# Patient Record
Sex: FEMALE | Race: WHITE | Employment: UNEMPLOYED | URBAN - METROPOLITAN AREA
[De-identification: names, ages, dates, MRNs, and addresses within clinical notes are randomized per-mention and may not be internally consistent; named-entity substitution may affect disease eponyms.]

---

## 2017-02-27 PROBLEM — M67.431 GANGLION CYST OF VOLAR ASPECT OF RIGHT WRIST: Status: ACTIVE | Noted: 2017-02-27

## 2017-05-02 PROBLEM — R19.7 DIARRHEA, UNSPECIFIED TYPE: Status: ACTIVE | Noted: 2017-05-02

## 2017-05-02 PROBLEM — K91.5 POST-CHOLECYSTECTOMY SYNDROME: Status: ACTIVE | Noted: 2017-05-02

## 2017-06-27 ENCOUNTER — TELEPHONE (OUTPATIENT)
Dept: FAMILY MEDICINE CLINIC | Facility: CLINIC | Age: 32
End: 2017-06-27

## 2017-06-27 NOTE — TELEPHONE ENCOUNTER
Pt states she feels a mass about the size of a fist in her abdomen, right above belly button. Has had three hernia's in same area in the past.  Pt sent to triage.

## 2017-07-07 ENCOUNTER — OFFICE VISIT (OUTPATIENT)
Dept: FAMILY MEDICINE CLINIC | Facility: CLINIC | Age: 32
End: 2017-07-07

## 2017-07-07 VITALS
RESPIRATION RATE: 16 BRPM | HEART RATE: 88 BPM | DIASTOLIC BLOOD PRESSURE: 82 MMHG | SYSTOLIC BLOOD PRESSURE: 120 MMHG | TEMPERATURE: 99 F | BODY MASS INDEX: 37 KG/M2 | WEIGHT: 198 LBS

## 2017-07-07 DIAGNOSIS — K43.9 VENTRAL HERNIA WITHOUT OBSTRUCTION OR GANGRENE: Primary | ICD-10-CM

## 2017-07-07 PROCEDURE — 99213 OFFICE O/P EST LOW 20 MIN: CPT | Performed by: FAMILY MEDICINE

## 2017-07-07 NOTE — PROGRESS NOTES
Claudine Diaz is a 27year old female. HPI:   Patient has a history of previous ventral abdominal hernia. She has noted a swelling in her mid abdomen similar to past hernias. It is nonpainful.   Her bowel movements are normal.  Her periods are yung well developed, well nourished,in no apparent distress  SKIN: no rashes,no suspicious lesions  GI: There is a mid upper abdominal mass measuring greater than 10 cm consistent with a ventral abdominal hernia  ASSESSMENT AND PLAN:   Ventral hernia without ob

## 2017-07-10 ENCOUNTER — TELEPHONE (OUTPATIENT)
Dept: SURGERY | Facility: CLINIC | Age: 32
End: 2017-07-10

## 2017-07-10 ENCOUNTER — TELEPHONE (OUTPATIENT)
Dept: FAMILY MEDICINE CLINIC | Facility: CLINIC | Age: 32
End: 2017-07-10

## 2017-07-10 DIAGNOSIS — K43.9 VENTRAL HERNIA WITHOUT OBSTRUCTION OR GANGRENE: Primary | ICD-10-CM

## 2017-07-10 NOTE — TELEPHONE ENCOUNTER
Call to pt's cell reaches identified voice mail. Per hipaa consent left vmm advising dr Shari Cheadle placed requested CT abdomen order. Can call [facility] at [phone] to schedule.    Advised if any further questions to call back to triage n

## 2017-07-10 NOTE — TELEPHONE ENCOUNTER
Advised patient that she would have to see Dr. Ginger Ellis in the office before any imaging would be ordered.      Your appointments     Date & Time Appointment Department [facility])    Jul 24, 2017  4:00 PM CDT Exam - Established Patient with Yvette Wiseman,

## 2017-07-10 NOTE — TELEPHONE ENCOUNTER
Pt stated she saw Dr. Thelma Carrillo last Friday (7/7) and he diagnosed pt with a hernia. Pt was referred back to her previous surgeon, Dr. Sarai Raman with whom she has an appt 7/24.  Pt is asking if Dr. Thelma Carrillo could put in an order for a CT so this can

## 2017-07-13 ENCOUNTER — HOSPITAL ENCOUNTER (OUTPATIENT)
Dept: CT IMAGING | Facility: HOSPITAL | Age: 32
Discharge: HOME | End: 2017-07-13
Attending: FAMILY MEDICINE
Payer: COMMERCIAL

## 2017-07-13 DIAGNOSIS — K43.9 VENTRAL HERNIA WITHOUT OBSTRUCTION OR GANGRENE: Primary | ICD-10-CM

## 2017-07-13 DIAGNOSIS — K43.9 VENTRAL HERNIA WITHOUT OBSTRUCTION OR GANGRENE: ICD-10-CM

## 2017-07-13 PROCEDURE — 74176 CT ABD & PELVIS W/O CONTRAST: CPT | Performed by: FAMILY MEDICINE

## 2017-07-13 NOTE — PROGRESS NOTES
Janie Moore from the radiologist office regarding the patient CT order of the abdomen.   Per Janie Moore she stated that she was discussing with the patient and per the patient  She stated that her pain is not only in her abdomen but it extends downward towards her pel

## 2017-07-14 ENCOUNTER — OFFICE VISIT (OUTPATIENT)
Dept: SURGERY | Facility: CLINIC | Age: 32
End: 2017-07-14

## 2017-07-14 VITALS
BODY MASS INDEX: 36.25 KG/M2 | WEIGHT: 192 LBS | DIASTOLIC BLOOD PRESSURE: 84 MMHG | HEART RATE: 89 BPM | TEMPERATURE: 99 F | SYSTOLIC BLOOD PRESSURE: 119 MMHG | HEIGHT: 61 IN

## 2017-07-14 DIAGNOSIS — K43.2 RECURRENT VENTRAL HERNIA: Primary | ICD-10-CM

## 2017-07-14 DIAGNOSIS — Z98.890 S/P REPAIR OF VENTRAL HERNIA: ICD-10-CM

## 2017-07-14 DIAGNOSIS — Z87.19 S/P REPAIR OF VENTRAL HERNIA: ICD-10-CM

## 2017-07-14 PROCEDURE — 99243 OFF/OP CNSLTJ NEW/EST LOW 30: CPT | Performed by: SURGERY

## 2017-07-14 NOTE — H&P
New Patient  Stephani Mcmanus  6/27/1985 7/14/2017    This patient was referred by Jaylene Barreto MD for evaluation and consultation for ventral hernia. Chief Complaint: ventral hernia    History of Present Illness:  The patient is a 26-year-old HERNIA REPAIR N/A      Comment: Procedure:  HERNIA VENTRAL REPAIR;  Surgeon:                Pernell Jose MD;  Location: [address] [place] [address] MAIN OR      Social History  Social History   Marital status:   Spouse name: N/A    Years of education: N/A  Number of ch Grandmother    • Bipolar Disorder Brother    • Diabetes Father      type 2   • Diabetes Maternal Grandfather      type 2       Objective/Physical Exam:  General: Alert, orientated x3. Cooperative. No apparent distress.   Vital Signs:  Blood pressure 119/8 repair of a ventral hernia with mesh performed by Dr. Yuri Lucia in March 2016.   Obdulio Godoy has an appointment with Dr. Diallo Born later this month however she is here today for evaluation as she is concerned regarding potential incarceration of her most recent he

## 2017-07-14 NOTE — H&P
New Patient Visit Note       Active Problems      No diagnosis found. Chief Complaint   Patient presents with:  Hernia: Hx of ventral hernia repair 2015. Patient had CT abd/pelvis done at [facility] on 7/13/17. c/o abdominal pain 3/10.  Having bowel movement education:                 Number of children:               Social History Main Topics    Smoking status: Never Smoker                                                                Smokeless tobacco: Never Used                        Alcohol use:  No Negative for color change and rash. Neurological: Negative for tremors, syncope and weakness. Hematological: Negative for adenopathy. Does not bruise/bleed easily. Psychiatric/Behavioral: Negative for behavioral problems and sleep disturbance.

## 2017-07-17 ENCOUNTER — OFFICE VISIT (OUTPATIENT)
Dept: SURGERY | Facility: CLINIC | Age: 32
End: 2017-07-17

## 2017-07-17 VITALS
HEART RATE: 91 BPM | HEIGHT: 61 IN | DIASTOLIC BLOOD PRESSURE: 90 MMHG | BODY MASS INDEX: 36.25 KG/M2 | SYSTOLIC BLOOD PRESSURE: 136 MMHG | WEIGHT: 192 LBS | TEMPERATURE: 98 F

## 2017-07-17 DIAGNOSIS — K43.9 VENTRAL HERNIA WITHOUT OBSTRUCTION OR GANGRENE: Primary | ICD-10-CM

## 2017-07-17 PROCEDURE — 99244 OFF/OP CNSLTJ NEW/EST MOD 40: CPT | Performed by: SURGERY

## 2017-07-17 NOTE — PROGRESS NOTES
Follow Up Visit Note       Active Problems      1. Ventral hernia without obstruction or gangrene          Chief Complaint   Patient presents with:  Hernia: Est pt in for reccurent ventral hernia. C/O discomfort/soreness. Last repair done 3/7/2016. and vomiting)    • Post-cholecystectomy syndrome      Past Surgical History:  No date:   \: CHOLECYSTECTOMY  10/15/2015: COLONOSCOPY N/A      Comment: Procedure: COLONOSCOPY;  Surgeon: Antonia Duque MD;  Location: [address] [place] [address] 1,000 Units by mouth daily. Disp:  Rfl:    NON FORMULARY Troy-inositolFor PCOS Disp:  Rfl:    MetFORMIN HCl  MG Oral Tablet 24 Hr Take 750 mg by mouth 2 (two) times daily with meals.  Disp:  Rfl:    Fluocinonide 0.05 % External Solution Apply to scalp No murmur heard. Pulmonary/Chest: No accessory muscle usage. No respiratory distress. She has no decreased breath sounds. She has no wheezes. She has no rhonchi. She has no rales. Abdominal: Soft. Normal appearance.  She exhibits no shifting dullness, waiting to repair the hernia until after she has a third baby. She does then run the risk of a recurrent incarceration. The patient does not wish to wait for repair. · We discussed proceeding with repair at this time.   I would then ask that she wait 6 m

## 2017-07-28 ENCOUNTER — ANESTHESIA EVENT (OUTPATIENT)
Dept: SURGERY | Facility: HOSPITAL | Age: 32
End: 2017-07-28

## 2017-07-28 ENCOUNTER — SURGERY (OUTPATIENT)
Age: 32
End: 2017-07-28

## 2017-07-28 ENCOUNTER — HOSPITAL ENCOUNTER (OUTPATIENT)
Facility: HOSPITAL | Age: 32
Setting detail: HOSPITAL OUTPATIENT SURGERY
Discharge: HOME | End: 2017-07-28
Attending: SURGERY | Admitting: SURGERY
Payer: COMMERCIAL

## 2017-07-28 ENCOUNTER — ANESTHESIA (OUTPATIENT)
Dept: SURGERY | Facility: HOSPITAL | Age: 32
End: 2017-07-28

## 2017-07-28 VITALS
RESPIRATION RATE: 16 BRPM | OXYGEN SATURATION: 100 % | SYSTOLIC BLOOD PRESSURE: 128 MMHG | WEIGHT: 192 LBS | HEIGHT: 61 IN | HEART RATE: 77 BPM | DIASTOLIC BLOOD PRESSURE: 87 MMHG | BODY MASS INDEX: 36.25 KG/M2 | TEMPERATURE: 98 F

## 2017-07-28 DIAGNOSIS — K43.9 VENTRAL HERNIA WITHOUT OBSTRUCTION OR GANGRENE: ICD-10-CM

## 2017-07-28 LAB
POCT LOT NUMBER: NORMAL
POCT URINE PREGNANCY: NEGATIVE

## 2017-07-28 PROCEDURE — 0WUF0JZ SUPPLEMENT ABDOMINAL WALL WITH SYNTHETIC SUBSTITUTE, OPEN APPROACH: ICD-10-PCS | Performed by: SURGERY

## 2017-07-28 PROCEDURE — 81025 URINE PREGNANCY TEST: CPT | Performed by: SURGERY

## 2017-07-28 DEVICE — VENTRALEX ST HERNIA PATCH
Type: IMPLANTABLE DEVICE | Site: ABDOMEN | Status: FUNCTIONAL
Brand: VENTRALEX ST HERNIA PATCH

## 2017-07-28 RX ORDER — SODIUM CHLORIDE, SODIUM LACTATE, POTASSIUM CHLORIDE, CALCIUM CHLORIDE 600; 310; 30; 20 MG/100ML; MG/100ML; MG/100ML; MG/100ML
INJECTION, SOLUTION INTRAVENOUS CONTINUOUS
Status: DISCONTINUED | OUTPATIENT
Start: 2017-07-28 | End: 2017-07-28

## 2017-07-28 RX ORDER — NALOXONE HYDROCHLORIDE 0.4 MG/ML
80 INJECTION, SOLUTION INTRAMUSCULAR; INTRAVENOUS; SUBCUTANEOUS AS NEEDED
Status: DISCONTINUED | OUTPATIENT
Start: 2017-07-28 | End: 2017-07-28

## 2017-07-28 RX ORDER — HYDROMORPHONE HYDROCHLORIDE 1 MG/ML
0.4 INJECTION, SOLUTION INTRAMUSCULAR; INTRAVENOUS; SUBCUTANEOUS EVERY 5 MIN PRN
Status: DISCONTINUED | OUTPATIENT
Start: 2017-07-28 | End: 2017-07-28

## 2017-07-28 RX ORDER — BACITRACIN 50000 [USP'U]/1
INJECTION, POWDER, LYOPHILIZED, FOR SOLUTION INTRAMUSCULAR AS NEEDED
Status: DISCONTINUED | OUTPATIENT
Start: 2017-07-28 | End: 2017-07-28 | Stop reason: HOSPADM

## 2017-07-28 RX ORDER — HEPARIN SODIUM 5000 [USP'U]/ML
5000 INJECTION, SOLUTION INTRAVENOUS; SUBCUTANEOUS ONCE
Status: COMPLETED | OUTPATIENT
Start: 2017-07-28 | End: 2017-07-28

## 2017-07-28 RX ORDER — IBUPROFEN 600 MG/1
600 TABLET ORAL ONCE AS NEEDED
Status: DISCONTINUED | OUTPATIENT
Start: 2017-07-28 | End: 2017-07-28

## 2017-07-28 RX ORDER — SCOLOPAMINE TRANSDERMAL SYSTEM 1 MG/1
1 PATCH, EXTENDED RELEASE TRANSDERMAL ONCE
Status: DISCONTINUED | OUTPATIENT
Start: 2017-07-28 | End: 2017-07-28

## 2017-07-28 RX ORDER — BUPIVACAINE HYDROCHLORIDE AND EPINEPHRINE 2.5; 5 MG/ML; UG/ML
INJECTION, SOLUTION EPIDURAL; INFILTRATION; INTRACAUDAL; PERINEURAL AS NEEDED
Status: DISCONTINUED | OUTPATIENT
Start: 2017-07-28 | End: 2017-07-28 | Stop reason: HOSPADM

## 2017-07-28 RX ORDER — HYDROCODONE BITARTRATE AND ACETAMINOPHEN 5; 325 MG/1; MG/1
TABLET ORAL
Qty: 30 TABLET | Refills: 0 | Status: SHIPPED | OUTPATIENT
Start: 2017-07-28 | End: 2017-09-19 | Stop reason: ALTCHOICE

## 2017-07-28 RX ORDER — SCOLOPAMINE TRANSDERMAL SYSTEM 1 MG/1
PATCH, EXTENDED RELEASE TRANSDERMAL
Status: DISCONTINUED
Start: 2017-07-28 | End: 2017-07-28

## 2017-07-28 RX ORDER — HYDROMORPHONE HYDROCHLORIDE 1 MG/ML
INJECTION, SOLUTION INTRAMUSCULAR; INTRAVENOUS; SUBCUTANEOUS
Status: COMPLETED
Start: 2017-07-28 | End: 2017-07-28

## 2017-07-28 RX ORDER — CEFAZOLIN SODIUM 1 G/3ML
INJECTION, POWDER, FOR SOLUTION INTRAMUSCULAR; INTRAVENOUS
Status: DISCONTINUED | OUTPATIENT
Start: 2017-07-28 | End: 2017-07-28 | Stop reason: HOSPADM

## 2017-07-28 NOTE — ANESTHESIA PREPROCEDURE EVALUATION
PRE-OP EVALUATION    Patient Name: Vicki Santiago    Pre-op Diagnosis: Ventral hernia without obstruction or gangrene [K43.9]    Procedure(s):  VENTRAL HERNIA REPAIR WITH MESH,COMPLEX    Surgeon(s) and Role:     Vallarie Koyanagi, MD - Primary    P Neuro/Psych                                    Past Surgical History:  No date:   \: CHOLECYSTECTOMY  10/15/2015: COLONOSCOPY N/A      Comment: Procedure: COLONOSCOPY;  Surgeon: Brando Ellison MD;  Location:

## 2017-07-28 NOTE — OPERATIVE REPORT
PREOPERATIVE DIAGNOSIS: Incarcerated recurrent ventral hernia.    POSTOPERATIVE DIAGNOSIS: Incarcerated recurrent ventral hernia.    PROCEDURE PERFORMED: Repair of incarcerated recurrent ventral hernia with mesh  MD ANGEL Calhoun Prolene.  Once the mesh was checked and no gaps were noted, the sutures were tied down.   The fascia was reapproximated with a figure of eight 0 Ethibond suture.  The subcutaneous tissues were irrigated with normal saline.  The incision was then closed in 2

## 2017-07-28 NOTE — H&P
History & Physical Examination    Patient Name: Bhavin Angel  MRN: [id number]  CSN: [id number]  YOB: 1985    Diagnosis: Recurrent ventral hernia    Present Illness:  The patient is a 57-year-old female seen at the request of her primary c daily with meals.  Takes for PCOS  Disp:  Rfl:  7/27/2017 at pm   Fluocinonide 0.05 % External Solution Apply to scalp qd for two weeks Disp: 60 mL Rfl: 2 Past Month at Unknown time   Ketoconazole 2 % External Shampoo Wash scalp qod Disp: 120 mL Rfl: 2 Past david's [OTHER] Brother    • ADHD Mother    • OCD Mother    • Bipolar Disorder Maternal Grandmother    • Stroke Maternal Grandmother    • Bipolar Disorder Brother    • Diabetes Father      type 2   • Diabetes Maternal Grandfather      type 2        S

## 2017-07-28 NOTE — ANESTHESIA POSTPROCEDURE EVALUATION
[address] Patient Status:  Hospital Outpatient Surgery   Age/Gender 27year old female MRN [id number]   Location [address] Attending Yvette Wiseman MD   Hosp Day # 0 PCP Erica Henry MD

## 2017-08-10 ENCOUNTER — OFFICE VISIT (OUTPATIENT)
Dept: SURGERY | Facility: CLINIC | Age: 32
End: 2017-08-10

## 2017-08-10 VITALS
TEMPERATURE: 99 F | WEIGHT: 189 LBS | DIASTOLIC BLOOD PRESSURE: 84 MMHG | SYSTOLIC BLOOD PRESSURE: 116 MMHG | HEART RATE: 77 BPM | HEIGHT: 61 IN | BODY MASS INDEX: 35.68 KG/M2 | RESPIRATION RATE: 18 BRPM

## 2017-08-10 DIAGNOSIS — K43.2 RECURRENT VENTRAL HERNIA: Primary | ICD-10-CM

## 2017-08-10 PROCEDURE — 99024 POSTOP FOLLOW-UP VISIT: CPT | Performed by: PHYSICIAN ASSISTANT

## 2017-08-10 RX ORDER — TEMAZEPAM 15 MG/1
15 CAPSULE ORAL NIGHTLY PRN
Qty: 5 CAPSULE | Refills: 0 | Status: SHIPPED | OUTPATIENT
Start: 2017-08-10 | End: 2018-02-06

## 2017-08-10 NOTE — PROGRESS NOTES
Post Operative Visit Note       Active Problems  1. Recurrent ventral hernia         Chief Complaint   Patient presents with:  Post-Op: 1st PO- Repair of incarcerated recurrent ventral hernia with mesh done 7/28/17. c/o soreness.  states has elevated temp s date: TONSILLECTOMY  11/12/2015: VENTRAL HERNIA REPAIR N/A      Comment: Procedure: HERNIA VENTRAL REPAIR;  Surgeon:                Silverio Simons MD;  Location: [address] [place] [address]    The family history and social history have been reviewed by me today.     Enedelia Gilbert Fluocinonide 0.05 % External Solution Apply to scalp qd for two weeks Disp: 60 mL Rfl: 2   Ketoconazole 2 % External Shampoo Wash scalp qod Disp: 120 mL Rfl: 2   WAL-FRANCES 75 75 MG Oral Tab  Disp:  Rfl: 3   HYDROcodone-acetaminophen (NORCO) 5-325 MG Oral T distension and no mass. There is no tenderness. There is no rebound and no guarding. Abdominal exam: The abdomen is soft, nontender, nondistended, with good bowel sounds.     Incision: The incision is clean, dry, intact, without erythema or cellulitis

## 2017-09-15 ENCOUNTER — TELEPHONE (OUTPATIENT)
Dept: FAMILY MEDICINE CLINIC | Facility: CLINIC | Age: 32
End: 2017-09-15

## 2017-09-15 NOTE — TELEPHONE ENCOUNTER
Pt states she has been sick for about 10 days with what she termed a sinus infection. Denies a fever. Pt requesting to make an appt for next week in case she does not improve over the weekend.  Pt stated she has chest tightness and thinks it is just inflamm

## 2017-09-19 ENCOUNTER — OFFICE VISIT (OUTPATIENT)
Dept: FAMILY MEDICINE CLINIC | Facility: CLINIC | Age: 32
End: 2017-09-19

## 2017-09-19 VITALS
SYSTOLIC BLOOD PRESSURE: 106 MMHG | DIASTOLIC BLOOD PRESSURE: 74 MMHG | WEIGHT: 190 LBS | HEART RATE: 76 BPM | TEMPERATURE: 98 F | RESPIRATION RATE: 12 BRPM | HEIGHT: 61 IN | BODY MASS INDEX: 35.87 KG/M2

## 2017-09-19 DIAGNOSIS — R05.9 COUGH: Primary | ICD-10-CM

## 2017-09-19 DIAGNOSIS — J06.9 UPPER RESPIRATORY TRACT INFECTION, UNSPECIFIED TYPE: ICD-10-CM

## 2017-09-19 PROCEDURE — 99213 OFFICE O/P EST LOW 20 MIN: CPT | Performed by: FAMILY MEDICINE

## 2017-09-19 RX ORDER — FLUCONAZOLE 150 MG/1
150 TABLET ORAL ONCE
Qty: 2 TABLET | Refills: 0 | Status: SHIPPED | COMMUNITY
Start: 2017-09-19 | End: 2017-09-19

## 2017-09-19 RX ORDER — ESCITALOPRAM OXALATE 5 MG/1
2.5 TABLET ORAL DAILY
Refills: 0 | COMMUNITY
Start: 2017-08-29

## 2017-09-19 RX ORDER — DOXYCYCLINE 100 MG/1
TABLET ORAL
Qty: 20 TABLET | Refills: 0 | Status: SHIPPED | COMMUNITY
Start: 2017-09-19 | End: 2018-02-06

## 2017-09-19 NOTE — PROGRESS NOTES
CHIEF COMPLAINT:   Sick  HPI:   Jessica Lechuga is a 27year old female who presents for upper respiratory symptoms for  2  weeks. Patient reports congestion. Cough. PND. No fever. Sinus pressure. Hx of asthma. Sinus symptoms improving.   Now seems t N/A      Comment: Procedure: COLONOSCOPY;  Surgeon: Chidi Domingo MD;  Location: [address] [place] [address] ENDOSCOPY  No date: HERNIA SURGERY  WISDOM TEETH: OTHER  2005: OTHER SURGICAL HISTORY      Comment: gallbladder sx  No date: TONSILLECTOMY  11/12/2015: VE in this encounter.       Meds & Refills for this Visit:  Signed Prescriptions Disp Refills    Doxycycline Monohydrate 100 MG Oral Tab 20 tablet 0      Si po BID      fluconazole (DIFLUCAN) 150 MG Oral Tab 2 tablet 0      Sig: Take 1 tablet (150 mg total

## 2017-11-28 ENCOUNTER — APPOINTMENT (OUTPATIENT)
Dept: GENERAL RADIOLOGY | Facility: HOSPITAL | Age: 32
End: 2017-11-28
Attending: EMERGENCY MEDICINE
Payer: COMMERCIAL

## 2017-11-28 ENCOUNTER — HOSPITAL ENCOUNTER (EMERGENCY)
Facility: HOSPITAL | Age: 32
Discharge: HOME | End: 2017-11-28
Attending: EMERGENCY MEDICINE
Payer: COMMERCIAL

## 2017-11-28 VITALS
WEIGHT: 189 LBS | HEIGHT: 61 IN | RESPIRATION RATE: 14 BRPM | TEMPERATURE: 98 F | DIASTOLIC BLOOD PRESSURE: 86 MMHG | SYSTOLIC BLOOD PRESSURE: 138 MMHG | OXYGEN SATURATION: 100 % | HEART RATE: 74 BPM | BODY MASS INDEX: 35.68 KG/M2

## 2017-11-28 DIAGNOSIS — R05.8 POST-VIRAL COUGH SYNDROME: Primary | ICD-10-CM

## 2017-11-28 DIAGNOSIS — E87.6 HYPOKALEMIA: ICD-10-CM

## 2017-11-28 DIAGNOSIS — R19.7 DIARRHEA OF PRESUMED INFECTIOUS ORIGIN: ICD-10-CM

## 2017-11-28 DIAGNOSIS — R42 DIZZINESS: ICD-10-CM

## 2017-11-28 PROCEDURE — 80053 COMPREHEN METABOLIC PANEL: CPT | Performed by: EMERGENCY MEDICINE

## 2017-11-28 PROCEDURE — 83690 ASSAY OF LIPASE: CPT | Performed by: EMERGENCY MEDICINE

## 2017-11-28 PROCEDURE — 71020 XR CHEST PA + LAT CHEST (CPT=71020): CPT | Performed by: EMERGENCY MEDICINE

## 2017-11-28 PROCEDURE — 99284 EMERGENCY DEPT VISIT MOD MDM: CPT

## 2017-11-28 PROCEDURE — 85025 COMPLETE CBC W/AUTO DIFF WBC: CPT | Performed by: EMERGENCY MEDICINE

## 2017-11-28 PROCEDURE — 96361 HYDRATE IV INFUSION ADD-ON: CPT

## 2017-11-28 PROCEDURE — 96360 HYDRATION IV INFUSION INIT: CPT

## 2017-11-28 RX ORDER — POTASSIUM CHLORIDE 20 MEQ/1
40 TABLET, EXTENDED RELEASE ORAL ONCE
Status: COMPLETED | OUTPATIENT
Start: 2017-11-28 | End: 2017-11-28

## 2017-11-28 NOTE — ED INITIAL ASSESSMENT (HPI)
Pt here stating she is dehydrated and needs fluids. Pt stating she has a high heart rate, low blood pressure and shallow breathing. Recent vomiting with dry heaves and passing out on floor 4d ago. Temp 100.4 3d ago with chills, body aches .  Eating and dri

## 2017-11-28 NOTE — ED PROVIDER NOTES
Patient Seen in: [facility] Emergency Department    History   Patient presents with:  Dizziness (neurologic)    Stated Complaint: dizzy    HPI    Patient feeling dizzy and easily fatigued as well as easily short of breath since a gastrointestinal il Location:  MAIN OR        Smoking status: Never Smoker                                                              Smokeless tobacco: Never Used                      Alcohol use:  No                Review of Systems    Positive for stated complaint: dizz No cranial nerve deficit. Coordination normal.   Skin: Skin is warm and dry. No rash noted. She is not diaphoretic. No erythema. No pallor. Psychiatric: She has a normal mood and affect.  Her behavior is normal. Judgment normal.   Nursing note and vitals cardiac silhouette. MEDIASTINUM:  Normal.    PLEURA:  Normal.  No pleural effusions. BONES:  Normal for age.         =====    CONCLUSION:  No consolidation.                    Dictated by: Keyona Jimenez MD on 11/28/2017 at 9:53         Approved b origin  Hypokalemia  Dizziness    Disposition:  Discharge  11/28/2017 10:32 am    Follow-up:  Garret Calero MD  [address] [place] [address] [address]  [address] [place] [address] [phone]      As needed, If symptoms worsen        Medications Prescribed:  Current Disch

## 2017-11-30 ENCOUNTER — TELEPHONE (OUTPATIENT)
Dept: FAMILY MEDICINE CLINIC | Facility: CLINIC | Age: 32
End: 2017-11-30

## 2017-11-30 NOTE — TELEPHONE ENCOUNTER
Pt hoping to be sent Rx vs seen   Advised of providers response  Offered pt to be seen at [facility] for evaluation for this as well, no availability today  Pt verbalized understanding

## 2017-11-30 NOTE — TELEPHONE ENCOUNTER
Pt relayed that she (as well as her 2 kids and ) has lice. They have done a total of 4 treatments for the past 6 weeks of OTC RID lice treatment with no relief.  The pediatrician recommended she contact her PCP for a script for medicated lice shampoo

## 2017-11-30 NOTE — TELEPHONE ENCOUNTER
Would you like to see the pt? Or ok sending Rx? LOV 9/2017 for acute visit     Pls advise. Thank you.

## 2018-06-27 ENCOUNTER — OFFICE VISIT (OUTPATIENT)
Dept: FAMILY MEDICINE CLINIC | Facility: CLINIC | Age: 33
End: 2018-06-27

## 2018-06-27 VITALS
HEART RATE: 80 BPM | DIASTOLIC BLOOD PRESSURE: 84 MMHG | WEIGHT: 199 LBS | TEMPERATURE: 98 F | BODY MASS INDEX: 37.57 KG/M2 | SYSTOLIC BLOOD PRESSURE: 112 MMHG | RESPIRATION RATE: 16 BRPM | HEIGHT: 61 IN

## 2018-06-27 DIAGNOSIS — M62.830 MUSCLE SPASM OF BACK: Primary | ICD-10-CM

## 2018-06-27 PROCEDURE — 99213 OFFICE O/P EST LOW 20 MIN: CPT | Performed by: NURSE PRACTITIONER

## 2018-06-27 RX ORDER — DICLOFENAC SODIUM 75 MG/1
75 TABLET, DELAYED RELEASE ORAL 2 TIMES DAILY
Qty: 20 TABLET | Refills: 0 | Status: SHIPPED | OUTPATIENT
Start: 2018-06-27 | End: 2018-07-07

## 2018-06-27 RX ORDER — CYCLOBENZAPRINE HCL 10 MG
10 TABLET ORAL 3 TIMES DAILY PRN
Qty: 15 TABLET | Refills: 0 | Status: SHIPPED | OUTPATIENT
Start: 2018-06-27 | End: 2019-03-11

## 2018-06-27 NOTE — PROGRESS NOTES
Amy Gutierrez is a 26year old female. HPI:   Patient presents today reporting right upper back discomfort for the past 3 days. She reports that she was playing basketball on Saturday at the park with her children.  She reports that she has been alter Neurocardiogenic syncope     had as teen, outgrew   • Polycystic disease, ovaries    • PONV (postoperative nausea and vomiting)    • Post-cholecystectomy syndrome       Social History:  Smoking status: Never Smoker as ordered. Patient instructed to take medication with food. Cyclobenzaprine as needed-discussed with patient this medication can cause drowsiness- no driving or drinking alcohol while taking medication. Discussed with patient taking this at HS.   Can cont

## 2018-06-29 ENCOUNTER — TELEPHONE (OUTPATIENT)
Dept: FAMILY MEDICINE CLINIC | Facility: CLINIC | Age: 33
End: 2018-06-29

## 2018-06-29 NOTE — TELEPHONE ENCOUNTER
Pt calling with update. Everything prescribed is working. States she is showing improvement and doing better. Thank you.

## 2018-08-03 ENCOUNTER — OFFICE VISIT (OUTPATIENT)
Dept: FAMILY MEDICINE CLINIC | Facility: CLINIC | Age: 33
End: 2018-08-03
Payer: COMMERCIAL

## 2018-08-03 VITALS
DIASTOLIC BLOOD PRESSURE: 80 MMHG | BODY MASS INDEX: 37 KG/M2 | RESPIRATION RATE: 16 BRPM | WEIGHT: 196 LBS | SYSTOLIC BLOOD PRESSURE: 124 MMHG | TEMPERATURE: 99 F | HEART RATE: 84 BPM

## 2018-08-03 DIAGNOSIS — K91.5 POST-CHOLECYSTECTOMY SYNDROME: ICD-10-CM

## 2018-08-03 DIAGNOSIS — M25.50 DIFFUSE ARTHRALGIA: Primary | ICD-10-CM

## 2018-08-03 PROCEDURE — 99213 OFFICE O/P EST LOW 20 MIN: CPT | Performed by: FAMILY MEDICINE

## 2018-08-06 LAB
ABSOLUTE BASOPHILS: 38 CELLS/UL (ref 0–200)
ABSOLUTE EOSINOPHILS: 120 CELLS/UL (ref 15–500)
ABSOLUTE LYMPHOCYTES: 3143 CELLS/UL (ref 850–3900)
ABSOLUTE MONOCYTES: 458 CELLS/UL (ref 200–950)
ABSOLUTE NEUTROPHILS: 3743 CELLS/UL (ref 1500–7800)
ALBUMIN/GLOBULIN RATIO: 1.6 (CALC) (ref 1–2.5)
ALBUMIN: 4.2 G/DL (ref 3.6–5.1)
ALKALINE PHOSPHATASE: 66 U/L (ref 33–115)
ALT: 23 U/L (ref 6–29)
ANA SCREEN, IFA: NEGATIVE
AST: 22 U/L (ref 10–30)
BASOPHILS: 0.5 %
BILIRUBIN, TOTAL: 0.5 MG/DL (ref 0.2–1.2)
BUN: 11 MG/DL (ref 7–25)
C-REACTIVE PROTEIN: 6.3 MG/L
CALCIUM: 9.2 MG/DL (ref 8.6–10.2)
CARBON DIOXIDE: 21 MMOL/L (ref 20–31)
CHLORIDE: 105 MMOL/L (ref 98–110)
CHOL/HDLC RATIO: 3.1 (CALC)
CHOLESTEROL, TOTAL: 121 MG/DL
CREATININE: 0.89 MG/DL (ref 0.5–1.1)
CYCLIC CITRULLINATED$PEPTIDE (CCP) AB (IGG): <16 UNITS
EGFR IF AFRICN AM: 97 ML/MIN/1.73M2
EGFR IF NONAFRICN AM: 84 ML/MIN/1.73M2
EOSINOPHILS: 1.6 %
GLOBULIN: 2.7 G/DL (CALC) (ref 1.9–3.7)
GLUCOSE: 81 MG/DL (ref 65–99)
HDL CHOLESTEROL: 39 MG/DL
HEMATOCRIT: 42.7 % (ref 35–45)
HEMOGLOBIN: 13.6 G/DL (ref 11.7–15.5)
LDL-CHOLESTEROL: 60 MG/DL (CALC)
LYMPHOCYTES: 41.9 %
MCH: 26.3 PG (ref 27–33)
MCHC: 31.9 G/DL (ref 32–36)
MCV: 82.6 FL (ref 80–100)
MONOCYTES: 6.1 %
MPV: 11.4 FL (ref 7.5–12.5)
NEUTROPHILS: 49.9 %
NON-HDL CHOLESTEROL: 82 MG/DL (CALC)
PLATELET COUNT: 266 THOUSAND/UL (ref 140–400)
POTASSIUM: 4.3 MMOL/L (ref 3.5–5.3)
PROTEIN, TOTAL: 6.9 G/DL (ref 6.1–8.1)
RDW: 14 % (ref 11–15)
RED BLOOD CELL COUNT: 5.17 MILLION/UL (ref 3.8–5.1)
RHEUMATOID FACTOR: <14 IU/ML
SED RATE BY MODIFIED$WESTERGREN: 2 MM/H
SODIUM: 137 MMOL/L (ref 135–146)
TRIGLYCERIDES: 134 MG/DL
URIC ACID: 5.9 MG/DL (ref 2.5–7)
WHITE BLOOD CELL COUNT: 7.5 THOUSAND/UL (ref 3.8–10.8)

## 2018-08-13 NOTE — PROGRESS NOTES
Sharon Brian is a 26year old female. HPI:   Patient is a 64-year-old female who complains of migratory joint pains over 2-3 month timeframe. She states that the joint pains seem to concentrate mainly on the hands, feet and hips.   There is no assoc Polycystic disease, ovaries    • PONV (postoperative nausea and vomiting)    • Post-cholecystectomy syndrome       Social History:  Smoking status: Never Smoker                                                              Smokeless tobacco: Never Used

## 2018-11-19 ENCOUNTER — OFFICE VISIT (OUTPATIENT)
Dept: FAMILY MEDICINE CLINIC | Facility: CLINIC | Age: 33
End: 2018-11-19
Payer: COMMERCIAL

## 2018-11-19 VITALS
TEMPERATURE: 98 F | WEIGHT: 194 LBS | DIASTOLIC BLOOD PRESSURE: 74 MMHG | RESPIRATION RATE: 20 BRPM | HEIGHT: 61 IN | BODY MASS INDEX: 36.63 KG/M2 | HEART RATE: 95 BPM | OXYGEN SATURATION: 100 % | SYSTOLIC BLOOD PRESSURE: 110 MMHG

## 2018-11-19 DIAGNOSIS — J01.00 ACUTE NON-RECURRENT MAXILLARY SINUSITIS: Primary | ICD-10-CM

## 2018-11-19 PROCEDURE — 99213 OFFICE O/P EST LOW 20 MIN: CPT | Performed by: NURSE PRACTITIONER

## 2018-11-19 RX ORDER — AMOXICILLIN AND CLAVULANATE POTASSIUM 875; 125 MG/1; MG/1
1 TABLET, FILM COATED ORAL 2 TIMES DAILY
Qty: 20 TABLET | Refills: 0 | Status: SHIPPED | OUTPATIENT
Start: 2018-11-19 | End: 2018-11-29

## 2018-11-19 RX ORDER — FLUCONAZOLE 150 MG/1
TABLET ORAL
Qty: 2 TABLET | Refills: 0 | Status: SHIPPED | OUTPATIENT
Start: 2018-11-19 | End: 2019-02-08 | Stop reason: ALTCHOICE

## 2018-11-19 NOTE — PROGRESS NOTES
Allyson Ahuja is a 26year old female. HPI:   Patient presents today reporting a 2 week history of sinus congestion, post nasal drip, bilateral ear pressure and productive cough. Reports sinus headaches. Denies teeth pain.  She feels her symptoms are External Solution Apply to scalp qd for two weeks Disp: 60 mL Rfl: 2   WAL-FRANCES 75 75 MG Oral Tab  Disp:  Rfl: 3      Past Medical History:   Diagnosis Date   • Anesthesia complication     HARD TO AWAKEN   • Asthma     due bouts of flu   • Neurocardiogenic defined types were placed in this encounter.       Meds & Refills for this Visit:  Requested Prescriptions     Signed Prescriptions Disp Refills   • Amoxicillin-Pot Clavulanate 875-125 MG Oral Tab 20 tablet 0     Sig: Take 1 tablet by mouth 2 (two) times da

## 2018-12-11 DIAGNOSIS — K91.5 POST-CHOLECYSTECTOMY SYNDROME: ICD-10-CM

## 2018-12-11 DIAGNOSIS — R19.7 DIARRHEA, UNSPECIFIED TYPE: ICD-10-CM

## 2018-12-11 RX ORDER — CHOLESTYRAMINE LIGHT 4 G/5.7G
4 POWDER, FOR SUSPENSION ORAL 2 TIMES DAILY
Qty: 180 EACH | Refills: 0 | Status: SHIPPED | OUTPATIENT
Start: 2018-12-11 | End: 2019-01-23

## 2019-01-23 DIAGNOSIS — K91.5 POST-CHOLECYSTECTOMY SYNDROME: ICD-10-CM

## 2019-01-23 DIAGNOSIS — R19.7 DIARRHEA, UNSPECIFIED TYPE: ICD-10-CM

## 2019-01-24 RX ORDER — CHOLESTYRAMINE LIGHT 4 G/5.7G
POWDER, FOR SUSPENSION ORAL
Qty: 180 PACKET | Refills: 0 | Status: SHIPPED | OUTPATIENT
Start: 2019-01-24 | End: 2019-03-11

## 2019-02-08 ENCOUNTER — OFFICE VISIT (OUTPATIENT)
Dept: FAMILY MEDICINE CLINIC | Facility: CLINIC | Age: 34
End: 2019-02-08
Payer: COMMERCIAL

## 2019-02-08 VITALS
BODY MASS INDEX: 36.25 KG/M2 | SYSTOLIC BLOOD PRESSURE: 120 MMHG | WEIGHT: 192 LBS | DIASTOLIC BLOOD PRESSURE: 72 MMHG | HEIGHT: 61 IN | HEART RATE: 80 BPM | RESPIRATION RATE: 16 BRPM | TEMPERATURE: 98 F

## 2019-02-08 DIAGNOSIS — Z34.91 FIRST TRIMESTER PREGNANCY: Primary | ICD-10-CM

## 2019-02-08 DIAGNOSIS — Z01.30 EXAMINATION OF BLOOD PRESSURE: ICD-10-CM

## 2019-02-08 PROCEDURE — 99213 OFFICE O/P EST LOW 20 MIN: CPT | Performed by: FAMILY MEDICINE

## 2019-02-08 NOTE — PROGRESS NOTES
Simone Sy is a 26year old female. HPI:   Patient states that she is 6-7 weeks pregnant. She had appointment at her reproductive endocrinologist where her blood pressure was taken and found to be 140/90.   Patient states that the placement of the Post-cholecystectomy syndrome       Social History:  Social History    Tobacco Use      Smoking status: Never Smoker      Smokeless tobacco: Never Used    Alcohol use: No      Alcohol/week: 0.0 oz    Drug use: No       REVIEW OF SYSTEMS:   GENERAL HEALTH:

## 2019-03-11 ENCOUNTER — OFFICE VISIT (OUTPATIENT)
Dept: FAMILY MEDICINE CLINIC | Facility: CLINIC | Age: 34
End: 2019-03-11
Payer: COMMERCIAL

## 2019-03-11 VITALS
HEART RATE: 80 BPM | SYSTOLIC BLOOD PRESSURE: 122 MMHG | TEMPERATURE: 99 F | DIASTOLIC BLOOD PRESSURE: 80 MMHG | HEIGHT: 60 IN | WEIGHT: 192 LBS | BODY MASS INDEX: 37.69 KG/M2 | RESPIRATION RATE: 14 BRPM

## 2019-03-11 DIAGNOSIS — Z3A.14 14 WEEKS GESTATION OF PREGNANCY: ICD-10-CM

## 2019-03-11 DIAGNOSIS — Z00.00 ROUTINE GENERAL MEDICAL EXAMINATION AT A HEALTH CARE FACILITY: Primary | ICD-10-CM

## 2019-03-11 DIAGNOSIS — K91.5 POST-CHOLECYSTECTOMY SYNDROME: ICD-10-CM

## 2019-03-11 PROBLEM — R19.7 DIARRHEA, UNSPECIFIED TYPE: Status: RESOLVED | Noted: 2017-05-02 | Resolved: 2019-03-11

## 2019-03-11 PROCEDURE — 99395 PREV VISIT EST AGE 18-39: CPT | Performed by: FAMILY MEDICINE

## 2019-03-11 RX ORDER — CHOLESTYRAMINE LIGHT 4 G/5.7G
POWDER, FOR SUSPENSION ORAL
Qty: 180 PACKET | Refills: 1 | Status: SHIPPED | OUTPATIENT
Start: 2019-03-11 | End: 2020-01-22

## 2019-03-11 NOTE — PROGRESS NOTES
CHIEF COMPLAINT   Complete physical  HPI:   Mike Heredia is a 26year old female who presents for a complete physical exam.   Recent labs with gyne through [address] 14 weeks pregnant currently.     Wt Readings from Last 6 Encounters:  03/11/19 : 192 lb times daily with meals.  Takes for PCOS  Disp:  Rfl:    WAL-FRANCES 75 75 MG Oral Tab  Disp:  Rfl: 3        Propoxyphene            NAUSEA AND VOMITING  Biaxin [Clarithromy*    OTHER (SEE COMMENTS)    Comment:GI upset  Erythromycin            RASH    Comment:De REVIEW OF SYSTEMS:   GENERAL: feels well otherwise  SKIN: no complaint of any unusual skin lesions  EYES: no complaint of blurred vision or double vision  HEENT: no complaint of nasal congestion, sinus pain or ST  LUNGS: no complaint of shortness of br neck pain until after pregnancy.

## 2019-03-19 ENCOUNTER — TELEPHONE (OUTPATIENT)
Dept: FAMILY MEDICINE CLINIC | Facility: CLINIC | Age: 34
End: 2019-03-19

## 2019-03-19 NOTE — TELEPHONE ENCOUNTER
Pt came to the office and dropped off lab test results for S Dressler. Lab results were paper clipped and placed in S Dressler's mailbox.

## 2019-05-23 ENCOUNTER — OFFICE VISIT (OUTPATIENT)
Dept: FAMILY MEDICINE CLINIC | Facility: CLINIC | Age: 34
End: 2019-05-23
Payer: COMMERCIAL

## 2019-05-23 VITALS
DIASTOLIC BLOOD PRESSURE: 72 MMHG | TEMPERATURE: 98 F | WEIGHT: 198 LBS | BODY MASS INDEX: 38.87 KG/M2 | HEIGHT: 60 IN | HEART RATE: 84 BPM | SYSTOLIC BLOOD PRESSURE: 118 MMHG | RESPIRATION RATE: 16 BRPM

## 2019-05-23 DIAGNOSIS — H69.82 DYSFUNCTION OF LEFT EUSTACHIAN TUBE: Primary | ICD-10-CM

## 2019-05-23 PROCEDURE — 99213 OFFICE O/P EST LOW 20 MIN: CPT | Performed by: FAMILY MEDICINE

## 2019-05-23 NOTE — PROGRESS NOTES
CHIEF COMPLAINT:   Check left ear popping  HPI:   Had a cold 2 months ago. Also has allergy symptoms - getting allergy shots - no worse than normal.  Left ear feels popping with sneezing or coughing. Intermittent but usually happens every day.  When she is ESOPHAGOGASTRODUODENOSCOPY (EGD) N/A 10/15/2015    Performed by Wilda Orta MD at [facility] N/A 7/28/2017    Performed by Sebastián Desai MD at [facility] N/A 11/12/2015 encounter diagnosis)  Pregnancy   No orders of the defined types were placed in this encounter. Recommended trial of claritin and fluticasone if ok with OB - patient will verify ok with them prior to starting.   If still no better in 2 weeks, then see EN

## 2019-10-17 ENCOUNTER — TELEPHONE (OUTPATIENT)
Dept: FAMILY MEDICINE CLINIC | Facility: CLINIC | Age: 34
End: 2019-10-17

## 2019-10-17 ENCOUNTER — OFFICE VISIT (OUTPATIENT)
Dept: FAMILY MEDICINE CLINIC | Facility: CLINIC | Age: 34
End: 2019-10-17
Payer: COMMERCIAL

## 2019-10-17 VITALS
DIASTOLIC BLOOD PRESSURE: 90 MMHG | TEMPERATURE: 97 F | SYSTOLIC BLOOD PRESSURE: 130 MMHG | BODY MASS INDEX: 39 KG/M2 | HEART RATE: 76 BPM | RESPIRATION RATE: 16 BRPM | HEIGHT: 60 IN

## 2019-10-17 DIAGNOSIS — E66.9 OBESITY (BMI 30-39.9): ICD-10-CM

## 2019-10-17 DIAGNOSIS — E28.2 PCOS (POLYCYSTIC OVARIAN SYNDROME): Primary | ICD-10-CM

## 2019-10-17 PROCEDURE — 99214 OFFICE O/P EST MOD 30 MIN: CPT | Performed by: FAMILY MEDICINE

## 2019-10-17 NOTE — PROGRESS NOTES
Lu Reynoso is a 37year old female. CHIEF COMPLAINT   PCOS  HPI:   3month old - breast feeding. History of PCOS - diagnosed about . Saw reproductive endocrine who was managing but now referred back her. Taking metformin ER 750mg BID. Polycystic disease, ovaries    • PONV (postoperative nausea and vomiting)    • Post-cholecystectomy syndrome       Social History:  Social History    Tobacco Use      Smoking status: Never Smoker      Smokeless tobacco: Never Used    Alcohol use: No      A

## 2019-10-18 ENCOUNTER — LAB REQUISITION (OUTPATIENT)
Dept: LAB | Facility: HOSPITAL | Age: 34
End: 2019-10-18
Payer: COMMERCIAL

## 2019-10-18 DIAGNOSIS — Z00.00 ENCOUNTER FOR GENERAL ADULT MEDICAL EXAMINATION WITHOUT ABNORMAL FINDINGS: ICD-10-CM

## 2019-10-18 PROBLEM — M67.431 GANGLION CYST OF VOLAR ASPECT OF RIGHT WRIST: Status: RESOLVED | Noted: 2017-02-27 | Resolved: 2019-10-18

## 2019-10-18 PROCEDURE — 88305 TISSUE EXAM BY PATHOLOGIST: CPT | Performed by: DENTIST

## 2019-10-21 NOTE — TELEPHONE ENCOUNTER
Call to [facility] reaches voice mail. Left m req call back to me tomorrow after 730 am for a question re one of our pts. Provided our ofc  line #.

## 2019-10-22 NOTE — TELEPHONE ENCOUNTER
Call back from [facility] (dr line # [phone])-[id number] of info noted below from edwina JAMA. Provided pt's name,  and BC/BS ins. Ammon Amaya will send a One Touch meter and strips thru interoffice mail to stephanie dressler's attention.

## 2020-01-22 ENCOUNTER — HOSPITAL ENCOUNTER (OUTPATIENT)
Dept: GENERAL RADIOLOGY | Age: 35
Discharge: HOME | End: 2020-01-22
Attending: NURSE PRACTITIONER
Payer: COMMERCIAL

## 2020-01-22 ENCOUNTER — OFFICE VISIT (OUTPATIENT)
Dept: FAMILY MEDICINE CLINIC | Facility: CLINIC | Age: 35
End: 2020-01-22
Payer: COMMERCIAL

## 2020-01-22 VITALS
TEMPERATURE: 98 F | WEIGHT: 192 LBS | RESPIRATION RATE: 16 BRPM | OXYGEN SATURATION: 99 % | BODY MASS INDEX: 37.69 KG/M2 | SYSTOLIC BLOOD PRESSURE: 120 MMHG | HEART RATE: 76 BPM | HEIGHT: 60 IN | DIASTOLIC BLOOD PRESSURE: 82 MMHG

## 2020-01-22 DIAGNOSIS — S91.331D: ICD-10-CM

## 2020-01-22 DIAGNOSIS — M79.671 PAIN OF RIGHT HEEL: Primary | ICD-10-CM

## 2020-01-22 DIAGNOSIS — M79.671 PAIN OF RIGHT HEEL: ICD-10-CM

## 2020-01-22 PROCEDURE — 99213 OFFICE O/P EST LOW 20 MIN: CPT | Performed by: NURSE PRACTITIONER

## 2020-01-22 PROCEDURE — 73650 X-RAY EXAM OF HEEL: CPT | Performed by: NURSE PRACTITIONER

## 2020-01-22 RX ORDER — FAMOTIDINE 40 MG/1
40 TABLET, FILM COATED ORAL DAILY
COMMUNITY
End: 2021-05-19

## 2020-01-22 NOTE — PROGRESS NOTES
Jesica Peace is a 37year old female. HPI:   Patient presents today for an Immediate Care follow up. Patient stepped on a ceramic mug 3 weeks ago- mug broke and she swept up the pieces, what she stepped on was a piece that was not swept up.  Reports daily.     • Omega-3 Fatty Acids (OMEGA-3 FISH OIL OR) Take by mouth. • Cobalamine Combinations (B12 FOLATE OR) Take by mouth.      • escitalopram 5 MG Oral Tab 2.5 mg.    0   • VENTOLIN  (90 Base) MCG/ACT Inhalation Aero Soln 2 puffs every 6 pooja movement  CARDIO: RRR without murmur  EXTREMITIES: no cyanosis, clubbing or edema +2 posterior tibial pulses. Musculoskeletal: discomfort reported with palpation over wound of right heel. No range of motion deficits noted.   Neurological: nerves II through

## 2020-02-07 ENCOUNTER — APPOINTMENT (OUTPATIENT)
Dept: LAB | Age: 35
End: 2020-02-07
Attending: FAMILY MEDICINE
Payer: COMMERCIAL

## 2020-02-07 ENCOUNTER — OFFICE VISIT (OUTPATIENT)
Dept: FAMILY MEDICINE CLINIC | Facility: CLINIC | Age: 35
End: 2020-02-07
Payer: COMMERCIAL

## 2020-02-07 VITALS
DIASTOLIC BLOOD PRESSURE: 76 MMHG | HEIGHT: 60 IN | HEART RATE: 72 BPM | BODY MASS INDEX: 36.12 KG/M2 | WEIGHT: 184 LBS | RESPIRATION RATE: 12 BRPM | TEMPERATURE: 98 F | SYSTOLIC BLOOD PRESSURE: 116 MMHG

## 2020-02-07 DIAGNOSIS — R30.0 DYSURIA: ICD-10-CM

## 2020-02-07 DIAGNOSIS — E28.2 PCOS (POLYCYSTIC OVARIAN SYNDROME): ICD-10-CM

## 2020-02-07 DIAGNOSIS — R39.15 URINARY URGENCY: Primary | ICD-10-CM

## 2020-02-07 LAB
ALBUMIN SERPL-MCNC: 4 G/DL (ref 3.4–5)
ALBUMIN/GLOB SERPL: 1 {RATIO} (ref 1–2)
ALP LIVER SERPL-CCNC: 93 U/L (ref 37–98)
ALT SERPL-CCNC: 25 U/L (ref 13–56)
ANION GAP SERPL CALC-SCNC: 3 MMOL/L (ref 0–18)
APPEARANCE: CLEAR
AST SERPL-CCNC: 19 U/L (ref 15–37)
BILIRUB SERPL-MCNC: 0.5 MG/DL (ref 0.1–2)
BUN BLD-MCNC: 12 MG/DL (ref 7–18)
BUN/CREAT SERPL: 12.5 (ref 10–20)
CALCIUM BLD-MCNC: 9.5 MG/DL (ref 8.5–10.1)
CHLORIDE SERPL-SCNC: 106 MMOL/L (ref 98–112)
CO2 SERPL-SCNC: 29 MMOL/L (ref 21–32)
CREAT BLD-MCNC: 0.96 MG/DL (ref 0.55–1.02)
EST. AVERAGE GLUCOSE BLD GHB EST-MCNC: 105 MG/DL (ref 68–126)
GLOBULIN PLAS-MCNC: 4 G/DL (ref 2.8–4.4)
GLUCOSE BLD-MCNC: 92 MG/DL (ref 70–99)
HBA1C MFR BLD HPLC: 5.3 % (ref ?–5.7)
M PROTEIN MFR SERPL ELPH: 8 G/DL (ref 6.4–8.2)
MULTISTIX LOT#: NORMAL NUMERIC
OSMOLALITY SERPL CALC.SUM OF ELEC: 285 MOSM/KG (ref 275–295)
PATIENT FASTING Y/N/NP: NO
PH, URINE: 6 (ref 4.5–8)
POTASSIUM SERPL-SCNC: 3.8 MMOL/L (ref 3.5–5.1)
SODIUM SERPL-SCNC: 138 MMOL/L (ref 136–145)
SPECIFIC GRAVITY: 1 (ref 1–1.03)
URINE-COLOR: YELLOW
UROBILINOGEN,SEMI-QN: 0.2 MG/DL (ref 0–1.9)

## 2020-02-07 PROCEDURE — 36415 COLL VENOUS BLD VENIPUNCTURE: CPT | Performed by: FAMILY MEDICINE

## 2020-02-07 PROCEDURE — 87086 URINE CULTURE/COLONY COUNT: CPT | Performed by: FAMILY MEDICINE

## 2020-02-07 PROCEDURE — 81003 URINALYSIS AUTO W/O SCOPE: CPT | Performed by: FAMILY MEDICINE

## 2020-02-07 PROCEDURE — 83036 HEMOGLOBIN GLYCOSYLATED A1C: CPT | Performed by: FAMILY MEDICINE

## 2020-02-07 PROCEDURE — 80053 COMPREHEN METABOLIC PANEL: CPT | Performed by: FAMILY MEDICINE

## 2020-02-07 PROCEDURE — 99213 OFFICE O/P EST LOW 20 MIN: CPT | Performed by: FAMILY MEDICINE

## 2020-02-07 RX ORDER — ASCORBIC ACID, CHOLECALCIFEROL, .ALPHA.-TOCOPHEROL ACETATE, D-, THIAMINE HYDROCHLORIDE, RIBOFLAVIN, NIACIN, PYRIDOXINE HYDROCHLORIDE, LEVOMEFOLATE MAGNESIUM, FOLIC ACID, CYANOCOBALAMIN, IRON PENTACARBONYL, POTASSIUM IODIDE, MAGNESIUM OXIDE, DOCONEXENT, AND ICOSAPENT 55; 1000; 15; 1.3; 1.8; 5; 35; 600; 400; 14; 31; 200; 30; 200; 15 MG/1; [IU]/1; [IU]/1; MG/1; MG/1; MG/1; MG/1; UG/1; UG/1; UG/1; MG/1; UG/1; MG/1; MG/1; MG/1
CAPSULE, LIQUID FILLED ORAL
COMMUNITY
Start: 2020-01-27 | End: 2021-05-19 | Stop reason: ALTCHOICE

## 2020-02-07 RX ORDER — CEPHALEXIN 500 MG/1
500 CAPSULE ORAL 2 TIMES DAILY
Qty: 10 CAPSULE | Refills: 0 | Status: SHIPPED | OUTPATIENT
Start: 2020-02-07 | End: 2021-05-19 | Stop reason: ALTCHOICE

## 2020-02-07 NOTE — PROGRESS NOTES
Judd Abdi is a 37year old female. CHIEF COMPLAINT:   Possible uti  HPI:   Did a telemedicine visit on Friday last week for UTI symptoms - given augmentin for 3 days - felt \"great\" on day 3.   One day after stopping antibiotic, symptoms started Smokeless tobacco: Never Used    Alcohol use: No      Alcohol/week: 0.0 standard drinks    Drug use: No        REVIEW OF SYSTEMS:   GENERAL HEALTH: feels well otherwise  SKIN: denies any unusual skin lesions or rashes  RESPIRATORY: no shortness of breath

## 2020-02-22 DIAGNOSIS — K91.5 POST-CHOLECYSTECTOMY SYNDROME: ICD-10-CM

## 2020-02-23 RX ORDER — CHOLESTYRAMINE LIGHT 4 G/5.7G
POWDER, FOR SUSPENSION ORAL
Qty: 180 EACH | Refills: 0 | Status: SHIPPED | OUTPATIENT
Start: 2020-02-23 | End: 2020-09-08 | Stop reason: ALTCHOICE

## 2020-05-20 RX ORDER — METFORMIN HYDROCHLORIDE 750 MG/1
750 TABLET, EXTENDED RELEASE ORAL 2 TIMES DAILY WITH MEALS
Qty: 180 TABLET | Refills: 0 | Status: SHIPPED | OUTPATIENT
Start: 2020-05-20 | End: 2020-08-14

## 2020-05-20 NOTE — TELEPHONE ENCOUNTER
[facility] calling -patient asking for refill on Metformin 750-I don't see that you've refilled.      Last OV 2/7/20-UTI/ med check 10/19  Next OV not set up

## 2020-08-14 RX ORDER — METFORMIN HYDROCHLORIDE 750 MG/1
TABLET, EXTENDED RELEASE ORAL
Qty: 180 TABLET | Refills: 0 | Status: SHIPPED | OUTPATIENT
Start: 2020-08-14 | End: 2020-09-08

## 2020-09-08 ENCOUNTER — OFFICE VISIT (OUTPATIENT)
Dept: FAMILY MEDICINE CLINIC | Facility: CLINIC | Age: 35
End: 2020-09-08
Payer: COMMERCIAL

## 2020-09-08 ENCOUNTER — LAB ENCOUNTER (OUTPATIENT)
Dept: LAB | Age: 35
End: 2020-09-08
Attending: FAMILY MEDICINE
Payer: COMMERCIAL

## 2020-09-08 VITALS
SYSTOLIC BLOOD PRESSURE: 110 MMHG | WEIGHT: 189 LBS | DIASTOLIC BLOOD PRESSURE: 72 MMHG | RESPIRATION RATE: 16 BRPM | TEMPERATURE: 97 F | BODY MASS INDEX: 37.11 KG/M2 | HEIGHT: 60 IN | HEART RATE: 74 BPM | OXYGEN SATURATION: 98 %

## 2020-09-08 DIAGNOSIS — N92.0 MENORRHAGIA WITH REGULAR CYCLE: ICD-10-CM

## 2020-09-08 DIAGNOSIS — Z13.220 SCREENING FOR LIPID DISORDERS: ICD-10-CM

## 2020-09-08 DIAGNOSIS — E28.2 PCOS (POLYCYSTIC OVARIAN SYNDROME): ICD-10-CM

## 2020-09-08 DIAGNOSIS — E28.2 PCOS (POLYCYSTIC OVARIAN SYNDROME): Primary | ICD-10-CM

## 2020-09-08 LAB
ALBUMIN SERPL-MCNC: 3.8 G/DL (ref 3.4–5)
ALBUMIN/GLOB SERPL: 1.1 {RATIO} (ref 1–2)
ALP LIVER SERPL-CCNC: 73 U/L (ref 37–98)
ALT SERPL-CCNC: 20 U/L (ref 13–56)
ANION GAP SERPL CALC-SCNC: 6 MMOL/L (ref 0–18)
AST SERPL-CCNC: 18 U/L (ref 15–37)
BASOPHILS # BLD AUTO: 0.03 X10(3) UL (ref 0–0.2)
BASOPHILS NFR BLD AUTO: 0.4 %
BILIRUB SERPL-MCNC: 0.4 MG/DL (ref 0.1–2)
BUN BLD-MCNC: 13 MG/DL (ref 7–18)
BUN/CREAT SERPL: 13.4 (ref 10–20)
CALCIUM BLD-MCNC: 8.9 MG/DL (ref 8.5–10.1)
CHLORIDE SERPL-SCNC: 107 MMOL/L (ref 98–112)
CHOLEST SMN-MCNC: 133 MG/DL (ref ?–200)
CO2 SERPL-SCNC: 26 MMOL/L (ref 21–32)
CREAT BLD-MCNC: 0.97 MG/DL (ref 0.55–1.02)
DEPRECATED RDW RBC AUTO: 43.5 FL (ref 35.1–46.3)
EOSINOPHIL # BLD AUTO: 0.1 X10(3) UL (ref 0–0.7)
EOSINOPHIL NFR BLD AUTO: 1.5 %
ERYTHROCYTE [DISTWIDTH] IN BLOOD BY AUTOMATED COUNT: 13.9 % (ref 11–15)
EST. AVERAGE GLUCOSE BLD GHB EST-MCNC: 105 MG/DL (ref 68–126)
GLOBULIN PLAS-MCNC: 3.5 G/DL (ref 2.8–4.4)
GLUCOSE BLD-MCNC: 84 MG/DL (ref 70–99)
HBA1C MFR BLD HPLC: 5.3 % (ref ?–5.7)
HCT VFR BLD AUTO: 41.1 % (ref 35–48)
HDLC SERPL-MCNC: 58 MG/DL (ref 40–59)
HGB BLD-MCNC: 12.6 G/DL (ref 12–16)
IMM GRANULOCYTES # BLD AUTO: 0.02 X10(3) UL (ref 0–1)
IMM GRANULOCYTES NFR BLD: 0.3 %
LDLC SERPL CALC-MCNC: 60 MG/DL (ref ?–100)
LYMPHOCYTES # BLD AUTO: 2.59 X10(3) UL (ref 1–4)
LYMPHOCYTES NFR BLD AUTO: 38.5 %
M PROTEIN MFR SERPL ELPH: 7.3 G/DL (ref 6.4–8.2)
MCH RBC QN AUTO: 26.4 PG (ref 26–34)
MCHC RBC AUTO-ENTMCNC: 30.7 G/DL (ref 31–37)
MCV RBC AUTO: 86 FL (ref 80–100)
MONOCYTES # BLD AUTO: 0.57 X10(3) UL (ref 0.1–1)
MONOCYTES NFR BLD AUTO: 8.5 %
NEUTROPHILS # BLD AUTO: 3.42 X10 (3) UL (ref 1.5–7.7)
NEUTROPHILS # BLD AUTO: 3.42 X10(3) UL (ref 1.5–7.7)
NEUTROPHILS NFR BLD AUTO: 50.8 %
NONHDLC SERPL-MCNC: 75 MG/DL (ref ?–130)
OSMOLALITY SERPL CALC.SUM OF ELEC: 287 MOSM/KG (ref 275–295)
PATIENT FASTING Y/N/NP: YES
PATIENT FASTING Y/N/NP: YES
PLATELET # BLD AUTO: 315 10(3)UL (ref 150–450)
POTASSIUM SERPL-SCNC: 4 MMOL/L (ref 3.5–5.1)
RBC # BLD AUTO: 4.78 X10(6)UL (ref 3.8–5.3)
SODIUM SERPL-SCNC: 139 MMOL/L (ref 136–145)
TRIGL SERPL-MCNC: 73 MG/DL (ref 30–149)
VLDLC SERPL CALC-MCNC: 15 MG/DL (ref 0–30)
WBC # BLD AUTO: 6.7 X10(3) UL (ref 4–11)

## 2020-09-08 PROCEDURE — 80053 COMPREHEN METABOLIC PANEL: CPT | Performed by: FAMILY MEDICINE

## 2020-09-08 PROCEDURE — 80061 LIPID PANEL: CPT | Performed by: FAMILY MEDICINE

## 2020-09-08 PROCEDURE — 3008F BODY MASS INDEX DOCD: CPT | Performed by: FAMILY MEDICINE

## 2020-09-08 PROCEDURE — 85025 COMPLETE CBC W/AUTO DIFF WBC: CPT | Performed by: FAMILY MEDICINE

## 2020-09-08 PROCEDURE — 3078F DIAST BP <80 MM HG: CPT | Performed by: FAMILY MEDICINE

## 2020-09-08 PROCEDURE — 83036 HEMOGLOBIN GLYCOSYLATED A1C: CPT | Performed by: FAMILY MEDICINE

## 2020-09-08 PROCEDURE — 3074F SYST BP LT 130 MM HG: CPT | Performed by: FAMILY MEDICINE

## 2020-09-08 PROCEDURE — 99214 OFFICE O/P EST MOD 30 MIN: CPT | Performed by: FAMILY MEDICINE

## 2020-09-08 PROCEDURE — 36415 COLL VENOUS BLD VENIPUNCTURE: CPT | Performed by: FAMILY MEDICINE

## 2020-09-08 RX ORDER — METFORMIN HYDROCHLORIDE 750 MG/1
750 TABLET, EXTENDED RELEASE ORAL 2 TIMES DAILY WITH MEALS
Qty: 180 TABLET | Refills: 1 | Status: SHIPPED | OUTPATIENT
Start: 2020-09-08 | End: 2021-05-18

## 2020-09-08 NOTE — PROGRESS NOTES
Analisa Tariq is a 38year old female. CHIEF COMPLAINT   PCOS  HPI:   1year old- breast feeding. History of PCOS - diagnosed about 2008. Saw reproductive endocrine who was managing but now referred back her. Taking metformin ER 750mg BID.  No vomiting)    • Post-cholecystectomy syndrome       Social History:  Social History    Tobacco Use      Smoking status: Never Smoker      Smokeless tobacco: Never Used    Alcohol use: No      Alcohol/week: 0.0 standard drinks    Drug use: No       REVIEW OF

## 2021-02-24 PROBLEM — M65.4 DE QUERVAIN'S TENOSYNOVITIS, LEFT: Status: ACTIVE | Noted: 2021-02-24

## 2021-03-26 ENCOUNTER — TELEMEDICINE (OUTPATIENT)
Dept: TELEHEALTH | Age: 36
End: 2021-03-26

## 2021-03-26 DIAGNOSIS — R42 VERTIGO: Primary | ICD-10-CM

## 2021-03-26 DIAGNOSIS — H69.82 DYSFUNCTION OF LEFT EUSTACHIAN TUBE: ICD-10-CM

## 2021-03-26 PROCEDURE — 99213 OFFICE O/P EST LOW 20 MIN: CPT | Performed by: NURSE PRACTITIONER

## 2021-03-26 RX ORDER — AMOXICILLIN 875 MG/1
875 TABLET, COATED ORAL 2 TIMES DAILY
Qty: 20 TABLET | Refills: 0 | Status: SHIPPED | OUTPATIENT
Start: 2021-03-26 | End: 2021-04-05

## 2021-03-26 RX ORDER — MECLIZINE HYDROCHLORIDE 25 MG/1
TABLET ORAL
Qty: 30 TABLET | Refills: 0 | Status: SHIPPED | OUTPATIENT
Start: 2021-03-26 | End: 2021-05-19 | Stop reason: ALTCHOICE

## 2021-03-26 RX ORDER — METHYLPREDNISOLONE 4 MG/1
TABLET ORAL
Qty: 21 TABLET | Refills: 0 | Status: SHIPPED | OUTPATIENT
Start: 2021-03-26 | End: 2021-05-19 | Stop reason: ALTCHOICE

## 2021-03-28 NOTE — PROGRESS NOTES
CHIEF COMPLAINT:     Head congestion and vertigo    HPI:   Jesica Peace is a 38year old female who presents via [facility] Visit [facility] with a sudden onset of vertigo this morning.   Patient states that she has had nasal, sinus and ear congestion due t Oral Tab Take 40 mg by mouth daily. • BIOTIN OR Take by mouth. • NON FORMULARY       • Calcium Carb-Cholecalciferol (CALCIUM 1000 + D OR) Take by mouth. • Phytonadione (SUPERIORSOURCE K1 OR) Take by mouth.      • Omega-3 Fatty Acids (OMEGA-3 FIS Maternal Grandmother    • Bipolar Disorder Brother    • Diabetes Father         type 2   • Diabetes Maternal Grandfather         type 2   • Pulmonary Disease Maternal Grandfather       Social History    Tobacco Use      Smoking status: Never Smoker      Sm improving with each day. Advised to go directly to the ED for any worsening of symptoms.     See Patient Instructions

## 2021-04-03 ENCOUNTER — IMMUNIZATION (OUTPATIENT)
Dept: LAB | Age: 36
End: 2021-04-03
Attending: HOSPITALIST
Payer: COMMERCIAL

## 2021-04-03 DIAGNOSIS — Z23 NEED FOR VACCINATION: Primary | ICD-10-CM

## 2021-04-03 PROCEDURE — 0001A SARSCOV2 VAC 30MCG/0.3ML IM: CPT

## 2021-04-23 ENCOUNTER — PATIENT MESSAGE (OUTPATIENT)
Dept: ADMINISTRATIVE | Facility: HOSPITAL | Age: 36
End: 2021-04-23

## 2021-04-24 ENCOUNTER — IMMUNIZATION (OUTPATIENT)
Dept: LAB | Age: 36
End: 2021-04-24
Attending: HOSPITALIST
Payer: COMMERCIAL

## 2021-04-24 DIAGNOSIS — Z23 NEED FOR VACCINATION: Primary | ICD-10-CM

## 2021-04-24 PROCEDURE — 0002A SARSCOV2 VAC 30MCG/0.3ML IM: CPT

## 2021-05-18 RX ORDER — METFORMIN HYDROCHLORIDE 750 MG/1
750 TABLET, EXTENDED RELEASE ORAL 2 TIMES DAILY WITH MEALS
Qty: 180 TABLET | Refills: 0 | Status: SHIPPED | OUTPATIENT
Start: 2021-05-18 | End: 2021-08-21

## 2021-05-19 ENCOUNTER — OFFICE VISIT (OUTPATIENT)
Dept: FAMILY MEDICINE CLINIC | Facility: CLINIC | Age: 36
End: 2021-05-19
Payer: COMMERCIAL

## 2021-05-19 VITALS
TEMPERATURE: 99 F | SYSTOLIC BLOOD PRESSURE: 118 MMHG | BODY MASS INDEX: 35.53 KG/M2 | HEIGHT: 60 IN | RESPIRATION RATE: 20 BRPM | DIASTOLIC BLOOD PRESSURE: 82 MMHG | WEIGHT: 181 LBS | HEART RATE: 84 BPM

## 2021-05-19 DIAGNOSIS — R19.5 CHANGE IN STOOL: ICD-10-CM

## 2021-05-19 DIAGNOSIS — R10.13 EPIGASTRIC PAIN: Primary | ICD-10-CM

## 2021-05-19 DIAGNOSIS — K21.9 GASTROESOPHAGEAL REFLUX DISEASE, UNSPECIFIED WHETHER ESOPHAGITIS PRESENT: ICD-10-CM

## 2021-05-19 PROCEDURE — 3074F SYST BP LT 130 MM HG: CPT | Performed by: NURSE PRACTITIONER

## 2021-05-19 PROCEDURE — 3008F BODY MASS INDEX DOCD: CPT | Performed by: NURSE PRACTITIONER

## 2021-05-19 PROCEDURE — 99214 OFFICE O/P EST MOD 30 MIN: CPT | Performed by: NURSE PRACTITIONER

## 2021-05-19 PROCEDURE — 3079F DIAST BP 80-89 MM HG: CPT | Performed by: NURSE PRACTITIONER

## 2021-05-19 RX ORDER — DEXTROAMPHETAMINE SACCHARATE, AMPHETAMINE ASPARTATE, DEXTROAMPHETAMINE SULFATE AND AMPHETAMINE SULFATE 2.5; 2.5; 2.5; 2.5 MG/1; MG/1; MG/1; MG/1
TABLET ORAL
COMMUNITY
Start: 2020-10-10

## 2021-05-19 RX ORDER — PANTOPRAZOLE SODIUM 40 MG/1
40 TABLET, DELAYED RELEASE ORAL
Qty: 30 TABLET | Refills: 1 | Status: SHIPPED | OUTPATIENT
Start: 2021-05-19 | End: 2021-08-12

## 2021-05-21 ENCOUNTER — LAB ENCOUNTER (OUTPATIENT)
Dept: LAB | Age: 36
End: 2021-05-21
Attending: NURSE PRACTITIONER
Payer: COMMERCIAL

## 2021-05-21 DIAGNOSIS — R10.13 EPIGASTRIC PAIN: ICD-10-CM

## 2021-05-21 PROCEDURE — 85025 COMPLETE CBC W/AUTO DIFF WBC: CPT | Performed by: NURSE PRACTITIONER

## 2021-05-21 PROCEDURE — 80053 COMPREHEN METABOLIC PANEL: CPT | Performed by: NURSE PRACTITIONER

## 2021-05-25 ENCOUNTER — LAB ENCOUNTER (OUTPATIENT)
Dept: LAB | Age: 36
End: 2021-05-25
Attending: NURSE PRACTITIONER
Payer: COMMERCIAL

## 2021-05-25 ENCOUNTER — OFFICE VISIT (OUTPATIENT)
Dept: FAMILY MEDICINE CLINIC | Facility: CLINIC | Age: 36
End: 2021-05-25
Payer: COMMERCIAL

## 2021-05-25 VITALS
RESPIRATION RATE: 12 BRPM | SYSTOLIC BLOOD PRESSURE: 116 MMHG | BODY MASS INDEX: 33.99 KG/M2 | HEART RATE: 84 BPM | WEIGHT: 180 LBS | DIASTOLIC BLOOD PRESSURE: 70 MMHG | HEIGHT: 61 IN

## 2021-05-25 DIAGNOSIS — Z00.00 BLOOD TESTS FOR ROUTINE GENERAL PHYSICAL EXAMINATION: ICD-10-CM

## 2021-05-25 DIAGNOSIS — R19.5 CHANGE IN STOOL: ICD-10-CM

## 2021-05-25 DIAGNOSIS — Z00.00 ROUTINE GENERAL MEDICAL EXAMINATION AT A HEALTH CARE FACILITY: Primary | ICD-10-CM

## 2021-05-25 DIAGNOSIS — Z12.4 PAP SMEAR FOR CERVICAL CANCER SCREENING: ICD-10-CM

## 2021-05-25 DIAGNOSIS — R10.2 LEFT ADNEXAL TENDERNESS: ICD-10-CM

## 2021-05-25 DIAGNOSIS — Z11.51 SCREENING FOR HPV (HUMAN PAPILLOMAVIRUS): ICD-10-CM

## 2021-05-25 DIAGNOSIS — Z11.1 SCREENING FOR TUBERCULOSIS: ICD-10-CM

## 2021-05-25 DIAGNOSIS — K59.00 CONSTIPATION, UNSPECIFIED CONSTIPATION TYPE: ICD-10-CM

## 2021-05-25 PROCEDURE — 3074F SYST BP LT 130 MM HG: CPT | Performed by: FAMILY MEDICINE

## 2021-05-25 PROCEDURE — 83036 HEMOGLOBIN GLYCOSYLATED A1C: CPT | Performed by: FAMILY MEDICINE

## 2021-05-25 PROCEDURE — 3008F BODY MASS INDEX DOCD: CPT | Performed by: FAMILY MEDICINE

## 2021-05-25 PROCEDURE — 80061 LIPID PANEL: CPT | Performed by: FAMILY MEDICINE

## 2021-05-25 PROCEDURE — 3078F DIAST BP <80 MM HG: CPT | Performed by: FAMILY MEDICINE

## 2021-05-25 PROCEDURE — 87624 HPV HI-RISK TYP POOLED RSLT: CPT | Performed by: FAMILY MEDICINE

## 2021-05-25 PROCEDURE — 82272 OCCULT BLD FECES 1-3 TESTS: CPT | Performed by: NURSE PRACTITIONER

## 2021-05-25 PROCEDURE — 99395 PREV VISIT EST AGE 18-39: CPT | Performed by: FAMILY MEDICINE

## 2021-05-25 PROCEDURE — 86480 TB TEST CELL IMMUN MEASURE: CPT | Performed by: FAMILY MEDICINE

## 2021-05-25 PROCEDURE — 84443 ASSAY THYROID STIM HORMONE: CPT | Performed by: FAMILY MEDICINE

## 2021-05-25 NOTE — PROGRESS NOTES
CHIEF COMPLAINT   Well woman exam  HPI:   Sharon Torres is a 38year old female who presents for a complete physical exam.    Needs TB for school - prefers [employer]. Clinicals for CNA program   Colposcopy more than 10 years ago.  Biopsies were (ADDERALL) 10 MG Oral Tab 5mg bid     • Pantoprazole Sodium 40 MG Oral Tab EC Take 1 tablet (40 mg total) by mouth every morning before breakfast. 30 tablet 1   • metFORMIN HCl  MG Oral Tablet 24 Hr Take 1 tablet (750 mg total) by mouth 2 (two) times Bipolar Disorder Brother    • Diabetes Father         type 2   • Diabetes Maternal Grandfather         type 2   • Pulmonary Disease Maternal Grandfather       Social History:   Social History    Tobacco Use      Smoking status: Never Smoker      Smokeless in 1 year.   Routine general medical examination at a health care facility  (primary encounter diagnosis)  Blood tests for routine general physical examination  Screening for tuberculosis  Left adnexal tenderness  Constipation, unspecified constipation type

## 2021-05-26 ENCOUNTER — TELEPHONE (OUTPATIENT)
Dept: FAMILY MEDICINE CLINIC | Facility: CLINIC | Age: 36
End: 2021-05-26

## 2021-06-16 ENCOUNTER — HOSPITAL ENCOUNTER (OUTPATIENT)
Dept: ULTRASOUND IMAGING | Age: 36
Discharge: HOME | End: 2021-06-16
Attending: FAMILY MEDICINE
Payer: COMMERCIAL

## 2021-06-16 DIAGNOSIS — R10.2 LEFT ADNEXAL TENDERNESS: ICD-10-CM

## 2021-06-16 PROCEDURE — 76830 TRANSVAGINAL US NON-OB: CPT | Performed by: FAMILY MEDICINE

## 2021-06-16 PROCEDURE — 76856 US EXAM PELVIC COMPLETE: CPT | Performed by: FAMILY MEDICINE

## 2021-06-21 ENCOUNTER — OFFICE VISIT (OUTPATIENT)
Dept: UROLOGY | Facility: HOSPITAL | Age: 36
End: 2021-06-21
Attending: OBSTETRICS & GYNECOLOGY
Payer: COMMERCIAL

## 2021-06-21 VITALS — WEIGHT: 181 LBS | BODY MASS INDEX: 32.07 KG/M2 | HEIGHT: 63 IN | TEMPERATURE: 98 F

## 2021-06-21 DIAGNOSIS — R10.2 PELVIC PAIN: ICD-10-CM

## 2021-06-21 DIAGNOSIS — N81.84 PELVIC MUSCLE WASTING: ICD-10-CM

## 2021-06-21 DIAGNOSIS — N39.41 URGE INCONTINENCE: Primary | ICD-10-CM

## 2021-06-21 PROCEDURE — 99212 OFFICE O/P EST SF 10 MIN: CPT

## 2021-06-21 PROCEDURE — 87086 URINE CULTURE/COLONY COUNT: CPT | Performed by: OBSTETRICS & GYNECOLOGY

## 2021-06-21 PROCEDURE — 81002 URINALYSIS NONAUTO W/O SCOPE: CPT | Performed by: OBSTETRICS & GYNECOLOGY

## 2021-06-21 NOTE — PROGRESS NOTES
Cami De Leon DO  6/21/2021     Referred by Joanna Foster PA-C  Pt here with self    Patient presents with:  Pelvic Pain: Referred by Dr Harry Torres, Patient having LLQ abd pain on and off since April bowel incontinence.     LLQ sx intermittent, relat Never Used    Alcohol use: No      Alcohol/week: 0.0 standard drinks    Drug use: No       Allergies:    Codeine                 HALLUCINATION    Comment:Auditory hallucinations  Propoxyphene            NAUSEA AND VOMITING  Sulfa Antibiotics       NAUSEA A Pertinent positives noted in the the HPI.   No CP  No SOB    GENERAL EXAM:  GENERAL:  Alert and Oriented, and NAD  HEENT:  Normal, no lesions  LUNGS:  Normal effort  HEART:  RRR  ABDOMEN: soft, no mass, no hernia, +stria, scar  EXTREM:  Normal, no edema  S

## 2021-07-16 ENCOUNTER — OFFICE VISIT (OUTPATIENT)
Dept: RHEUMATOLOGY | Facility: CLINIC | Age: 36
End: 2021-07-16
Payer: COMMERCIAL

## 2021-07-16 VITALS
RESPIRATION RATE: 16 BRPM | DIASTOLIC BLOOD PRESSURE: 70 MMHG | WEIGHT: 185 LBS | TEMPERATURE: 98 F | BODY MASS INDEX: 34.93 KG/M2 | SYSTOLIC BLOOD PRESSURE: 118 MMHG | HEIGHT: 61 IN | HEART RATE: 84 BPM

## 2021-07-16 DIAGNOSIS — M25.50 DIFFUSE ARTHRALGIA: ICD-10-CM

## 2021-07-16 DIAGNOSIS — G89.29 NECK PAIN, CHRONIC: ICD-10-CM

## 2021-07-16 DIAGNOSIS — Z98.890 S/P REPAIR OF VENTRAL HERNIA: ICD-10-CM

## 2021-07-16 DIAGNOSIS — E88.81 METABOLIC SYNDROME: ICD-10-CM

## 2021-07-16 DIAGNOSIS — M79.675 PAIN OF LEFT GREAT TOE: ICD-10-CM

## 2021-07-16 DIAGNOSIS — M54.2 NECK PAIN, CHRONIC: ICD-10-CM

## 2021-07-16 DIAGNOSIS — M25.572 ARTHRALGIA OF LEFT FOOT: Primary | ICD-10-CM

## 2021-07-16 DIAGNOSIS — Z87.19 S/P REPAIR OF VENTRAL HERNIA: ICD-10-CM

## 2021-07-16 PROBLEM — E88.810 METABOLIC SYNDROME: Status: ACTIVE | Noted: 2021-07-16

## 2021-07-16 PROCEDURE — 99204 OFFICE O/P NEW MOD 45 MIN: CPT | Performed by: INTERNAL MEDICINE

## 2021-07-16 PROCEDURE — 3008F BODY MASS INDEX DOCD: CPT | Performed by: INTERNAL MEDICINE

## 2021-07-16 PROCEDURE — 3078F DIAST BP <80 MM HG: CPT | Performed by: INTERNAL MEDICINE

## 2021-07-16 PROCEDURE — 3074F SYST BP LT 130 MM HG: CPT | Performed by: INTERNAL MEDICINE

## 2021-07-16 RX ORDER — TRAMADOL HYDROCHLORIDE 50 MG/1
50 TABLET ORAL EVERY 8 HOURS PRN
Qty: 30 TABLET | Refills: 1 | Status: SHIPPED | OUTPATIENT
Start: 2021-07-16

## 2021-07-16 NOTE — PATIENT INSTRUCTIONS
Plan - labs will be ordered will out autoimmune disease including the rheumatoid factor, JHONATHAN, sed rate, C-reactive protein and CCP antibody. X-rays will be done of the cervical spine, neck and x-ray of the left foot to look for spurs of osteoarthritis.   I

## 2021-07-16 NOTE — PROGRESS NOTES
[facility]  Report of Consultation    [address] Patient Status:  No patient class for patient encounter    1985 MRN [id number]   Location [facility] PCP Richard Enciso MD     Date of Consult:  21    Reason for Cons 10, 6, 2.  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 Non-smoker. Nondrinker.     Allergies:   Codeine                 HALLUCINATION    Comment:Auditory hallucinations  Propoxyphene            NAUSEA AND VOMITING  Sulfa Antibiotics       NAUSEA AND VOMITING    Comment difficulty with vision or hearing. Joint pain/joint swelling-see HPI. No muscle pain. No leg swelling. Skin - acne on face, worse in sun.      Physical Exam:/70   Pulse 84   Temp 98.4 °F (36.9 °C)   Resp 16   Ht 5' 1\" (1.549 m)   Wt 185 lb (83.9 kg spine, neck and x-ray of the left foot to look for spurs of osteoarthritis. If labs and x-rays are negative and most likely were dealing with soft tissue problems. I will call you once I get the results of labs and x-rays to discuss.   3 possibilities are

## 2021-08-04 ENCOUNTER — LAB ENCOUNTER (OUTPATIENT)
Dept: LAB | Age: 36
End: 2021-08-04
Attending: INTERNAL MEDICINE
Payer: COMMERCIAL

## 2021-08-04 DIAGNOSIS — M79.675 PAIN OF LEFT GREAT TOE: ICD-10-CM

## 2021-08-04 DIAGNOSIS — M54.2 NECK PAIN, CHRONIC: ICD-10-CM

## 2021-08-04 DIAGNOSIS — G89.29 NECK PAIN, CHRONIC: ICD-10-CM

## 2021-08-04 DIAGNOSIS — M25.572 ARTHRALGIA OF LEFT FOOT: ICD-10-CM

## 2021-08-04 LAB
CRP SERPL-MCNC: 0.35 MG/DL (ref ?–0.3)
RHEUMATOID FACT SERPL-ACNC: <10 IU/ML (ref ?–15)
SED RATE-ML: 10 MM/HR

## 2021-08-04 PROCEDURE — 86200 CCP ANTIBODY: CPT | Performed by: INTERNAL MEDICINE

## 2021-08-04 PROCEDURE — 86431 RHEUMATOID FACTOR QUANT: CPT | Performed by: INTERNAL MEDICINE

## 2021-08-04 PROCEDURE — 86140 C-REACTIVE PROTEIN: CPT | Performed by: INTERNAL MEDICINE

## 2021-08-04 PROCEDURE — 85652 RBC SED RATE AUTOMATED: CPT | Performed by: INTERNAL MEDICINE

## 2021-08-04 PROCEDURE — 86038 ANTINUCLEAR ANTIBODIES: CPT | Performed by: INTERNAL MEDICINE

## 2021-08-06 LAB
ANTI-NUCLEAR ANTIBODY (ANA), HEP-2 IGG: DETECTED
CCP IGG SERPL-ACNC: 2 U/ML (ref 0–6.9)

## 2021-08-07 ENCOUNTER — HOSPITAL ENCOUNTER (OUTPATIENT)
Dept: GENERAL RADIOLOGY | Age: 36
Discharge: HOME | End: 2021-08-07
Attending: INTERNAL MEDICINE
Payer: COMMERCIAL

## 2021-08-07 DIAGNOSIS — M54.2 NECK PAIN, CHRONIC: ICD-10-CM

## 2021-08-07 DIAGNOSIS — G89.29 NECK PAIN, CHRONIC: ICD-10-CM

## 2021-08-07 DIAGNOSIS — M79.675 PAIN OF LEFT GREAT TOE: ICD-10-CM

## 2021-08-07 PROCEDURE — 73630 X-RAY EXAM OF FOOT: CPT | Performed by: INTERNAL MEDICINE

## 2021-08-07 PROCEDURE — 72050 X-RAY EXAM NECK SPINE 4/5VWS: CPT | Performed by: INTERNAL MEDICINE

## 2021-08-10 NOTE — TELEPHONE ENCOUNTER
Moore called. Given results left x-ray of foot negative. X-ray of cervical spine negative. Lab tests unimpressive. CRP borderline at 0.35. JHONATHAN slightly +1-80 rheumatoid factor negative CCP antibody negative sed rate 10.   Patient to see me in the office

## 2021-08-12 ENCOUNTER — TELEMEDICINE (OUTPATIENT)
Dept: RHEUMATOLOGY | Facility: CLINIC | Age: 36
End: 2021-08-12
Payer: COMMERCIAL

## 2021-08-12 VITALS
BODY MASS INDEX: 34.55 KG/M2 | WEIGHT: 183 LBS | HEIGHT: 61 IN | TEMPERATURE: 98 F | SYSTOLIC BLOOD PRESSURE: 118 MMHG | RESPIRATION RATE: 60 BRPM | DIASTOLIC BLOOD PRESSURE: 68 MMHG

## 2021-08-12 DIAGNOSIS — G89.29 CHRONIC NECK PAIN: Primary | ICD-10-CM

## 2021-08-12 DIAGNOSIS — M54.2 CHRONIC NECK PAIN: Primary | ICD-10-CM

## 2021-08-12 DIAGNOSIS — R76.8 POSITIVE ANA (ANTINUCLEAR ANTIBODY): ICD-10-CM

## 2021-08-12 DIAGNOSIS — M79.675 PAIN OF LEFT GREAT TOE: ICD-10-CM

## 2021-08-12 PROCEDURE — 3074F SYST BP LT 130 MM HG: CPT | Performed by: INTERNAL MEDICINE

## 2021-08-12 PROCEDURE — 3078F DIAST BP <80 MM HG: CPT | Performed by: INTERNAL MEDICINE

## 2021-08-12 PROCEDURE — 99214 OFFICE O/P EST MOD 30 MIN: CPT | Performed by: INTERNAL MEDICINE

## 2021-08-12 PROCEDURE — 3008F BODY MASS INDEX DOCD: CPT | Performed by: INTERNAL MEDICINE

## 2021-08-12 RX ORDER — TRAMADOL HYDROCHLORIDE 50 MG/1
50 TABLET ORAL EVERY 8 HOURS PRN
Qty: 30 TABLET | Refills: 1 | Status: CANCELLED | OUTPATIENT
Start: 2021-09-14

## 2021-08-12 NOTE — PATIENT INSTRUCTIONS
Use Tramadol 50 mg as needed generally every third night. Use as little Tramadol as possible to control your muscle aches and joint pain. Current labs show a positive JHONATHAN of 1-80, normal sed rate, normal C-reactive protein negative rheumatoid factor.   X-

## 2021-08-12 NOTE — PROGRESS NOTES
[facility]  Dr. Mahogany Geiger Progress Note     Subjective:   Elise Rosa is a(n) 43year old female. Current complaints: Patient presents with: Follow - Up: 3 week f/u. Takes pain meds every couple days. Pain has subsided, but not completly gone.

## 2021-08-21 RX ORDER — METFORMIN HYDROCHLORIDE 750 MG/1
TABLET, EXTENDED RELEASE ORAL
Qty: 180 TABLET | Refills: 0 | Status: SHIPPED | OUTPATIENT
Start: 2021-08-21 | End: 2022-01-03

## 2021-10-08 ENCOUNTER — TELEPHONE (OUTPATIENT)
Dept: FAMILY MEDICINE CLINIC | Facility: CLINIC | Age: 36
End: 2021-10-08

## 2021-10-08 NOTE — TELEPHONE ENCOUNTER
She had labs done the end of May which were normal.  She has an appointment with Dr. Tony Bond pending on 12/7. I would advise to see him first, see if he wants labs and then can order at that time if needed.

## 2021-10-08 NOTE — TELEPHONE ENCOUNTER
Pt is requesting an order for CMP and whatever else she needs for her 6 months follow up labs? Please advise.

## 2021-10-19 ENCOUNTER — OFFICE VISIT (OUTPATIENT)
Dept: FAMILY MEDICINE CLINIC | Facility: CLINIC | Age: 36
End: 2021-10-19
Payer: COMMERCIAL

## 2021-10-19 VITALS
HEART RATE: 86 BPM | TEMPERATURE: 98 F | BODY MASS INDEX: 35.3 KG/M2 | OXYGEN SATURATION: 98 % | DIASTOLIC BLOOD PRESSURE: 74 MMHG | SYSTOLIC BLOOD PRESSURE: 130 MMHG | RESPIRATION RATE: 18 BRPM | HEIGHT: 61 IN | WEIGHT: 187 LBS

## 2021-10-19 DIAGNOSIS — R39.9 UTI SYMPTOMS: Primary | ICD-10-CM

## 2021-10-19 DIAGNOSIS — B37.9 ANTIBIOTIC-INDUCED YEAST INFECTION: ICD-10-CM

## 2021-10-19 DIAGNOSIS — N30.01 ACUTE CYSTITIS WITH HEMATURIA: ICD-10-CM

## 2021-10-19 DIAGNOSIS — T36.95XA ANTIBIOTIC-INDUCED YEAST INFECTION: ICD-10-CM

## 2021-10-19 PROCEDURE — 99213 OFFICE O/P EST LOW 20 MIN: CPT | Performed by: NURSE PRACTITIONER

## 2021-10-19 PROCEDURE — 3008F BODY MASS INDEX DOCD: CPT | Performed by: NURSE PRACTITIONER

## 2021-10-19 PROCEDURE — 87086 URINE CULTURE/COLONY COUNT: CPT | Performed by: NURSE PRACTITIONER

## 2021-10-19 PROCEDURE — 3075F SYST BP GE 130 - 139MM HG: CPT | Performed by: NURSE PRACTITIONER

## 2021-10-19 PROCEDURE — 3078F DIAST BP <80 MM HG: CPT | Performed by: NURSE PRACTITIONER

## 2021-10-19 PROCEDURE — 81003 URINALYSIS AUTO W/O SCOPE: CPT | Performed by: NURSE PRACTITIONER

## 2021-10-19 RX ORDER — NITROFURANTOIN 25; 75 MG/1; MG/1
CAPSULE ORAL
Qty: 14 CAPSULE | Refills: 0 | Status: SHIPPED | OUTPATIENT
Start: 2021-10-19 | End: 2021-10-19 | Stop reason: CLARIF

## 2021-10-19 RX ORDER — CEPHALEXIN 500 MG/1
500 TABLET ORAL 2 TIMES DAILY
Qty: 14 TABLET | Refills: 0 | Status: SHIPPED | OUTPATIENT
Start: 2021-10-19 | End: 2021-10-26

## 2021-10-19 RX ORDER — FLUCONAZOLE 150 MG/1
150 TABLET ORAL ONCE
Qty: 1 TABLET | Refills: 0 | Status: SHIPPED | OUTPATIENT
Start: 2021-10-19 | End: 2021-10-19

## 2021-10-19 NOTE — PROGRESS NOTES
Jessica Lechuga is a 43year old female. CHIEF COMPLAINT:   Patient presents with:  UTI: 1 day frequent urination, blood in urine OTC azo last dose 630AM       HPI:     Patient presents with symptoms of UTI.  Last evening she noticed some discomfort in Social History:  Social History    Tobacco Use      Smoking status: Never Smoker      Smokeless tobacco: Never Used    Alcohol use: No      Alcohol/week: 0.0 standard drinks    Drug use: No        REVIEW OF SYSTEMS:   GENERAL: Denies night sweats, chi Prescriptions Disp Refills   • fluconazole 150 MG Oral Tab 1 tablet 0     Sig: Take 1 tablet (150 mg total) by mouth once for 1 dose. • Cephalexin 500 MG Oral Tab 14 tablet 0     Sig: Take 500 mg by mouth 2 (two) times daily for 7 days.        Patient was infections are not contagious. You can't get one from someone else, from a toilet seat, or from sharing a bath. The most common cause of bladder infections is bacteria from the bowels. The bacteria get onto the skin around the opening of the urethra.  From bathroom is important. Wipe from front to back after using the toilet to prevent the spread of bacteria. · Urinate more often. Don't try to hold urine in for a long time. · Wear loose-fitting clothes and cotton underwear. Avoid tight-fitting pants.   · Im indicates understanding of these issues and agrees to the plan. 18

## 2021-10-22 ENCOUNTER — OFFICE VISIT (OUTPATIENT)
Dept: FAMILY MEDICINE CLINIC | Facility: CLINIC | Age: 36
End: 2021-10-22
Payer: COMMERCIAL

## 2021-10-22 VITALS
TEMPERATURE: 99 F | SYSTOLIC BLOOD PRESSURE: 124 MMHG | BODY MASS INDEX: 35.3 KG/M2 | RESPIRATION RATE: 18 BRPM | HEIGHT: 61 IN | DIASTOLIC BLOOD PRESSURE: 78 MMHG | WEIGHT: 187 LBS | HEART RATE: 80 BPM

## 2021-10-22 DIAGNOSIS — M54.50 CHRONIC LEFT-SIDED LOW BACK PAIN, UNSPECIFIED WHETHER SCIATICA PRESENT: ICD-10-CM

## 2021-10-22 DIAGNOSIS — R31.0 GROSS HEMATURIA: Primary | ICD-10-CM

## 2021-10-22 DIAGNOSIS — G89.29 CHRONIC LEFT-SIDED LOW BACK PAIN, UNSPECIFIED WHETHER SCIATICA PRESENT: ICD-10-CM

## 2021-10-22 PROCEDURE — 99213 OFFICE O/P EST LOW 20 MIN: CPT | Performed by: FAMILY MEDICINE

## 2021-10-22 PROCEDURE — 3074F SYST BP LT 130 MM HG: CPT | Performed by: FAMILY MEDICINE

## 2021-10-22 PROCEDURE — 3008F BODY MASS INDEX DOCD: CPT | Performed by: FAMILY MEDICINE

## 2021-10-22 PROCEDURE — 3078F DIAST BP <80 MM HG: CPT | Performed by: FAMILY MEDICINE

## 2021-10-22 RX ORDER — DIAZEPAM 2 MG/1
TABLET ORAL AS NEEDED
COMMUNITY
Start: 2021-10-16

## 2021-10-22 NOTE — PROGRESS NOTES
Mallory Hendricks is a 43year old female. CHIEF COMPLAINT   Follow-up after walk-in clinic visit for possible UTI  HPI:   Seen at walk-in clinic on 10/19/2021. HPI from the walk-in clinic visit as follows:  \"Patient presents with symptoms of UTI.  Last 5 MG Oral Tab 2.5 mg daily. 0   • Vitamin D3 1000 units Oral Tab Take 1,000 Units by mouth daily.           Past Medical History:   Diagnosis Date   • Anesthesia complication     HARD TO AWAKEN   • Asthma     due bouts of flu   • Neurocardiogenic syncope

## 2021-11-30 ENCOUNTER — OFFICE VISIT (OUTPATIENT)
Dept: PHYSICAL MEDICINE AND REHAB | Facility: CLINIC | Age: 36
End: 2021-11-30
Payer: COMMERCIAL

## 2021-11-30 VITALS
BODY MASS INDEX: 35.3 KG/M2 | SYSTOLIC BLOOD PRESSURE: 134 MMHG | HEART RATE: 91 BPM | WEIGHT: 187 LBS | HEIGHT: 61 IN | DIASTOLIC BLOOD PRESSURE: 84 MMHG | OXYGEN SATURATION: 99 %

## 2021-11-30 DIAGNOSIS — K43.2 RECURRENT VENTRAL HERNIA: ICD-10-CM

## 2021-11-30 DIAGNOSIS — M47.816 FACET SYNDROME, LUMBAR: Primary | ICD-10-CM

## 2021-11-30 DIAGNOSIS — F30.9 BIPOLAR I DISORDER, SINGLE MANIC EPISODE (HCC): ICD-10-CM

## 2021-11-30 DIAGNOSIS — K21.9 GASTROESOPHAGEAL REFLUX DISEASE, UNSPECIFIED WHETHER ESOPHAGITIS PRESENT: ICD-10-CM

## 2021-11-30 PROCEDURE — 3008F BODY MASS INDEX DOCD: CPT | Performed by: PHYSICAL MEDICINE & REHABILITATION

## 2021-11-30 PROCEDURE — 3075F SYST BP GE 130 - 139MM HG: CPT | Performed by: PHYSICAL MEDICINE & REHABILITATION

## 2021-11-30 PROCEDURE — 99204 OFFICE O/P NEW MOD 45 MIN: CPT | Performed by: PHYSICAL MEDICINE & REHABILITATION

## 2021-11-30 PROCEDURE — 3079F DIAST BP 80-89 MM HG: CPT | Performed by: PHYSICAL MEDICINE & REHABILITATION

## 2021-11-30 RX ORDER — METHYLPREDNISOLONE 4 MG/1
TABLET ORAL
Qty: 1 EACH | Refills: 0 | Status: SHIPPED | OUTPATIENT
Start: 2021-11-30

## 2021-11-30 NOTE — PROGRESS NOTES
[address]  NEW PATIENT EVALUATION    Chief Complaint: back pain. HISTORY OF PRESENT ILLNESS:   Patient presents with:  Hip Pain: New R. handed pt. is here for left hip pain. Pain has been chronic.  Locali bouts of flu   • Neurocardiogenic syncope     had as teen, outgrew   • Polycystic disease, ovaries    • PONV (postoperative nausea and vomiting)    • Post-cholecystectomy syndrome          PAST SURGICAL HISTORY:     Past Surgical History:   Procedure Later VOMITING  Sulfa Antibiotics       NAUSEA AND VOMITING    Comment:GI upset  Biaxin [Clarithromy*    OTHER (SEE COMMENTS)    Comment:GI upset  Erythromycin            RASH    Comment:Derivatives GI upset      FAMILY HISTORY:     Family History   Problem Rela denies   Neurological  Loss of Strength Since last Visit: denies  Tingling/Numbness: denies  Balance: denies   Psychiatric  Anxiety: admits  Depressed Mood: admits          PHYSICAL EXAM:   /84   Pulse 91   Ht 61\"   Wt 187 lb (84.8 kg)   LMP 10/05/2 MCHC 31.2 05/21/2021    RDW 13.3 05/21/2021    .0 05/21/2021     Lab Results   Component Value Date    GLU 83 05/21/2021    BUN 10 05/21/2021    BUNCREA 11.4 05/21/2021    CREATSERUM 0.88 05/21/2021    ANIONGAP 8 05/21/2021    GFR 84 11/28/2017

## 2021-11-30 NOTE — PATIENT INSTRUCTIONS
-Start PT and home exercises  -Medrol dose pack to be started today  -Ice/Heat as much as tolerated  -Xray of the lumbar spine today  -Follow up in 4 weeks  -If no better, will consider MRI

## 2021-12-01 ENCOUNTER — TELEPHONE (OUTPATIENT)
Dept: PHYSICAL MEDICINE AND REHAB | Facility: CLINIC | Age: 36
End: 2021-12-01

## 2021-12-01 NOTE — TELEPHONE ENCOUNTER
Patient called and stated Dr. Filomena Tracey made innaccurate notes from her last visit. Patient wants \"bipolar\" taken off her notes and her medical record because she is not bipolar. Please reach out to patient if needed.

## 2021-12-07 ENCOUNTER — TELEPHONE (OUTPATIENT)
Dept: FAMILY MEDICINE CLINIC | Facility: CLINIC | Age: 36
End: 2021-12-07

## 2021-12-07 DIAGNOSIS — E28.2 PCOS (POLYCYSTIC OVARIAN SYNDROME): Primary | ICD-10-CM

## 2021-12-07 NOTE — TELEPHONE ENCOUNTER
Please see note below. Pt had labs on  05/25/2021. Does she need anything else prior to the visit on 01/03/2022?

## 2021-12-07 NOTE — TELEPHONE ENCOUNTER
Pt scheduled a med check on 1/3/2022    Pt is requesting lab orders be placed before then so it can be discussed during her visit. Pt will like a call back once orders have been placed.

## 2021-12-09 ENCOUNTER — HOSPITAL ENCOUNTER (OUTPATIENT)
Dept: GENERAL RADIOLOGY | Age: 36
Discharge: HOME | End: 2021-12-09
Attending: PHYSICAL MEDICINE & REHABILITATION
Payer: COMMERCIAL

## 2021-12-09 DIAGNOSIS — M47.816 FACET SYNDROME, LUMBAR: ICD-10-CM

## 2021-12-09 PROCEDURE — 72114 X-RAY EXAM L-S SPINE BENDING: CPT | Performed by: PHYSICAL MEDICINE & REHABILITATION

## 2021-12-13 ENCOUNTER — TELEPHONE (OUTPATIENT)
Dept: PHYSICAL THERAPY | Facility: HOSPITAL | Age: 36
End: 2021-12-13

## 2021-12-14 ENCOUNTER — OFFICE VISIT (OUTPATIENT)
Dept: PHYSICAL THERAPY | Age: 36
End: 2021-12-14
Attending: PHYSICAL MEDICINE & REHABILITATION
Payer: COMMERCIAL

## 2021-12-14 DIAGNOSIS — M47.816 FACET SYNDROME, LUMBAR: ICD-10-CM

## 2021-12-14 PROCEDURE — 97110 THERAPEUTIC EXERCISES: CPT | Performed by: PHYSICAL THERAPIST

## 2021-12-14 PROCEDURE — 97162 PT EVAL MOD COMPLEX 30 MIN: CPT | Performed by: PHYSICAL THERAPIST

## 2021-12-14 NOTE — PROGRESS NOTES
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 Today’s Treatment:  KIKI, REIL, bridging, PKB, prone hip ext, supine ab curl, pt education  HEP: Handouts given  Pt.  Education: Body mechanics for safe ADLs  Patient c recently had thoughts of hurting yourself?   No    Have you tried to hurt yourself in the past?  No

## 2021-12-17 ENCOUNTER — OFFICE VISIT (OUTPATIENT)
Dept: PHYSICAL THERAPY | Age: 36
End: 2021-12-17
Attending: PHYSICAL MEDICINE & REHABILITATION
Payer: COMMERCIAL

## 2021-12-17 PROCEDURE — 97110 THERAPEUTIC EXERCISES: CPT | Performed by: PHYSICAL THERAPIST

## 2021-12-17 NOTE — PROGRESS NOTES
Dx: Facet syndrome, lumbar (M47.816)         Authorized # of Visits:  8         Next MD visit: none scheduled  Fall Risk: standard         Precautions: n/a           Medication Changes since last visit?: No     Subjective: doing HEP - it really helps!  Feel

## 2021-12-21 ENCOUNTER — OFFICE VISIT (OUTPATIENT)
Dept: PHYSICAL THERAPY | Age: 36
End: 2021-12-21
Attending: PHYSICAL MEDICINE & REHABILITATION
Payer: COMMERCIAL

## 2021-12-21 PROCEDURE — 97110 THERAPEUTIC EXERCISES: CPT | Performed by: PHYSICAL THERAPIST

## 2021-12-21 NOTE — PROGRESS NOTES
Dx: Facet syndrome, lumbar (M47.816)         Authorized # of Visits:  8         Next MD visit: none scheduled  Fall Risk: standard         Precautions: n/a           Medication Changes since last visit?: No     Subjective: doing HEP - it really helps!  Feel be independent with home exercise program and self management    Plan: cont current HEP, increase trunk stab gradually, add side planks and try lumbar support when sitting, monitor sx response.     Skilled Services: TE    Charges: TE3       Total Timed Starla

## 2021-12-23 ENCOUNTER — OFFICE VISIT (OUTPATIENT)
Dept: PHYSICAL THERAPY | Age: 36
End: 2021-12-23
Attending: PHYSICAL MEDICINE & REHABILITATION
Payer: COMMERCIAL

## 2021-12-23 ENCOUNTER — TELEPHONE (OUTPATIENT)
Dept: PHYSICAL THERAPY | Facility: HOSPITAL | Age: 36
End: 2021-12-23

## 2021-12-23 PROCEDURE — 97110 THERAPEUTIC EXERCISES: CPT | Performed by: PHYSICAL THERAPIST

## 2021-12-23 NOTE — PROGRESS NOTES
Dx: Facet syndrome, lumbar (M47.816)         Authorized # of Visits:  8         Next MD visit: none scheduled  Fall Risk: standard         Precautions: n/a           Medication Changes since last visit?: No     Subjective: doing HEP and sleeping better.  Sh able to tolerate standing for 10 minutes without increased symptoms. · Pt. will be able to perform ADLs with no pain.   · Pt. will be independent with home exercise program and self management    Plan: cont current HEP, increase trunk stab gradually, progr

## 2021-12-28 ENCOUNTER — TELEPHONE (OUTPATIENT)
Dept: PHYSICAL THERAPY | Facility: HOSPITAL | Age: 36
End: 2021-12-28

## 2021-12-28 ENCOUNTER — APPOINTMENT (OUTPATIENT)
Dept: PHYSICAL THERAPY | Age: 36
End: 2021-12-28
Attending: PHYSICAL MEDICINE & REHABILITATION
Payer: COMMERCIAL

## 2021-12-30 ENCOUNTER — TELEPHONE (OUTPATIENT)
Dept: PHYSICAL THERAPY | Facility: HOSPITAL | Age: 36
End: 2021-12-30

## 2021-12-30 ENCOUNTER — TELEMEDICINE (OUTPATIENT)
Dept: PHYSICAL MEDICINE AND REHAB | Facility: CLINIC | Age: 36
End: 2021-12-30

## 2021-12-30 ENCOUNTER — APPOINTMENT (OUTPATIENT)
Dept: PHYSICAL THERAPY | Age: 36
End: 2021-12-30
Attending: PHYSICAL MEDICINE & REHABILITATION
Payer: COMMERCIAL

## 2021-12-30 DIAGNOSIS — K21.9 GASTROESOPHAGEAL REFLUX DISEASE, UNSPECIFIED WHETHER ESOPHAGITIS PRESENT: ICD-10-CM

## 2021-12-30 DIAGNOSIS — F30.9 BIPOLAR I DISORDER, SINGLE MANIC EPISODE (HCC): ICD-10-CM

## 2021-12-30 DIAGNOSIS — M47.816 FACET SYNDROME, LUMBAR: Primary | ICD-10-CM

## 2021-12-30 PROCEDURE — 99214 OFFICE O/P EST MOD 30 MIN: CPT | Performed by: PHYSICAL MEDICINE & REHABILITATION

## 2021-12-30 NOTE — PROGRESS NOTES
[address]    Telemedicine Visit - Follow Up Evaluation    Telehealth Verbal Consent   I conducted a telehealth visit with Amado Tabor today, 12/30/21, which was completed using two-way, real-time int severity of her pain. Pain is still localized in the low back. She denies any radiating symptoms in the lower extremities, any numbness tingling, any weakness, any changes in bowel bladder or saddle anesthesia.   Pain is worsened with standing ambulation 0.05 % External Cream Apply to face nightly 45 g 1   • amphetamine-dextroamphetamine 10 MG Oral Tab 5mg bid     • BIOTIN OR Take by mouth. • NON FORMULARY       • Calcium Carb-Cholecalciferol (CALCIUM 1000 + D OR) Take by mouth.      • Phytonadione (SUP deformity  Cardiovascular: No cyanosis, clubbing or edema  Respiratory: Non-labored respirations  Skin: No lesions noted   Neurological: alert & oriented x 3, attentive, able to follow commands, comprehention intact, spontaneous speech intact  Psychiatric: continue this with home exercises. She will follow-up in 4 to 6 weeks in the office at this point if she continues to be limited we will consider MRI imaging for further evaluation.     Follow-up:  1 month    We discussed that a telemedicine visit is in pl

## 2022-01-03 ENCOUNTER — LAB ENCOUNTER (OUTPATIENT)
Dept: LAB | Age: 37
End: 2022-01-03
Attending: FAMILY MEDICINE
Payer: COMMERCIAL

## 2022-01-03 ENCOUNTER — OFFICE VISIT (OUTPATIENT)
Dept: PHYSICAL THERAPY | Age: 37
End: 2022-01-03
Attending: PHYSICAL MEDICINE & REHABILITATION
Payer: COMMERCIAL

## 2022-01-03 ENCOUNTER — OFFICE VISIT (OUTPATIENT)
Dept: FAMILY MEDICINE CLINIC | Facility: CLINIC | Age: 37
End: 2022-01-03
Payer: COMMERCIAL

## 2022-01-03 VITALS — WEIGHT: 190 LBS | HEIGHT: 61 IN | BODY MASS INDEX: 35.87 KG/M2

## 2022-01-03 DIAGNOSIS — E28.2 PCOS (POLYCYSTIC OVARIAN SYNDROME): Primary | ICD-10-CM

## 2022-01-03 DIAGNOSIS — E28.2 PCOS (POLYCYSTIC OVARIAN SYNDROME): ICD-10-CM

## 2022-01-03 LAB
ALBUMIN SERPL-MCNC: 4.1 G/DL (ref 3.4–5)
ALBUMIN/GLOB SERPL: 1.2 {RATIO} (ref 1–2)
ALP LIVER SERPL-CCNC: 89 U/L
ALT SERPL-CCNC: 29 U/L
ANION GAP SERPL CALC-SCNC: 5 MMOL/L (ref 0–18)
AST SERPL-CCNC: 27 U/L (ref 15–37)
BILIRUB SERPL-MCNC: 0.3 MG/DL (ref 0.1–2)
BUN BLD-MCNC: 9 MG/DL (ref 7–18)
CALCIUM BLD-MCNC: 9.2 MG/DL (ref 8.5–10.1)
CHLORIDE SERPL-SCNC: 104 MMOL/L (ref 98–112)
CO2 SERPL-SCNC: 27 MMOL/L (ref 21–32)
CREAT BLD-MCNC: 0.94 MG/DL
EST. AVERAGE GLUCOSE BLD GHB EST-MCNC: 105 MG/DL (ref 68–126)
FASTING STATUS PATIENT QL REPORTED: YES
GLOBULIN PLAS-MCNC: 3.5 G/DL (ref 2.8–4.4)
GLUCOSE BLD-MCNC: 100 MG/DL (ref 70–99)
HBA1C MFR BLD: 5.3 % (ref ?–5.7)
OSMOLALITY SERPL CALC.SUM OF ELEC: 281 MOSM/KG (ref 275–295)
POTASSIUM SERPL-SCNC: 3.8 MMOL/L (ref 3.5–5.1)
PROT SERPL-MCNC: 7.6 G/DL (ref 6.4–8.2)
SODIUM SERPL-SCNC: 136 MMOL/L (ref 136–145)

## 2022-01-03 PROCEDURE — 3008F BODY MASS INDEX DOCD: CPT | Performed by: FAMILY MEDICINE

## 2022-01-03 PROCEDURE — 99213 OFFICE O/P EST LOW 20 MIN: CPT | Performed by: FAMILY MEDICINE

## 2022-01-03 PROCEDURE — 80053 COMPREHEN METABOLIC PANEL: CPT

## 2022-01-03 PROCEDURE — 83036 HEMOGLOBIN GLYCOSYLATED A1C: CPT

## 2022-01-03 PROCEDURE — 97110 THERAPEUTIC EXERCISES: CPT | Performed by: PHYSICAL THERAPIST

## 2022-01-03 RX ORDER — METFORMIN HYDROCHLORIDE 750 MG/1
750 TABLET, EXTENDED RELEASE ORAL 2 TIMES DAILY WITH MEALS
Qty: 180 TABLET | Refills: 1 | Status: SHIPPED | OUTPATIENT
Start: 2022-01-03

## 2022-01-03 NOTE — PROGRESS NOTES
Dx: Facet syndrome, lumbar (M47.816)         Authorized # of Visits:  8         Next MD visit: none scheduled  Fall Risk: standard         Precautions: n/a           Medication Changes since last visit?: No     Subjective: doing HEP and sleeping better.  Pa x 4 min                            Assessment: Pt progressing well with improving trunk control, decreasing irritability of symptoms. Goals:   to be reached in 8 visits.   · Pt. will report decreased pain from 8/10 to 2/10 at worst.  · Increase strengt

## 2022-01-03 NOTE — PROGRESS NOTES
Jose Raul Bustamante is a 43year old female. CHIEF COMPLAINT   Follow up on metformin  HPI:   Breast feeding 1year old. History of PCOS - diagnosed about 2008. Saw reproductive endocrine who was managing then referred back her.     Taking metformin ER 75 Alcohol/week: 0.0 standard drinks    Drug use: No       REVIEW OF SYSTEMS:   GENERAL HEALTH: feels well otherwise    EXAM:   BP (P) 112/78 (BP Location: Right arm, Patient Position: Sitting, Cuff Size: large)   Pulse (P) 84   Temp (P) 98.4 °F (36.9 °C) (Or

## 2022-01-05 ENCOUNTER — OFFICE VISIT (OUTPATIENT)
Dept: PHYSICAL THERAPY | Age: 37
End: 2022-01-05
Attending: PHYSICAL MEDICINE & REHABILITATION
Payer: COMMERCIAL

## 2022-01-05 PROCEDURE — 97110 THERAPEUTIC EXERCISES: CPT | Performed by: PHYSICAL THERAPIST

## 2022-01-05 NOTE — PROGRESS NOTES
Dx: Facet syndrome, lumbar (M47.816)         Authorized # of Visits:  8         Next MD visit: none scheduled  Fall Risk: standard         Precautions: n/a           Medication Changes since last visit?: No     Subjective: doing HEP and sleeping well, not Man PKB x 2 min ea Man PKB x 2 min ea Man PKB x 2 min ea Man PKB x 2 min ea      Man rot mobs to Lspine x 3 min Man rot mobs to Lspine x 4 min Man rot mobs to Lspine x 4 min Man rot mobs to Lspine x 4 min                           Assessment: Pt progres

## 2022-01-07 ENCOUNTER — TELEPHONE (OUTPATIENT)
Dept: PHYSICAL MEDICINE AND REHAB | Facility: CLINIC | Age: 37
End: 2022-01-07

## 2022-01-11 ENCOUNTER — OFFICE VISIT (OUTPATIENT)
Dept: PHYSICAL THERAPY | Age: 37
End: 2022-01-11
Attending: PHYSICAL MEDICINE & REHABILITATION
Payer: COMMERCIAL

## 2022-01-11 PROCEDURE — 97110 THERAPEUTIC EXERCISES: CPT | Performed by: PHYSICAL THERAPIST

## 2022-01-11 PROCEDURE — 97140 MANUAL THERAPY 1/> REGIONS: CPT | Performed by: PHYSICAL THERAPIST

## 2022-01-11 NOTE — PROGRESS NOTES
Dx: Facet syndrome, lumbar (M47.816)         Authorized # of Visits:  8         Next MD visit: none scheduled  Fall Risk: standard         Precautions: n/a           Medication Changes since last visit?: No     Subjective: Not taking pain meds anymore.  fee Side plank 2x 10 ea     PKB x 20 ea PKB x 20 ea PKB x 20 ea PKB x 20 ea PKB x 20 ea PKB x 20 ea    Prone hip ext 2x10 ea Prone hip ext 2x10 ea Prone hip ext 2x10 ea Prone hip ext 2x10 ea Prone hip ext 2x10 ea Prone hip ext 2x10 ea     Man PKB x 2 min ea Ma

## 2022-01-18 ENCOUNTER — OFFICE VISIT (OUTPATIENT)
Dept: PHYSICAL THERAPY | Age: 37
End: 2022-01-18
Attending: FAMILY MEDICINE
Payer: COMMERCIAL

## 2022-01-18 PROCEDURE — 97140 MANUAL THERAPY 1/> REGIONS: CPT | Performed by: PHYSICAL THERAPIST

## 2022-01-18 PROCEDURE — 97110 THERAPEUTIC EXERCISES: CPT | Performed by: PHYSICAL THERAPIST

## 2022-01-18 NOTE — PROGRESS NOTES
DISCHARGE NOTE    Dx: Facet syndrome, lumbar (M47.816)         Authorized # of Visits:  8         Next MD visit: none scheduled  Fall Risk: standard         Precautions: n/a           Medication Changes since last visit?: No     Subjective: Feeling much be ab curl 3x10 Supine ab curl 3x10    Side plank x 10 ea Side plank x 10 ea Side plank 2x 10 ea Side plank 2x 10 ea     PKB x 20 ea PKB x 20 ea PKB x 20 ea PKB x 20 ea PKB x 20 ea PKB x 20 ea PKB x 20 ea   Prone hip ext 2x10 ea Prone hip ext 2x10 ea Prone hi

## 2022-01-27 ENCOUNTER — TELEMEDICINE (OUTPATIENT)
Dept: PHYSICAL MEDICINE AND REHAB | Facility: CLINIC | Age: 37
End: 2022-01-27

## 2022-01-27 ENCOUNTER — TELEPHONE (OUTPATIENT)
Dept: NEUROLOGY | Facility: CLINIC | Age: 37
End: 2022-01-27

## 2022-01-27 DIAGNOSIS — M54.9 TRIGGER POINT WITH BACK PAIN: Primary | ICD-10-CM

## 2022-01-27 DIAGNOSIS — F30.9 BIPOLAR I DISORDER, SINGLE MANIC EPISODE (HCC): ICD-10-CM

## 2022-01-27 DIAGNOSIS — K21.9 GASTROESOPHAGEAL REFLUX DISEASE, UNSPECIFIED WHETHER ESOPHAGITIS PRESENT: ICD-10-CM

## 2022-01-27 PROCEDURE — 99214 OFFICE O/P EST MOD 30 MIN: CPT | Performed by: PHYSICAL MEDICINE & REHABILITATION

## 2022-01-27 NOTE — TELEPHONE ENCOUNTER
[employer] for authorization of approval for Left lumbar paraspinals, quadratus lumborum, gluteus desiree trigger point injection cpt code 87038. Authorization is not required. Transaction ID: [id number]. Will  Inform Nursing. Gonzalez Reveal

## 2022-01-27 NOTE — PROGRESS NOTES
[address]    Telemedicine Visit - Follow Up Evaluation    Telehealth Verbal Consent   I conducted a telehealth visit with Rosio Briceno today, 1/27/22, which was completed using two-way, real-time inte lumborum muscle with palpable tenderness and trigger points in this area. She denies any radiating symptoms in the lower extremities, any changes in strength sensation or bowel bladder.   She denies any aching throbbing pain in the joints in the lower back every 8 (eight) hours as needed for Pain. 30 tablet 1   • Tretinoin 0.05 % External Cream Apply to face nightly 45 g 1   • amphetamine-dextroamphetamine 10 MG Oral Tab 5mg bid     • BIOTIN OR Take by mouth.      • NON FORMULARY       • Calcium Carb-Cholecal External appearance identifies normal appearance without obvious deformity  Cardiovascular: No cyanosis, clubbing or edema  Respiratory: Non-labored respirations  Skin: No lesions noted   Neurological: alert & oriented x 3, attentive, able to follow comman pain.  Patient is doing well with physical therapy and home exercises. She has trigger points in the area of her lumbar musculature. I recommended trigger point injection in the office.   She will continue her home exercise plan and topical treatment as t

## 2022-01-28 ENCOUNTER — PATIENT MESSAGE (OUTPATIENT)
Dept: FAMILY MEDICINE CLINIC | Facility: CLINIC | Age: 37
End: 2022-01-28

## 2022-01-29 RX ORDER — SUMATRIPTAN 100 MG/1
TABLET, FILM COATED ORAL
Qty: 9 TABLET | Refills: 1 | Status: SHIPPED | OUTPATIENT
Start: 2022-01-29

## 2022-01-29 NOTE — TELEPHONE ENCOUNTER
From: Rosio Briceno  To: Monika Wang PA-C  Sent: 1/28/2022 6:33 PM CST  Subject: Migraine Medication    Hi Aryan Baker,    I hope this message finds you well. I was wondering if you could send over a refill for Imitrex (Sumatriptan)?  Whatever

## 2022-02-01 ENCOUNTER — OFFICE VISIT (OUTPATIENT)
Dept: PHYSICAL MEDICINE AND REHAB | Facility: CLINIC | Age: 37
End: 2022-02-01
Payer: COMMERCIAL

## 2022-02-01 DIAGNOSIS — M54.9 TRIGGER POINT WITH BACK PAIN: Primary | ICD-10-CM

## 2022-02-01 PROCEDURE — 20553 NJX 1/MLT TRIGGER POINTS 3/>: CPT | Performed by: PHYSICAL MEDICINE & REHABILITATION

## 2022-02-01 RX ORDER — LIDOCAINE HYDROCHLORIDE 10 MG/ML
5 INJECTION, SOLUTION INFILTRATION; PERINEURAL ONCE
Status: COMPLETED | OUTPATIENT
Start: 2022-02-01 | End: 2022-02-01

## 2022-02-01 RX ADMIN — LIDOCAINE HYDROCHLORIDE 5 ML: 10 INJECTION, SOLUTION INFILTRATION; PERINEURAL at 16:38:00

## 2022-02-01 NOTE — PATIENT INSTRUCTIONS
Trigger point Injection Information  What to expect: The injection contains Lidocaine (which numbs the area)   You may have pain relief within hours of the injection due to the Lidocaine. It is also possible to have a slight increase in symptoms over the first few days, but that should resolve fairly quickly. How long will the injection last?: The length of response to an injection is variable. Literally a couple days to a couple weeks. Activity Recommendations: For the first 24 hours after injection, keep the area clean and dry. It is ok to shower but don't soak in a tub during that time. No vigorous activity such as running or heavy lifting for the first week but other than that you can gradually resume your normal activities immediately. If you have a significant decrease in pain, be careful not to do too much too soon. Again, the key is GRADUAL resumption of activites. Things to look out for: Common injection side effects include soreness at the injection site, bruising, flushing of the face or skin. Infection is very rare but please notify my office [facility] for [address] [phone]) if you develop any fevers, drainage from the injection site, or severe increase in pain. If it is the weekend, go to an Emergency Room.         Follow up in four weeks virtually

## 2022-02-02 PROBLEM — M54.9 TRIGGER POINT WITH BACK PAIN: Status: ACTIVE | Noted: 2022-02-02

## 2022-02-03 ENCOUNTER — TELEPHONE (OUTPATIENT)
Dept: PHYSICAL MEDICINE AND REHAB | Facility: CLINIC | Age: 37
End: 2022-02-03

## 2022-02-16 ENCOUNTER — OFFICE VISIT (OUTPATIENT)
Dept: FAMILY MEDICINE CLINIC | Facility: CLINIC | Age: 37
End: 2022-02-16
Payer: COMMERCIAL

## 2022-02-16 ENCOUNTER — TELEPHONE (OUTPATIENT)
Dept: FAMILY MEDICINE CLINIC | Facility: CLINIC | Age: 37
End: 2022-02-16

## 2022-02-16 VITALS
DIASTOLIC BLOOD PRESSURE: 91 MMHG | HEART RATE: 115 BPM | SYSTOLIC BLOOD PRESSURE: 162 MMHG | OXYGEN SATURATION: 98 % | TEMPERATURE: 98 F

## 2022-02-16 DIAGNOSIS — Z20.818 EXPOSURE TO STREP THROAT: ICD-10-CM

## 2022-02-16 DIAGNOSIS — J02.9 SORE THROAT: Primary | ICD-10-CM

## 2022-02-16 LAB
CONTROL LINE PRESENT WITH A CLEAR BACKGROUND (YES/NO): YES YES/NO
KIT LOT #: NORMAL NUMERIC

## 2022-02-16 PROCEDURE — 99213 OFFICE O/P EST LOW 20 MIN: CPT | Performed by: FAMILY MEDICINE

## 2022-02-16 PROCEDURE — 3077F SYST BP >= 140 MM HG: CPT | Performed by: FAMILY MEDICINE

## 2022-02-16 PROCEDURE — 3080F DIAST BP >= 90 MM HG: CPT | Performed by: FAMILY MEDICINE

## 2022-02-16 PROCEDURE — 87880 STREP A ASSAY W/OPTIC: CPT | Performed by: FAMILY MEDICINE

## 2022-02-16 RX ORDER — AMOXICILLIN 875 MG/1
875 TABLET, COATED ORAL 2 TIMES DAILY
Qty: 20 TABLET | Refills: 0 | Status: SHIPPED | OUTPATIENT
Start: 2022-02-16 | End: 2022-02-26

## 2022-02-16 NOTE — TELEPHONE ENCOUNTER
Pr will go to [facility] today with her son. He developed fever overnight.

## 2022-02-16 NOTE — TELEPHONE ENCOUNTER
See below. Are we running strep tests yet? Pt vaxed and boosted. Please advise if she needs WIC (they opened as of today). Thanks.

## 2022-02-16 NOTE — PATIENT INSTRUCTIONS
Hold antibiotics for now. Monitor symptoms. If worsening sore throat or new onset of fever, start antibiotics. Take antibiotics with food and plenty of water. Eat yogurt or take probiotic daily. (Christian Orozco is a good example of an OTC probiotic)  Make sure to finish the entire antibiotic treatment once started. Increase fluids and rest.   Use otc meds as needed for comfort. Monitor symptoms and contact the office if no better in 2-3 days.

## 2022-02-16 NOTE — TELEPHONE ENCOUNTER
If she has a negative at home Covid test, I'm comfortable seeing her but please check with Bharath Shaver RN since I'm not sure we are officially cleared to do strep tests.   Also, I sent Bharath Shaver RN a message to see if we could move patient to 9:00 tomorrow AM to allow for a later start for my patients given the weather forecast.

## 2022-03-01 ENCOUNTER — OFFICE VISIT (OUTPATIENT)
Dept: PHYSICAL MEDICINE AND REHAB | Facility: CLINIC | Age: 37
End: 2022-03-01
Payer: COMMERCIAL

## 2022-03-01 ENCOUNTER — TELEPHONE (OUTPATIENT)
Dept: NEUROLOGY | Facility: CLINIC | Age: 37
End: 2022-03-01

## 2022-03-01 VITALS
HEART RATE: 99 BPM | OXYGEN SATURATION: 94 % | HEIGHT: 61 IN | SYSTOLIC BLOOD PRESSURE: 118 MMHG | BODY MASS INDEX: 35.87 KG/M2 | DIASTOLIC BLOOD PRESSURE: 78 MMHG | WEIGHT: 190 LBS

## 2022-03-01 DIAGNOSIS — M54.50 CHRONIC LEFT-SIDED LOW BACK PAIN WITHOUT SCIATICA: Primary | ICD-10-CM

## 2022-03-01 DIAGNOSIS — K21.9 GASTROESOPHAGEAL REFLUX DISEASE, UNSPECIFIED WHETHER ESOPHAGITIS PRESENT: ICD-10-CM

## 2022-03-01 DIAGNOSIS — F30.9 BIPOLAR I DISORDER, SINGLE MANIC EPISODE (HCC): ICD-10-CM

## 2022-03-01 DIAGNOSIS — M54.9 TRIGGER POINT WITH BACK PAIN: ICD-10-CM

## 2022-03-01 DIAGNOSIS — G89.29 CHRONIC LEFT-SIDED LOW BACK PAIN WITHOUT SCIATICA: Primary | ICD-10-CM

## 2022-03-01 PROCEDURE — 3008F BODY MASS INDEX DOCD: CPT | Performed by: PHYSICAL MEDICINE & REHABILITATION

## 2022-03-01 PROCEDURE — 3074F SYST BP LT 130 MM HG: CPT | Performed by: PHYSICAL MEDICINE & REHABILITATION

## 2022-03-01 PROCEDURE — 99214 OFFICE O/P EST MOD 30 MIN: CPT | Performed by: PHYSICAL MEDICINE & REHABILITATION

## 2022-03-01 PROCEDURE — 3078F DIAST BP <80 MM HG: CPT | Performed by: PHYSICAL MEDICINE & REHABILITATION

## 2022-03-01 NOTE — TELEPHONE ENCOUNTER
[employer] for authorization of approval for  MRI L-spine wo cpt code 30127. Authorization is not required. Transaction ID: [id number]. Pt.  informed.

## 2022-03-01 NOTE — PATIENT INSTRUCTIONS
-MRI of the lumbar spine and follow up after  -Tylenol, ice/heat as needed  -Continue home exercises

## 2022-03-03 ENCOUNTER — TELEPHONE (OUTPATIENT)
Dept: PHYSICAL MEDICINE AND REHAB | Facility: CLINIC | Age: 37
End: 2022-03-03

## 2022-03-10 ENCOUNTER — HOSPITAL ENCOUNTER (OUTPATIENT)
Dept: MRI IMAGING | Age: 37
Discharge: HOME | End: 2022-03-10
Attending: PHYSICAL MEDICINE & REHABILITATION
Payer: COMMERCIAL

## 2022-03-10 DIAGNOSIS — G89.29 CHRONIC LEFT-SIDED LOW BACK PAIN WITHOUT SCIATICA: ICD-10-CM

## 2022-03-10 DIAGNOSIS — M54.50 CHRONIC LEFT-SIDED LOW BACK PAIN WITHOUT SCIATICA: ICD-10-CM

## 2022-03-10 PROCEDURE — 72148 MRI LUMBAR SPINE W/O DYE: CPT | Performed by: PHYSICAL MEDICINE & REHABILITATION

## 2022-03-11 ENCOUNTER — TELEPHONE (OUTPATIENT)
Dept: NEUROLOGY | Facility: CLINIC | Age: 37
End: 2022-03-11

## 2022-03-11 NOTE — TELEPHONE ENCOUNTER
Spoke to patient, relayed information from Dr Isacc Andersen note 3/11/22. NOV is 3/18/22. Patient thankful for call.

## 2022-03-11 NOTE — TELEPHONE ENCOUNTER
----- Message from Chapis Alex DO sent at 3/11/2022  7:50 AM CST -----  MRI shows arthritis in the facet joints.   Please have her follow-up as discussed

## 2022-03-18 ENCOUNTER — TELEPHONE (OUTPATIENT)
Dept: NEUROLOGY | Facility: CLINIC | Age: 37
End: 2022-03-18

## 2022-03-18 ENCOUNTER — TELEMEDICINE (OUTPATIENT)
Dept: PHYSICAL MEDICINE AND REHAB | Facility: CLINIC | Age: 37
End: 2022-03-18

## 2022-03-18 DIAGNOSIS — F30.9 BIPOLAR I DISORDER, SINGLE MANIC EPISODE (HCC): ICD-10-CM

## 2022-03-18 DIAGNOSIS — K21.9 GASTROESOPHAGEAL REFLUX DISEASE, UNSPECIFIED WHETHER ESOPHAGITIS PRESENT: ICD-10-CM

## 2022-03-18 DIAGNOSIS — G89.29 CHRONIC LEFT-SIDED LOW BACK PAIN WITHOUT SCIATICA: ICD-10-CM

## 2022-03-18 DIAGNOSIS — M54.50 CHRONIC LEFT-SIDED LOW BACK PAIN WITHOUT SCIATICA: ICD-10-CM

## 2022-03-18 DIAGNOSIS — M47.819 FACET ARTHROPATHY: Primary | ICD-10-CM

## 2022-03-18 PROCEDURE — 99214 OFFICE O/P EST MOD 30 MIN: CPT | Performed by: PHYSICAL MEDICINE & REHABILITATION

## 2022-03-18 NOTE — TELEPHONE ENCOUNTER
[employer] for authorization of approval for Left L3-4, L4-5, L5-S1 Facet joint injection under fluoroscopy cpt codes E7370297, [id number], [id number], [id number]. Transaction ID: [id number]. Will inform Nursing.

## 2022-03-21 NOTE — TELEPHONE ENCOUNTER
BMI: 35.90kg/m2    Patient has been scheduled for Left L3-4, L4-5, L5-S1 Facet joint injection under fluoroscopy cpt  on 3/28/22 at the [address] with .   -Anesthesia type: Local.  -If receiving MAC or IVC sedation patient will need to get COVID tested 3 days prior even if already vaccinated (order placed by [address]  -If scheduling [facility] and [facility] covid testing required for all procedures whether patient is vaccinated or not. -Patient informed not to eat or drink anything after midnight the night prior to the procedure, if being sedated. -Patient was advised that if he/she does receive the covid vaccine it needs to be at least 2 weeks before or after the injection. -Medications and allergies reviewed. -Patient reminded to hold NSAIDs (Ibuprofen, ASA 81, Aleve, Naproxen, Mobic etc.) for 3 days prior to [place]  if BMI is greater than 35. For Cervical injections only hold multivitamins, Vitamin E, Fish Oil, Phentermine (Lomaira) for 7 days prior to injection and NSAIDS.  mg to be held for 7 days prior to injections.  -If patient is receiving MAC/IVCS Phentermine Darleen Negley) will need to be held for 7 days prior to injection.  -If on blood thinner clearance has been received to hold this medication by provider.   -Patient informed he/she will need a  to and from procedure. -[facility] is located in the [facility] 1st floor. Patient may park in the yellow parking. Patient verbalized understanding and agrees with plan.  -----> Scheduled in Epic: Yes  -----> Scheduled in [employer]:  Yes

## 2022-04-11 ENCOUNTER — OFFICE VISIT (OUTPATIENT)
Dept: SURGERY | Facility: CLINIC | Age: 37
End: 2022-04-11

## 2022-04-11 DIAGNOSIS — M47.816 FACET ARTHROPATHY, LUMBAR: Primary | ICD-10-CM

## 2022-04-11 PROCEDURE — 64493 INJ PARAVERT F JNT L/S 1 LEV: CPT | Performed by: PHYSICAL MEDICINE & REHABILITATION

## 2022-04-11 PROCEDURE — 64494 INJ PARAVERT F JNT L/S 2 LEV: CPT | Performed by: PHYSICAL MEDICINE & REHABILITATION

## 2022-04-11 PROCEDURE — 64495 INJ PARAVERT F JNT L/S 3 LEV: CPT | Performed by: PHYSICAL MEDICINE & REHABILITATION

## 2022-04-11 NOTE — PROCEDURES
[facility] Z-JOINT/FACET INJECTIONS  NAME:  Prakash Soto    MR #:    [id number] :  1985     PHYSICIAN:  Esmer Clark. Christopher Case DO        Operative Report    DATE OF PROCEDURE: 2022   PREOPERATIVE DIAGNOSES: 1. Facet arthropathy, lumbar        POSTOPERATIVE DIAGNOSES:   1. Facet arthropathy, lumbar        PROCEDURES: left L3-4, L4-5 and L5-S1 Z-joint/facet injection done under fluoroscopic guidance with contrast enhancement. SURGEON: Esmer Clark. Christopher Case DO   ANESTHESIA: Local   INDICATIONS:      OPERATIVE PROCEDURE:  Written consent was obtained from the patient. The patient was brought into the operating room and placed in the prone position on the fluoroscopy table with a pillow underneath the abdomen. The patient's skin was cleaned and draped in a normal sterile fashion. Using AP fluoroscopy, all five lumbar vertebrae were identified. Then the left L3-4, L4-5 and L5-S1 z-joints were identified on AP view. At this point in time, the patient's skin was anesthetized with 1 to 2 cc of 1% PF lidocaine without epinephrine over each joint site. Then, 5 inch, 22-gauge spinal needles were inserted and directed towards the left L3-4, L4-5 and L5-S1 z-joints . When it felt to be in good position, right anterior oblique fluoroscopy was used to advance the needle into the correct z-joint. At this point in time, Omnipaque-240 contrast was used to obtain a good athrogram indicating correct needle placement. Then, aspiration was performed. No blood, fluid, or air was aspirated and each joint was injected with a 1 cc solution of 1/2 cc of 40 mg/cc of Kenalog and 1/2 cc of 1% PF lidocaine without epinephrine. After this, the needles were removed. The patient's skin was cleaned. Band-Aids were applied. The patient was transferred to the cart and into [facility]. The patient was given discharge instructions and will follow up in the clinic as scheduled.   Throughout the whole procedure, the patient's pulse oximetry and vital signs were monitored and they remained completely stable. Also, throughout the whole procedure, prior to injection of any medication, aspiration was performed. No blood, fluid, or air was aspirated at anytime.     Alex Cano DO, FAAMERYL & CAQSM  Physical Medicine and Rehabilitation/Sports Medicine  [facility]

## 2022-04-29 ENCOUNTER — TELEMEDICINE (OUTPATIENT)
Dept: PHYSICAL MEDICINE AND REHAB | Facility: CLINIC | Age: 37
End: 2022-04-29

## 2022-04-29 DIAGNOSIS — M54.50 CHRONIC LEFT-SIDED LOW BACK PAIN WITHOUT SCIATICA: ICD-10-CM

## 2022-04-29 DIAGNOSIS — K21.9 GASTROESOPHAGEAL REFLUX DISEASE, UNSPECIFIED WHETHER ESOPHAGITIS PRESENT: ICD-10-CM

## 2022-04-29 DIAGNOSIS — M47.819 FACET ARTHROPATHY: Primary | ICD-10-CM

## 2022-04-29 DIAGNOSIS — F30.9 BIPOLAR I DISORDER, SINGLE MANIC EPISODE (HCC): ICD-10-CM

## 2022-04-29 DIAGNOSIS — G89.29 CHRONIC LEFT-SIDED LOW BACK PAIN WITHOUT SCIATICA: ICD-10-CM

## 2022-04-29 PROCEDURE — 99213 OFFICE O/P EST LOW 20 MIN: CPT | Performed by: PHYSICAL MEDICINE & REHABILITATION

## 2022-06-09 ENCOUNTER — LAB ENCOUNTER (OUTPATIENT)
Dept: LAB | Age: 37
End: 2022-06-09
Attending: FAMILY MEDICINE
Payer: COMMERCIAL

## 2022-06-09 ENCOUNTER — OFFICE VISIT (OUTPATIENT)
Dept: FAMILY MEDICINE CLINIC | Facility: CLINIC | Age: 37
End: 2022-06-09
Payer: COMMERCIAL

## 2022-06-09 VITALS
BODY MASS INDEX: 37 KG/M2 | WEIGHT: 196 LBS | RESPIRATION RATE: 16 BRPM | DIASTOLIC BLOOD PRESSURE: 72 MMHG | HEIGHT: 61 IN | HEART RATE: 84 BPM | SYSTOLIC BLOOD PRESSURE: 122 MMHG | TEMPERATURE: 98 F

## 2022-06-09 DIAGNOSIS — K06.8 GINGIVAL BLEEDING: ICD-10-CM

## 2022-06-09 DIAGNOSIS — E28.2 PCOS (POLYCYSTIC OVARIAN SYNDROME): ICD-10-CM

## 2022-06-09 DIAGNOSIS — Z00.00 LABORATORY EXAMINATION ORDERED AS PART OF A ROUTINE GENERAL MEDICAL EXAMINATION: ICD-10-CM

## 2022-06-09 DIAGNOSIS — N92.1 MENORRHAGIA WITH IRREGULAR CYCLE: ICD-10-CM

## 2022-06-09 DIAGNOSIS — Z00.00 ROUTINE GENERAL MEDICAL EXAMINATION AT A HEALTH CARE FACILITY: Primary | ICD-10-CM

## 2022-06-09 DIAGNOSIS — Z12.31 ENCOUNTER FOR MAMMOGRAM TO ESTABLISH BASELINE MAMMOGRAM: ICD-10-CM

## 2022-06-09 LAB
ALBUMIN SERPL-MCNC: 3.4 G/DL (ref 3.4–5)
ALBUMIN/GLOB SERPL: 1 {RATIO} (ref 1–2)
ALP LIVER SERPL-CCNC: 64 U/L
ALT SERPL-CCNC: 23 U/L
ANION GAP SERPL CALC-SCNC: 4 MMOL/L (ref 0–18)
AST SERPL-CCNC: 16 U/L (ref 15–37)
BASOPHILS # BLD AUTO: 0.04 X10(3) UL (ref 0–0.2)
BASOPHILS NFR BLD AUTO: 0.5 %
BILIRUB SERPL-MCNC: 0.5 MG/DL (ref 0.1–2)
BUN BLD-MCNC: 9 MG/DL (ref 7–18)
CALCIUM BLD-MCNC: 8.7 MG/DL (ref 8.5–10.1)
CHLORIDE SERPL-SCNC: 107 MMOL/L (ref 98–112)
CHOLEST SERPL-MCNC: 128 MG/DL (ref ?–200)
CO2 SERPL-SCNC: 26 MMOL/L (ref 21–32)
CREAT BLD-MCNC: 0.93 MG/DL
EOSINOPHIL # BLD AUTO: 0.1 X10(3) UL (ref 0–0.7)
EOSINOPHIL NFR BLD AUTO: 1.3 %
ERYTHROCYTE [DISTWIDTH] IN BLOOD BY AUTOMATED COUNT: 14.2 %
EST. AVERAGE GLUCOSE BLD GHB EST-MCNC: 105 MG/DL (ref 68–126)
FASTING PATIENT LIPID ANSWER: YES
FASTING STATUS PATIENT QL REPORTED: YES
GLOBULIN PLAS-MCNC: 3.3 G/DL (ref 2.8–4.4)
GLUCOSE BLD-MCNC: 83 MG/DL (ref 70–99)
HBA1C MFR BLD: 5.3 % (ref ?–5.7)
HCT VFR BLD AUTO: 41.3 %
HDLC SERPL-MCNC: 54 MG/DL (ref 40–59)
HGB BLD-MCNC: 13 G/DL
IMM GRANULOCYTES # BLD AUTO: 0.02 X10(3) UL (ref 0–1)
IMM GRANULOCYTES NFR BLD: 0.3 %
LDLC SERPL CALC-MCNC: 54 MG/DL (ref ?–100)
LYMPHOCYTES # BLD AUTO: 2.86 X10(3) UL (ref 1–4)
LYMPHOCYTES NFR BLD AUTO: 37.2 %
MCH RBC QN AUTO: 27.8 PG (ref 26–34)
MCHC RBC AUTO-ENTMCNC: 31.5 G/DL (ref 31–37)
MCV RBC AUTO: 88.2 FL
MONOCYTES # BLD AUTO: 0.56 X10(3) UL (ref 0.1–1)
MONOCYTES NFR BLD AUTO: 7.3 %
NEUTROPHILS # BLD AUTO: 4.11 X10 (3) UL (ref 1.5–7.7)
NEUTROPHILS # BLD AUTO: 4.11 X10(3) UL (ref 1.5–7.7)
NEUTROPHILS NFR BLD AUTO: 53.4 %
NONHDLC SERPL-MCNC: 74 MG/DL (ref ?–130)
OSMOLALITY SERPL CALC.SUM OF ELEC: 282 MOSM/KG (ref 275–295)
PLATELET # BLD AUTO: 258 10(3)UL (ref 150–450)
POTASSIUM SERPL-SCNC: 4 MMOL/L (ref 3.5–5.1)
PROT SERPL-MCNC: 6.7 G/DL (ref 6.4–8.2)
RBC # BLD AUTO: 4.68 X10(6)UL
SODIUM SERPL-SCNC: 137 MMOL/L (ref 136–145)
TRIGL SERPL-MCNC: 113 MG/DL (ref 30–149)
TSI SER-ACNC: 1.84 MIU/ML (ref 0.36–3.74)
VLDLC SERPL CALC-MCNC: 16 MG/DL (ref 0–30)
WBC # BLD AUTO: 7.7 X10(3) UL (ref 4–11)

## 2022-06-09 PROCEDURE — 3008F BODY MASS INDEX DOCD: CPT | Performed by: FAMILY MEDICINE

## 2022-06-09 PROCEDURE — 85025 COMPLETE CBC W/AUTO DIFF WBC: CPT

## 2022-06-09 PROCEDURE — 84443 ASSAY THYROID STIM HORMONE: CPT

## 2022-06-09 PROCEDURE — 85610 PROTHROMBIN TIME: CPT

## 2022-06-09 PROCEDURE — 85730 THROMBOPLASTIN TIME PARTIAL: CPT

## 2022-06-09 PROCEDURE — 80053 COMPREHEN METABOLIC PANEL: CPT

## 2022-06-09 PROCEDURE — 3078F DIAST BP <80 MM HG: CPT | Performed by: FAMILY MEDICINE

## 2022-06-09 PROCEDURE — 3074F SYST BP LT 130 MM HG: CPT | Performed by: FAMILY MEDICINE

## 2022-06-09 PROCEDURE — 83036 HEMOGLOBIN GLYCOSYLATED A1C: CPT

## 2022-06-09 PROCEDURE — 80061 LIPID PANEL: CPT

## 2022-06-09 PROCEDURE — 99395 PREV VISIT EST AGE 18-39: CPT | Performed by: FAMILY MEDICINE

## 2022-06-09 RX ORDER — BUPROPION HYDROCHLORIDE 150 MG/1
150 TABLET ORAL DAILY
COMMUNITY
Start: 2022-04-08

## 2022-06-09 RX ORDER — SALICYLIC ACID 2 %
125 CLEANSER (ML) TOPICAL 2 TIMES DAILY
COMMUNITY

## 2022-06-14 ENCOUNTER — LAB ENCOUNTER (OUTPATIENT)
Dept: LAB | Age: 37
End: 2022-06-14
Attending: FAMILY MEDICINE
Payer: COMMERCIAL

## 2022-06-14 DIAGNOSIS — K06.8 GINGIVAL BLEEDING: ICD-10-CM

## 2022-06-14 LAB
APTT PPP: 26.5 SECONDS (ref 23.3–35.6)
INR BLD: 0.94 (ref 0.8–1.2)
PROTHROMBIN TIME: 12.6 SECONDS (ref 11.6–14.8)

## 2022-06-14 PROCEDURE — 85730 THROMBOPLASTIN TIME PARTIAL: CPT | Performed by: FAMILY MEDICINE

## 2022-06-14 PROCEDURE — 85610 PROTHROMBIN TIME: CPT | Performed by: FAMILY MEDICINE

## 2022-07-08 RX ORDER — METFORMIN HYDROCHLORIDE 750 MG/1
TABLET, EXTENDED RELEASE ORAL
Qty: 180 TABLET | Refills: 1 | Status: SHIPPED | OUTPATIENT
Start: 2022-07-08

## 2022-07-22 ENCOUNTER — TELEPHONE (OUTPATIENT)
Dept: NEUROLOGY | Facility: CLINIC | Age: 37
End: 2022-07-22

## 2022-07-22 ENCOUNTER — TELEMEDICINE (OUTPATIENT)
Dept: PHYSICAL MEDICINE AND REHAB | Facility: CLINIC | Age: 37
End: 2022-07-22

## 2022-07-22 DIAGNOSIS — M54.50 CHRONIC LEFT-SIDED LOW BACK PAIN WITHOUT SCIATICA: ICD-10-CM

## 2022-07-22 DIAGNOSIS — G89.29 CHRONIC LEFT-SIDED LOW BACK PAIN WITHOUT SCIATICA: ICD-10-CM

## 2022-07-22 DIAGNOSIS — M47.816 LUMBAR FACET ARTHROPATHY: Primary | ICD-10-CM

## 2022-07-22 DIAGNOSIS — F30.9 BIPOLAR I DISORDER, SINGLE MANIC EPISODE (HCC): ICD-10-CM

## 2022-07-22 DIAGNOSIS — K21.9 GASTROESOPHAGEAL REFLUX DISEASE, UNSPECIFIED WHETHER ESOPHAGITIS PRESENT: ICD-10-CM

## 2022-07-22 PROCEDURE — 99214 OFFICE O/P EST MOD 30 MIN: CPT | Performed by: PHYSICAL MEDICINE & REHABILITATION

## 2022-07-22 NOTE — TELEPHONE ENCOUNTER
[employer] for authorization of Left L3-4, L4-5, L5-S1 Facet joint injection under fluoroscopy cpt codes 54811-CH, 69048-UX, 79613-CD, 00910. Authorization is not required. Transaction ID: [id number]     Will inform  Nursing.

## 2022-07-25 NOTE — TELEPHONE ENCOUNTER
LMTCB 1st attempt    BMI: 37.03 kg/m2: RESTRICTIONS with injection.      Procedure:  Left L3-4, L4-5, L5-S1 Facet joint injection under fluoroscopy guidance  Anesthesia: Local  Dx: X43.046  Location: [address] [place] [address]  CPT Codes: V9996591, [id number], [id number], 35858

## 2022-07-26 NOTE — TELEPHONE ENCOUNTER
Patient has been scheduled for Left L3-4, L4-5, L5-S1 Facet joint injection under fluoroscopy guidance on 8/8/22 at the [facility] with .   -Anesthesia type: Local.  -If receiving MAC or IVC sedation patient will need to get COVID tested 3 days prior even if already vaccinated (order placed by [facility].)  -If scheduling [facility] covid testing required for all procedures whether patient is vaccinated or not. -Patient informed not to eat or drink anything after midnight the night prior to the procedure, if being sedated. -Patient was advised that if he/she does receive the covid vaccine it needs to be at least 2 weeks before or after the injection. -Medications and allergies reviewed. -Patient reminded to hold NSAIDs (Ibuprofen, ASA 81, Aleve, Naproxen, Mobic etc.) for 3 days prior to [place]  if BMI is greater than 35. For Cervical injections only hold multivitamins, Vitamin E, Fish Oil, Phentermine (Lomaira) for 7 days prior to injection and NSAIDS.  mg to be held for 7 days prior to injections.  -If patient is receiving MAC/IVCS Phentermine Kwaku Rebel) will need to be held for 7 days prior to injection.  -If on blood thinner clearance has been received to hold this medication by provider.   -Patient informed he/she will need a  to and from procedure. -[facility] is located in the [facility] 1st floor. Patient may park in the yellow parking. Patient verbalized understanding and agrees with plan.  -----> Scheduled in Epic: Yes  -----> Scheduled in [employer]:  Yes

## 2022-08-08 ENCOUNTER — OFFICE VISIT (OUTPATIENT)
Dept: SURGERY | Facility: CLINIC | Age: 37
End: 2022-08-08

## 2022-08-08 DIAGNOSIS — M47.816 FACET ARTHROPATHY, LUMBAR: Primary | ICD-10-CM

## 2022-08-08 NOTE — PROCEDURES
[facility] Z-JOINT/FACET INJECTIONS  NAME:  Prakash Soto    MR #:    [id number] :  1985     PHYSICIAN:  Esmer Clark. Christopher Case DO        Operative Report    DATE OF PROCEDURE: 2022   PREOPERATIVE DIAGNOSES: 1. Facet arthropathy, lumbar        POSTOPERATIVE DIAGNOSES:   1. Facet arthropathy, lumbar        PROCEDURES: left L3-4, L4-5 and L5-S1 Z-joint/facet injection done under fluoroscopic guidance with contrast enhancement. SURGEON: Esmer Clark. Christopher Case DO   ANESTHESIA: Local   INDICATIONS:      OPERATIVE PROCEDURE:  Written consent was obtained from the patient. The patient was brought into the operating room and placed in the prone position on the fluoroscopy table with a pillow underneath the abdomen. The patient's skin was cleaned and draped in a normal sterile fashion. Using AP fluoroscopy, all five lumbar vertebrae were identified. Then the left L3-4, L4-5 and L5-S1 z-joints were identified on AP view. At this point in time, the patient's skin was anesthetized with 1 to 2 cc of 1% PF lidocaine without epinephrine over each joint site. Then, 5 inch, 22-gauge spinal needles were inserted and directed towards the left L3-4, L4-5 and L5-S1 z-joints . When it felt to be in good position, right anterior oblique fluoroscopy was used to advance the needle into the correct z-joint. At this point in time, Omnipaque-240 contrast was used to obtain a good athrogram indicating correct needle placement. Then, aspiration was performed. No blood, fluid, or air was aspirated and each joint was injected with a 1 cc solution of 1/2 cc of 40 mg/cc of Kenalog and 1/2 cc of 1% PF lidocaine without epinephrine. After this, the needles were removed. The patient's skin was cleaned. Band-Aids were applied. The patient was transferred to the cart and into [facility]. The patient was given discharge instructions and will follow up in the clinic as scheduled.   Throughout the whole procedure, the patient's pulse oximetry and vital signs were monitored and they remained completely stable. Also, throughout the whole procedure, prior to injection of any medication, aspiration was performed. No blood, fluid, or air was aspirated at anytime.     Soila Piedra DO, FAAMERYL & CAQSM  Physical Medicine and Rehabilitation/Sports Medicine  MEDICAL CENTER [facility]

## 2022-08-24 ENCOUNTER — OFFICE VISIT (OUTPATIENT)
Dept: FAMILY MEDICINE CLINIC | Facility: CLINIC | Age: 37
End: 2022-08-24
Payer: COMMERCIAL

## 2022-08-24 ENCOUNTER — PATIENT MESSAGE (OUTPATIENT)
Dept: FAMILY MEDICINE CLINIC | Facility: CLINIC | Age: 37
End: 2022-08-24

## 2022-08-24 VITALS
HEIGHT: 61 IN | OXYGEN SATURATION: 99 % | SYSTOLIC BLOOD PRESSURE: 135 MMHG | RESPIRATION RATE: 16 BRPM | TEMPERATURE: 98 F | HEART RATE: 91 BPM | BODY MASS INDEX: 36.82 KG/M2 | DIASTOLIC BLOOD PRESSURE: 88 MMHG | WEIGHT: 195 LBS

## 2022-08-24 DIAGNOSIS — J02.9 SORE THROAT: ICD-10-CM

## 2022-08-24 DIAGNOSIS — K91.5 POST-CHOLECYSTECTOMY SYNDROME: ICD-10-CM

## 2022-08-24 DIAGNOSIS — J02.9 ACUTE VIRAL PHARYNGITIS: Primary | ICD-10-CM

## 2022-08-24 LAB
CONTROL LINE PRESENT WITH A CLEAR BACKGROUND (YES/NO): YES YES/NO
KIT LOT #: NORMAL NUMERIC
STREP GRP A CUL-SCR: NEGATIVE

## 2022-08-24 PROCEDURE — 3008F BODY MASS INDEX DOCD: CPT

## 2022-08-24 PROCEDURE — 3079F DIAST BP 80-89 MM HG: CPT

## 2022-08-24 PROCEDURE — 99213 OFFICE O/P EST LOW 20 MIN: CPT

## 2022-08-24 PROCEDURE — 3075F SYST BP GE 130 - 139MM HG: CPT

## 2022-08-24 PROCEDURE — 87880 STREP A ASSAY W/OPTIC: CPT

## 2022-08-24 RX ORDER — CHOLESTYRAMINE LIGHT 4 G/5.7G
1 POWDER, FOR SUSPENSION ORAL 2 TIMES DAILY
Qty: 180 EACH | Refills: 0 | Status: SHIPPED | OUTPATIENT
Start: 2022-08-24

## 2022-08-24 NOTE — TELEPHONE ENCOUNTER
From: Lino Pena  To: Francisco Sparks PA-C  Sent: 8/24/2022 12:20 PM CDT  Subject: Cholestyramine    Campbell Meeks,    Hope this message finds you well. 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 I appear to be in the middle of a flare up, likely due to stress. I was wondering if you could send a refill over to the pharmacy. My pharmacy is current in the records. I don't have any other symptoms, just diarrhea that seems triggered by eating, especially in the morning like before.     ThanksMADONNA

## 2022-08-26 ENCOUNTER — TELEMEDICINE (OUTPATIENT)
Dept: PHYSICAL MEDICINE AND REHAB | Facility: CLINIC | Age: 37
End: 2022-08-26

## 2022-08-26 DIAGNOSIS — F30.9 BIPOLAR I DISORDER, SINGLE MANIC EPISODE (HCC): ICD-10-CM

## 2022-08-26 DIAGNOSIS — M47.816 LUMBAR FACET ARTHROPATHY: Primary | ICD-10-CM

## 2022-08-26 DIAGNOSIS — K21.9 GASTROESOPHAGEAL REFLUX DISEASE, UNSPECIFIED WHETHER ESOPHAGITIS PRESENT: ICD-10-CM

## 2022-08-26 PROCEDURE — 99214 OFFICE O/P EST MOD 30 MIN: CPT | Performed by: PHYSICAL MEDICINE & REHABILITATION

## 2022-08-26 RX ORDER — MELOXICAM 15 MG/1
15 TABLET ORAL DAILY
Qty: 14 TABLET | Refills: 0 | Status: SHIPPED | OUTPATIENT
Start: 2022-08-26 | End: 2022-09-09

## 2022-10-12 RX ORDER — MELOXICAM 15 MG/1
TABLET ORAL
Qty: 14 TABLET | Refills: 0 | OUTPATIENT
Start: 2022-10-12

## 2022-11-03 ENCOUNTER — MED REC SCAN ONLY (OUTPATIENT)
Dept: FAMILY MEDICINE CLINIC | Facility: CLINIC | Age: 37
End: 2022-11-03

## 2022-11-09 ENCOUNTER — TELEPHONE (OUTPATIENT)
Dept: FAMILY MEDICINE CLINIC | Facility: CLINIC | Age: 37
End: 2022-11-09

## 2022-11-09 NOTE — TELEPHONE ENCOUNTER
This isn't a diagnosis that we can make here. Please call genetics at THE [facility] to see if the genetic counselors there would evaluate this patient and if not, where would they advise we send her for testing?

## 2022-11-09 NOTE — TELEPHONE ENCOUNTER
Call to [facility] Genetic Counselor [phone] w [facility] receptionist who transferred me to voice mail for Mike Wells. Left [facility] and sent email w pt's GE # regarding questions below from edwina JAMA. Requested call back to any triage nurse in our ofc and provided our ofc dr line #/phone hours. Will follow up on this 11/11/22 if no call back tomorrow. Gene LPN updated re this info.

## 2022-11-09 NOTE — TELEPHONE ENCOUNTER
Pt calling in regards to her referral back to her PCP for a diagnosis confirmation. Pt states that she was recently seen by her pediatrist and was told that her joints were unstable and that there may be an issue with her collagen, Pt states that she was told that she might have [address]. Pt states that she was told that she needed to be seen by her PCP to confirm the diagnosis. Pt would like a call back from the clinical staff to further discuss the matter.      Please advise

## 2022-11-14 NOTE — TELEPHONE ENCOUNTER
Noted. Please advise patient and have her schedule with one of the recommended specialists at [facility] or [facility].

## 2022-11-15 NOTE — TELEPHONE ENCOUNTER
email sent to glo/kayla genetics counselor w request to call or email me w contact info for [facility] or [facility] to provide to pt to sched appt.

## 2022-11-15 NOTE — TELEPHONE ENCOUNTER
Call to pt's cell reaches identified voice mail. Per hipaa consent, left vmm advising edwina JAMA reviewed her messg from last wk. We are reaching out to an individual at [place] [address] for for more info to answer her questions and will call back as soon as we hear back. If any questions before then, call and speak w any triage nurse in our ofc. Will Hernandez  Provided ofc phone hours/contact number

## 2022-11-16 ENCOUNTER — MED REC SCAN ONLY (OUTPATIENT)
Dept: FAMILY MEDICINE CLINIC | Facility: CLINIC | Age: 37
End: 2022-11-16

## 2022-11-16 NOTE — TELEPHONE ENCOUNTER
From Praful Vuong genetic counselor:    Aristides Skinner can be reached at [address] Damaris Haskins can be reached at [phone]    Call to pt's cell reaches identified voice mail. Per hipaa consent, left vmm req call back to triage me today for information re her call/questions from 11/11/22 .  Provided ofc phone hours/contact number

## 2022-11-17 ENCOUNTER — PATIENT MESSAGE (OUTPATIENT)
Dept: FAMILY MEDICINE CLINIC | Facility: CLINIC | Age: 37
End: 2022-11-17

## 2022-11-17 DIAGNOSIS — M25.20 JOINT LAXITY: Primary | ICD-10-CM

## 2022-11-17 NOTE — TELEPHONE ENCOUNTER
Pt informed of below and she voiced understanding. Requests to send infos below to her via [employer]. Sent.

## 2022-11-29 ENCOUNTER — TELEPHONE (OUTPATIENT)
Dept: PHYSICAL MEDICINE AND REHAB | Facility: CLINIC | Age: 37
End: 2022-11-29

## 2022-11-29 ENCOUNTER — OFFICE VISIT (OUTPATIENT)
Dept: PHYSICAL MEDICINE AND REHAB | Facility: CLINIC | Age: 37
End: 2022-11-29
Payer: COMMERCIAL

## 2022-11-29 VITALS — BODY MASS INDEX: 36 KG/M2 | WEIGHT: 193 LBS | HEART RATE: 110 BPM | OXYGEN SATURATION: 98 %

## 2022-11-29 DIAGNOSIS — M54.16 LEFT LUMBAR RADICULOPATHY: Primary | ICD-10-CM

## 2022-11-29 DIAGNOSIS — R29.898 LEFT LEG WEAKNESS: ICD-10-CM

## 2022-11-29 PROCEDURE — 99214 OFFICE O/P EST MOD 30 MIN: CPT | Performed by: PHYSICAL MEDICINE & REHABILITATION

## 2022-11-29 RX ORDER — METHYLPREDNISOLONE 4 MG/1
TABLET ORAL
Qty: 1 EACH | Refills: 0 | Status: SHIPPED | OUTPATIENT
Start: 2022-11-29

## 2022-11-29 NOTE — TELEPHONE ENCOUNTER
2ND ENCOUNTER ON PATIENT SAME DAY. Patient states she would like MRI LUMBAR STAT, nothing in the note stating this MRI should be STAT. Will confirm with Dr. Tyler Win.

## 2022-11-30 ENCOUNTER — HOSPITAL ENCOUNTER (EMERGENCY)
Facility: HOSPITAL | Age: 37
Discharge: HOME | End: 2022-11-30
Attending: EMERGENCY MEDICINE
Payer: COMMERCIAL

## 2022-11-30 ENCOUNTER — TELEPHONE (OUTPATIENT)
Dept: ORTHOPEDICS CLINIC | Facility: CLINIC | Age: 37
End: 2022-11-30

## 2022-11-30 ENCOUNTER — APPOINTMENT (OUTPATIENT)
Dept: MRI IMAGING | Facility: HOSPITAL | Age: 37
End: 2022-11-30
Attending: EMERGENCY MEDICINE
Payer: COMMERCIAL

## 2022-11-30 ENCOUNTER — TELEPHONE (OUTPATIENT)
Dept: FAMILY MEDICINE CLINIC | Facility: CLINIC | Age: 37
End: 2022-11-30

## 2022-11-30 ENCOUNTER — TELEPHONE (OUTPATIENT)
Dept: NEUROLOGY | Facility: CLINIC | Age: 37
End: 2022-11-30

## 2022-11-30 VITALS
DIASTOLIC BLOOD PRESSURE: 87 MMHG | TEMPERATURE: 98 F | RESPIRATION RATE: 20 BRPM | HEIGHT: 61 IN | BODY MASS INDEX: 36.44 KG/M2 | WEIGHT: 193 LBS | SYSTOLIC BLOOD PRESSURE: 123 MMHG | HEART RATE: 94 BPM | OXYGEN SATURATION: 100 %

## 2022-11-30 DIAGNOSIS — M51.16 LUMBAR DISC DISEASE WITH RADICULOPATHY: Primary | ICD-10-CM

## 2022-11-30 LAB — B-HCG UR QL: NEGATIVE

## 2022-11-30 PROCEDURE — 99284 EMERGENCY DEPT VISIT MOD MDM: CPT | Performed by: EMERGENCY MEDICINE

## 2022-11-30 PROCEDURE — 96374 THER/PROPH/DIAG INJ IV PUSH: CPT | Performed by: EMERGENCY MEDICINE

## 2022-11-30 PROCEDURE — 81025 URINE PREGNANCY TEST: CPT

## 2022-11-30 PROCEDURE — 72148 MRI LUMBAR SPINE W/O DYE: CPT | Performed by: EMERGENCY MEDICINE

## 2022-11-30 RX ORDER — KETOROLAC TROMETHAMINE 30 MG/ML
30 INJECTION, SOLUTION INTRAMUSCULAR; INTRAVENOUS ONCE
Status: COMPLETED | OUTPATIENT
Start: 2022-11-30 | End: 2022-11-30

## 2022-11-30 RX ORDER — TRAMADOL HYDROCHLORIDE 50 MG/1
50 TABLET ORAL EVERY 6 HOURS PRN
Qty: 12 TABLET | Refills: 0 | Status: SHIPPED | OUTPATIENT
Start: 2022-11-30

## 2022-11-30 NOTE — TELEPHONE ENCOUNTER
Pt seeing physiatrist at [facility] for ongoing back issues. Pt was advised that she \"very likely has a herniated disk\" and was given a list of symptoms to look out for. Pt states she was advised to got to ER if she developed any  Symptoms on list.    Pt indicates she woke up this AM with some symptoms and believes she will need to proceed to ER and it was advised that if this happens she will possibly need emergency spine surgery. Pt looking for advise from [facility] / Dr Wilma Alcazar if [facility] ER or [facility] is recommended? Pt states she would prefer to go somewhere that her PCP recommends. Pt emotional on phone, transferred live to triage to determine if needing to proceed to ER.

## 2022-11-30 NOTE — TELEPHONE ENCOUNTER
LOV: 11/29/2022 w/ Dr. Maria Elena Galvez. Medrol dosepak prescribed and started yesterday per patient report. Pt stating her pain is okay today, but it's the new symptom that has her concerned (below):  States that she woke up this AM w/o urge/feeling to urinate. Denies any incontinent episodes. Per patient advised by Dr. Maria Elena Galvez if she experienced any mood changes, changes in sensation, to go the ER. This RN advised patient that d/t this being a change in her baseline - to see urgent care and if she is cleared by emergency services, to follow-up with our office. Patient verbalized understanding. Will update Dr. Maria Elena Galvez regarding patient condition update at this time.

## 2022-11-30 NOTE — TELEPHONE ENCOUNTER
Patient spouse calling to inform that Pt is being admitted to the  ER and will like to speak to Mike Champion or his nurse ASAP

## 2022-11-30 NOTE — TELEPHONE ENCOUNTER
Spoke w/ ED provider on the phone. Patient is a 42year old female w/ back pain presenting to the ED w/ possible incontinence. Patient voided in the ED without difficulty. On exam, motor/sensation intact w/ slight thigh numbness. Normal rectal tone. No evidence of high grade nerve compression on MRI to explain her history. Patient can follow up Ortho Spine clinic in 1 week.

## 2022-11-30 NOTE — DISCHARGE INSTRUCTIONS
REST AT [facility] TO THE ED IF SYMPTOMS WORSEN OR IF ANY OTHER PROBLEMS ARISE
English

## 2022-12-01 ENCOUNTER — TELEMEDICINE (OUTPATIENT)
Dept: PHYSICAL MEDICINE AND REHAB | Facility: CLINIC | Age: 37
End: 2022-12-01
Payer: COMMERCIAL

## 2022-12-01 ENCOUNTER — TELEPHONE (OUTPATIENT)
Dept: ORTHOPEDICS CLINIC | Facility: CLINIC | Age: 37
End: 2022-12-01

## 2022-12-01 DIAGNOSIS — M54.16 LEFT LUMBAR RADICULOPATHY: Primary | ICD-10-CM

## 2022-12-01 RX ORDER — MELOXICAM 15 MG/1
15 TABLET ORAL DAILY
Qty: 14 TABLET | Refills: 0 | Status: SHIPPED | OUTPATIENT
Start: 2022-12-01 | End: 2022-12-15

## 2022-12-01 NOTE — PATIENT INSTRUCTIONS
-Start PT and home exercises  -Medrol dose pack to be completed  -Mobic daily after steroid pack  -Follow up in 4 weeks

## 2022-12-01 NOTE — TELEPHONE ENCOUNTER
Imaging was completed for this patient for a Lumbar Spine, but it needs to be reviewed to see if additional imaging is needed. If so, please enter the appropriate RX and let the patient know to come in before their appointment to complete the additional imaging. Thank you!     Future Appointments   Date Time Provider Shanice Mosley   12/2/2022  9:00 AM Cecilia Rae DO [facility]   12/5/2022  2:30 PM Vance Cerda MD [facility]

## 2022-12-01 NOTE — CM/SW NOTE
Received a call from the pt asking clarification of the rx- pt looking for a muscle relaxant and a NSAID stronger than ibuprofen medications on the dc papers. EDCM spoke to Angi Goldstein and clarified the medications. Pt Angi Goldstein, pt stated to him that the muscle relaxant did not worked for her before and that pt also has some stomach/ulcer issues and that tramadol worked for before.  explained that pt need to continue the Medrol dose pk and take Tramadol as needed. Medication clarification explained in detailed with the pt on the phone and pt voiced understanding now.

## 2022-12-02 ENCOUNTER — TELEPHONE (OUTPATIENT)
Dept: PHYSICAL MEDICINE AND REHAB | Facility: CLINIC | Age: 37
End: 2022-12-02

## 2022-12-02 DIAGNOSIS — M54.16 LEFT LUMBAR RADICULOPATHY: Primary | ICD-10-CM

## 2022-12-02 NOTE — TELEPHONE ENCOUNTER
Pt called and asked if we can fax over the PT order, I explained I did not see an order. Please call patient and explain her next steps, she keeps calling PT to make her appt.

## 2022-12-05 ENCOUNTER — OFFICE VISIT (OUTPATIENT)
Dept: ORTHOPEDICS CLINIC | Facility: CLINIC | Age: 37
End: 2022-12-05
Payer: COMMERCIAL

## 2022-12-05 ENCOUNTER — TELEPHONE (OUTPATIENT)
Dept: PHYSICAL THERAPY | Facility: HOSPITAL | Age: 37
End: 2022-12-05

## 2022-12-05 VITALS — HEIGHT: 61 IN | WEIGHT: 192 LBS | BODY MASS INDEX: 36.25 KG/M2

## 2022-12-05 DIAGNOSIS — M54.50 CHRONIC LEFT-SIDED LOW BACK PAIN WITHOUT SCIATICA: Primary | ICD-10-CM

## 2022-12-05 DIAGNOSIS — G89.29 CHRONIC LEFT-SIDED LOW BACK PAIN WITHOUT SCIATICA: Primary | ICD-10-CM

## 2022-12-05 PROCEDURE — 3008F BODY MASS INDEX DOCD: CPT | Performed by: STUDENT IN AN ORGANIZED HEALTH CARE EDUCATION/TRAINING PROGRAM

## 2022-12-05 PROCEDURE — 99204 OFFICE O/P NEW MOD 45 MIN: CPT | Performed by: STUDENT IN AN ORGANIZED HEALTH CARE EDUCATION/TRAINING PROGRAM

## 2022-12-06 ENCOUNTER — PATIENT MESSAGE (OUTPATIENT)
Dept: PHYSICAL MEDICINE AND REHAB | Facility: CLINIC | Age: 37
End: 2022-12-06

## 2022-12-06 NOTE — TELEPHONE ENCOUNTER
From: Laura Hayes  To: Josee Salinas. Jose Perry DO  Sent: 12/6/2022 1:43 PM CST  Subject: Medication request    Hi,    I have a herniated disc L4-L5. I did get the order for PT, however, they cannot start me until Octavio. 3--almost an entire month away. In the meantime, I was wondering if it would be possible to get a script for Cyclobenzaprine. I did get the 14-day fill for Meloxicam.    However, when that runs out, I really would rather have another option besides Tramadol. I don't like how groggy it makes me and then I'm all foggy during the day. I would rather be able to do light exercise, as tolerated, while waiting to get seen by the DPT. I don't think laying around all day because I'm too drowsy from the Tramadol would help my recovery. And yes, I know I can absolutely not take them together. I promise up and down I will not. I don't WANT to take the Tramadol. If it's contraindicated with the Meloxicam, I will promise to not take those together, either.     Thanks again,    [employer]

## 2022-12-06 NOTE — TELEPHONE ENCOUNTER
12/06 LM for Larissa He from [facility] referrals to check on referral [de-identified]. Referral is still open and was placed on 11/21/2022.

## 2022-12-14 NOTE — TELEPHONE ENCOUNTER
Spoke with patient who stated she is scheduled to start PT on 1/3/23. Patient stated the Meloxicam has been helping, but rx was only for 14 days. Patient wanting to know if she can continue the Meloxicam until she starts PT or if there are any other recommendations. Message forwarded to  to advise.

## 2022-12-15 RX ORDER — MELOXICAM 15 MG/1
15 TABLET ORAL DAILY
Qty: 14 TABLET | Refills: 0 | Status: SHIPPED | OUTPATIENT
Start: 2022-12-15 | End: 2022-12-29

## 2022-12-15 NOTE — TELEPHONE ENCOUNTER
Mobic 15mg ordered. Please advise her to continue this medication since it seems to be helping.  Thanks    Dr. Odell Talley

## 2022-12-19 RX ORDER — METFORMIN HYDROCHLORIDE 750 MG/1
TABLET, EXTENDED RELEASE ORAL
Qty: 180 TABLET | Refills: 0 | Status: SHIPPED | OUTPATIENT
Start: 2022-12-19

## 2022-12-20 NOTE — TELEPHONE ENCOUNTER
Referral for Genetics faxed to the attention of Luis at [phone]. [facility] sent to pt to notify her it was auhtorized and sent.

## 2022-12-30 ENCOUNTER — MED REC SCAN ONLY (OUTPATIENT)
Dept: FAMILY MEDICINE CLINIC | Facility: CLINIC | Age: 37
End: 2022-12-30

## 2023-01-03 ENCOUNTER — TELEPHONE (OUTPATIENT)
Dept: PHYSICAL THERAPY | Facility: HOSPITAL | Age: 38
End: 2023-01-03

## 2023-01-03 ENCOUNTER — OFFICE VISIT (OUTPATIENT)
Dept: PHYSICAL MEDICINE AND REHAB | Facility: CLINIC | Age: 38
End: 2023-01-03
Payer: COMMERCIAL

## 2023-01-03 ENCOUNTER — TELEPHONE (OUTPATIENT)
Dept: NEUROLOGY | Facility: CLINIC | Age: 38
End: 2023-01-03

## 2023-01-03 ENCOUNTER — APPOINTMENT (OUTPATIENT)
Dept: PHYSICAL THERAPY | Age: 38
End: 2023-01-03
Attending: PHYSICAL MEDICINE & REHABILITATION
Payer: COMMERCIAL

## 2023-01-03 VITALS
OXYGEN SATURATION: 96 % | WEIGHT: 193 LBS | SYSTOLIC BLOOD PRESSURE: 122 MMHG | DIASTOLIC BLOOD PRESSURE: 86 MMHG | HEART RATE: 95 BPM | BODY MASS INDEX: 36.44 KG/M2 | HEIGHT: 61 IN

## 2023-01-03 DIAGNOSIS — M79.18 CERVICAL MYOFASCIAL PAIN SYNDROME: ICD-10-CM

## 2023-01-03 DIAGNOSIS — G56.23 CUBITAL TUNNEL SYNDROME, BILATERAL: Primary | ICD-10-CM

## 2023-01-03 PROCEDURE — 99214 OFFICE O/P EST MOD 30 MIN: CPT | Performed by: PHYSICAL MEDICINE & REHABILITATION

## 2023-01-03 PROCEDURE — 3074F SYST BP LT 130 MM HG: CPT | Performed by: PHYSICAL MEDICINE & REHABILITATION

## 2023-01-03 PROCEDURE — 3008F BODY MASS INDEX DOCD: CPT | Performed by: PHYSICAL MEDICINE & REHABILITATION

## 2023-01-03 PROCEDURE — 3079F DIAST BP 80-89 MM HG: CPT | Performed by: PHYSICAL MEDICINE & REHABILITATION

## 2023-01-03 RX ORDER — GABAPENTIN 300 MG/1
CAPSULE ORAL
Qty: 90 CAPSULE | Refills: 0 | Status: SHIPPED | OUTPATIENT
Start: 2023-01-03

## 2023-01-03 RX ORDER — MELOXICAM 15 MG/1
15 TABLET ORAL DAILY
Qty: 14 TABLET | Refills: 0 | Status: SHIPPED | OUTPATIENT
Start: 2023-01-03 | End: 2023-01-17

## 2023-01-03 RX ORDER — DAPSONE 75 MG/G
GEL TOPICAL
COMMUNITY

## 2023-01-03 NOTE — TELEPHONE ENCOUNTER
Patient requesting a transfer of care from  to  as she feels like Blake Mendez is feeling frustrated with her case, she states she has seen him for 1 yr. With no progress at all and she made her feel like at this point she is just old and needs to deal with her pain she is only 42 yrs old and is looking for a 2nd opinion at this point.

## 2023-01-03 NOTE — PATIENT INSTRUCTIONS
-Start physical therapy, occupational and home exercises  -Mobic for 7-10 days additional  -Gabapentin 300mg three times daily  -Follow up in 4 weeks

## 2023-01-10 ENCOUNTER — OFFICE VISIT (OUTPATIENT)
Dept: PHYSICAL THERAPY | Age: 38
End: 2023-01-10
Attending: PHYSICAL MEDICINE & REHABILITATION
Payer: COMMERCIAL

## 2023-01-10 PROCEDURE — 97161 PT EVAL LOW COMPLEX 20 MIN: CPT | Performed by: PHYSICAL THERAPIST

## 2023-01-10 PROCEDURE — 97110 THERAPEUTIC EXERCISES: CPT | Performed by: PHYSICAL THERAPIST

## 2023-01-16 ENCOUNTER — OFFICE VISIT (OUTPATIENT)
Dept: PHYSICAL THERAPY | Age: 38
End: 2023-01-16
Attending: PHYSICAL MEDICINE & REHABILITATION
Payer: COMMERCIAL

## 2023-01-16 PROCEDURE — 97140 MANUAL THERAPY 1/> REGIONS: CPT | Performed by: PHYSICAL THERAPIST

## 2023-01-16 PROCEDURE — 97110 THERAPEUTIC EXERCISES: CPT | Performed by: PHYSICAL THERAPIST

## 2023-01-17 ENCOUNTER — OFFICE VISIT (OUTPATIENT)
Dept: FAMILY MEDICINE CLINIC | Facility: CLINIC | Age: 38
End: 2023-01-17
Payer: COMMERCIAL

## 2023-01-17 VITALS
SYSTOLIC BLOOD PRESSURE: 110 MMHG | HEART RATE: 80 BPM | WEIGHT: 197.63 LBS | RESPIRATION RATE: 20 BRPM | BODY MASS INDEX: 37.31 KG/M2 | DIASTOLIC BLOOD PRESSURE: 76 MMHG | HEIGHT: 61 IN | TEMPERATURE: 98 F

## 2023-01-17 DIAGNOSIS — Z83.49 FAMILY HISTORY OF EARLY MENOPAUSE: ICD-10-CM

## 2023-01-17 DIAGNOSIS — E28.2 PCOS (POLYCYSTIC OVARIAN SYNDROME): Primary | ICD-10-CM

## 2023-01-17 DIAGNOSIS — R53.83 FATIGUE, UNSPECIFIED TYPE: ICD-10-CM

## 2023-01-17 PROCEDURE — 99214 OFFICE O/P EST MOD 30 MIN: CPT | Performed by: FAMILY MEDICINE

## 2023-01-17 PROCEDURE — 3078F DIAST BP <80 MM HG: CPT | Performed by: FAMILY MEDICINE

## 2023-01-17 PROCEDURE — 3008F BODY MASS INDEX DOCD: CPT | Performed by: FAMILY MEDICINE

## 2023-01-17 PROCEDURE — 3074F SYST BP LT 130 MM HG: CPT | Performed by: FAMILY MEDICINE

## 2023-01-17 RX ORDER — KETOCONAZOLE 20 MG/G
1 CREAM TOPICAL 2 TIMES DAILY PRN
COMMUNITY
Start: 2022-12-09

## 2023-01-19 ENCOUNTER — PATIENT MESSAGE (OUTPATIENT)
Dept: FAMILY MEDICINE CLINIC | Facility: CLINIC | Age: 38
End: 2023-01-19

## 2023-01-19 ENCOUNTER — LAB ENCOUNTER (OUTPATIENT)
Dept: LAB | Age: 38
End: 2023-01-19
Attending: FAMILY MEDICINE
Payer: COMMERCIAL

## 2023-01-19 DIAGNOSIS — Z83.49 FAMILY HISTORY OF EARLY MENOPAUSE: ICD-10-CM

## 2023-01-19 DIAGNOSIS — R53.83 FATIGUE, UNSPECIFIED TYPE: ICD-10-CM

## 2023-01-19 DIAGNOSIS — E28.2 PCOS (POLYCYSTIC OVARIAN SYNDROME): ICD-10-CM

## 2023-01-19 LAB
ANION GAP SERPL CALC-SCNC: 7 MMOL/L (ref 0–18)
BUN BLD-MCNC: 10 MG/DL (ref 7–18)
BUN/CREAT SERPL: 10.3 (ref 10–20)
CALCIUM BLD-MCNC: 8.9 MG/DL (ref 8.5–10.1)
CHLORIDE SERPL-SCNC: 107 MMOL/L (ref 98–112)
CO2 SERPL-SCNC: 25 MMOL/L (ref 21–32)
CORTIS SERPL-MCNC: 15.2 UG/DL
CREAT BLD-MCNC: 0.97 MG/DL
EST. AVERAGE GLUCOSE BLD GHB EST-MCNC: 108 MG/DL (ref 68–126)
ESTRADIOL SERPL-MCNC: 97.5 PG/ML
FASTING STATUS PATIENT QL REPORTED: YES
FSH SERPL-ACNC: 5.4 MIU/ML
GFR SERPLBLD BASED ON 1.73 SQ M-ARVRAT: 76 ML/MIN/1.73M2 (ref 60–?)
GLUCOSE BLD-MCNC: 87 MG/DL (ref 70–99)
HBA1C MFR BLD: 5.4 % (ref ?–5.7)
LH SERPL-ACNC: 6.7 MIU/ML
OSMOLALITY SERPL CALC.SUM OF ELEC: 286 MOSM/KG (ref 275–295)
POTASSIUM SERPL-SCNC: 4.3 MMOL/L (ref 3.5–5.1)
SODIUM SERPL-SCNC: 139 MMOL/L (ref 136–145)

## 2023-01-19 PROCEDURE — 82670 ASSAY OF TOTAL ESTRADIOL: CPT | Performed by: FAMILY MEDICINE

## 2023-01-19 PROCEDURE — 83002 ASSAY OF GONADOTROPIN (LH): CPT | Performed by: FAMILY MEDICINE

## 2023-01-19 PROCEDURE — 83001 ASSAY OF GONADOTROPIN (FSH): CPT | Performed by: FAMILY MEDICINE

## 2023-01-19 PROCEDURE — 82533 TOTAL CORTISOL: CPT | Performed by: FAMILY MEDICINE

## 2023-01-19 PROCEDURE — 80048 BASIC METABOLIC PNL TOTAL CA: CPT | Performed by: FAMILY MEDICINE

## 2023-01-19 PROCEDURE — 83036 HEMOGLOBIN GLYCOSYLATED A1C: CPT | Performed by: FAMILY MEDICINE

## 2023-01-19 NOTE — TELEPHONE ENCOUNTER
From: George Dias  To: Asif Pugh PA-C  Sent: 1/19/2023 1:49 PM CST  Subject: [facility] Test Results    Saw your note. Woohoo! It's only depression and not Jefferson's (:    Lipids, AST, and ALT, attached from [facility] ([facility]). Will send CBC separate.     All labs normal.

## 2023-01-19 NOTE — TELEPHONE ENCOUNTER
Reviewed. Triglycerides are slightly elevated. Work on lower carbohydrate diet and exercise. Other labs stable.

## 2023-01-20 ENCOUNTER — MED REC SCAN ONLY (OUTPATIENT)
Dept: FAMILY MEDICINE CLINIC | Facility: CLINIC | Age: 38
End: 2023-01-20

## 2023-01-23 ENCOUNTER — MED REC SCAN ONLY (OUTPATIENT)
Dept: PHYSICAL MEDICINE AND REHAB | Facility: CLINIC | Age: 38
End: 2023-01-23

## 2023-01-24 ENCOUNTER — OFFICE VISIT (OUTPATIENT)
Dept: PHYSICAL THERAPY | Age: 38
End: 2023-01-24
Attending: PHYSICAL MEDICINE & REHABILITATION
Payer: COMMERCIAL

## 2023-01-24 PROCEDURE — 97140 MANUAL THERAPY 1/> REGIONS: CPT | Performed by: PHYSICAL THERAPIST

## 2023-01-24 PROCEDURE — 97110 THERAPEUTIC EXERCISES: CPT | Performed by: PHYSICAL THERAPIST

## 2023-01-26 ENCOUNTER — PATIENT MESSAGE (OUTPATIENT)
Dept: FAMILY MEDICINE CLINIC | Facility: CLINIC | Age: 38
End: 2023-01-26

## 2023-01-27 NOTE — TELEPHONE ENCOUNTER
From: Jose Raul Bustamante  To: Serge Castleman, PA-C  Sent: 1/26/2023 9:52 PM CST  Subject: Weight Loss medication    Campbell Paredes,    I forgot to ask you, I have a relative who recently started Olempic. Do you think Wegovy or another semaglutide would be a good option for me? I understand if I need to make another appointment to discuss or if this would be referred out to another provider. Thanks so much!   Carleton Merlin

## 2023-01-29 PROBLEM — G47.00 INSOMNIA: Status: ACTIVE | Noted: 2023-01-29

## 2023-01-29 PROBLEM — G89.29 CHRONIC LEFT-SIDED LOW BACK PAIN WITHOUT SCIATICA: Status: ACTIVE | Noted: 2023-01-29

## 2023-01-29 PROBLEM — M54.50 CHRONIC LEFT-SIDED LOW BACK PAIN WITHOUT SCIATICA: Status: ACTIVE | Noted: 2023-01-29

## 2023-01-29 PROBLEM — M54.2 CERVICALGIA: Status: ACTIVE | Noted: 2021-07-16

## 2023-01-31 ENCOUNTER — OFFICE VISIT (OUTPATIENT)
Dept: PHYSICAL THERAPY | Age: 38
End: 2023-01-31
Attending: PHYSICAL MEDICINE & REHABILITATION
Payer: COMMERCIAL

## 2023-01-31 PROCEDURE — 97140 MANUAL THERAPY 1/> REGIONS: CPT | Performed by: PHYSICAL THERAPIST

## 2023-01-31 PROCEDURE — 97110 THERAPEUTIC EXERCISES: CPT | Performed by: PHYSICAL THERAPIST

## 2023-02-07 ENCOUNTER — APPOINTMENT (OUTPATIENT)
Dept: PHYSICAL THERAPY | Age: 38
End: 2023-02-07
Attending: PHYSICAL MEDICINE & REHABILITATION
Payer: COMMERCIAL

## 2023-02-07 ENCOUNTER — TELEPHONE (OUTPATIENT)
Dept: PHYSICAL THERAPY | Facility: HOSPITAL | Age: 38
End: 2023-02-07

## 2023-02-07 ENCOUNTER — MED REC SCAN ONLY (OUTPATIENT)
Dept: FAMILY MEDICINE CLINIC | Facility: CLINIC | Age: 38
End: 2023-02-07

## 2023-02-14 ENCOUNTER — OFFICE VISIT (OUTPATIENT)
Dept: PHYSICAL THERAPY | Age: 38
End: 2023-02-14
Attending: PHYSICAL MEDICINE & REHABILITATION
Payer: COMMERCIAL

## 2023-02-14 PROCEDURE — 97110 THERAPEUTIC EXERCISES: CPT | Performed by: PHYSICAL THERAPIST

## 2023-02-14 PROCEDURE — 97140 MANUAL THERAPY 1/> REGIONS: CPT | Performed by: PHYSICAL THERAPIST

## 2023-02-20 ENCOUNTER — TELEPHONE (OUTPATIENT)
Dept: PHYSICAL THERAPY | Facility: HOSPITAL | Age: 38
End: 2023-02-20

## 2023-02-20 ENCOUNTER — APPOINTMENT (OUTPATIENT)
Dept: PHYSICAL THERAPY | Age: 38
End: 2023-02-20
Attending: PHYSICAL MEDICINE & REHABILITATION
Payer: COMMERCIAL

## 2023-02-27 ENCOUNTER — APPOINTMENT (OUTPATIENT)
Dept: GENERAL RADIOLOGY | Age: 38
End: 2023-02-27
Attending: PHYSICIAN ASSISTANT
Payer: COMMERCIAL

## 2023-02-27 ENCOUNTER — TELEPHONE (OUTPATIENT)
Dept: PHYSICAL THERAPY | Facility: HOSPITAL | Age: 38
End: 2023-02-27

## 2023-02-27 ENCOUNTER — HOSPITAL ENCOUNTER (OUTPATIENT)
Age: 38
Discharge: HOME | End: 2023-02-27
Payer: COMMERCIAL

## 2023-02-27 VITALS
DIASTOLIC BLOOD PRESSURE: 98 MMHG | WEIGHT: 195 LBS | HEART RATE: 93 BPM | BODY MASS INDEX: 37 KG/M2 | RESPIRATION RATE: 20 BRPM | OXYGEN SATURATION: 99 % | TEMPERATURE: 97 F | SYSTOLIC BLOOD PRESSURE: 148 MMHG

## 2023-02-27 DIAGNOSIS — J98.01 ACUTE BRONCHOSPASM: ICD-10-CM

## 2023-02-27 DIAGNOSIS — B34.9 VIRAL SYNDROME: Primary | ICD-10-CM

## 2023-02-27 DIAGNOSIS — R03.0 ELEVATED BLOOD PRESSURE READING: ICD-10-CM

## 2023-02-27 LAB — SARS-COV-2 RNA RESP QL NAA+PROBE: NOT DETECTED

## 2023-02-27 PROCEDURE — 71046 X-RAY EXAM CHEST 2 VIEWS: CPT | Performed by: PHYSICIAN ASSISTANT

## 2023-02-27 PROCEDURE — 99214 OFFICE O/P EST MOD 30 MIN: CPT

## 2023-02-27 PROCEDURE — 94640 AIRWAY INHALATION TREATMENT: CPT

## 2023-02-27 RX ORDER — PREDNISONE 20 MG/1
40 TABLET ORAL DAILY
Qty: 8 TABLET | Refills: 0 | Status: SHIPPED | OUTPATIENT
Start: 2023-02-27 | End: 2023-03-03

## 2023-02-27 RX ORDER — IPRATROPIUM BROMIDE AND ALBUTEROL SULFATE 2.5; .5 MG/3ML; MG/3ML
3 SOLUTION RESPIRATORY (INHALATION) ONCE
Status: COMPLETED | OUTPATIENT
Start: 2023-02-27 | End: 2023-02-27

## 2023-02-27 RX ORDER — PREDNISONE 20 MG/1
60 TABLET ORAL ONCE
Status: COMPLETED | OUTPATIENT
Start: 2023-02-27 | End: 2023-02-27

## 2023-02-27 NOTE — DISCHARGE INSTRUCTIONS
Start prednisone tomorrow and take for 4 days    If fevers chills worsening symptoms be reevaluated continue on your asthma medications inhalers and nebulizer every 4-6 hours    Monitor your blood pressure, if it continues to be elevated you should be reevaluated by your primary care physician.   Decongestant medication such as Sudafed can elevate your blood pressure

## 2023-02-27 NOTE — ED INITIAL ASSESSMENT (HPI)
Pt with cough/congestion x 1 month - asthma worsening x 1 week, peak flow dropping to 240 - using alb inhaler at night when jae is worse    Fever to 102f last week for 3 days - resolved

## 2023-02-28 ENCOUNTER — APPOINTMENT (OUTPATIENT)
Dept: PHYSICAL THERAPY | Age: 38
End: 2023-02-28
Attending: PHYSICAL MEDICINE & REHABILITATION
Payer: COMMERCIAL

## 2023-03-20 DIAGNOSIS — E28.2 PCOS (POLYCYSTIC OVARIAN SYNDROME): Primary | ICD-10-CM

## 2023-03-20 RX ORDER — METFORMIN HYDROCHLORIDE 750 MG/1
750 TABLET, EXTENDED RELEASE ORAL 2 TIMES DAILY WITH MEALS
Qty: 180 TABLET | Refills: 0 | Status: SHIPPED | OUTPATIENT
Start: 2023-03-20

## 2023-03-20 NOTE — TELEPHONE ENCOUNTER
LOV: 1/17/23  Next OV: The patient is asked to return in 6 months and pending above  Last Refill:12/19/22  Medication Quantity Refills Start End   METFORMIN  MG Oral Tablet 24 Hr 180 tablet 0 12/19/2022    Sig: Brielle Goetz 1 TABLET(750 MG) BY MOUTH TWICE DAILY WITH MEALS       Diabetic Medication Protocol Failed 03/20/2023 05:51 PM   Protocol Details  Microalbumin procedure in past 12 months or taking ACE/ARB    HgBA1C procedure resulted in past 6 months    Last HgBA1C < 7.5    Appointment in past 6 or next 3 months           Please authorize if acceptable. Thank you!

## 2023-03-21 ENCOUNTER — OFFICE VISIT (OUTPATIENT)
Dept: PHYSICAL THERAPY | Age: 38
End: 2023-03-21
Payer: COMMERCIAL

## 2023-03-21 DIAGNOSIS — M54.2 CERVICALGIA: Primary | ICD-10-CM

## 2023-03-21 PROCEDURE — 97140 MANUAL THERAPY 1/> REGIONS: CPT | Performed by: PHYSICAL THERAPIST

## 2023-03-21 PROCEDURE — 97110 THERAPEUTIC EXERCISES: CPT | Performed by: PHYSICAL THERAPIST

## 2023-03-23 ENCOUNTER — OFFICE VISIT (OUTPATIENT)
Dept: RHEUMATOLOGY | Facility: CLINIC | Age: 38
End: 2023-03-23
Payer: COMMERCIAL

## 2023-03-23 ENCOUNTER — LAB ENCOUNTER (OUTPATIENT)
Dept: LAB | Age: 38
End: 2023-03-23
Attending: INTERNAL MEDICINE
Payer: COMMERCIAL

## 2023-03-23 VITALS
RESPIRATION RATE: 16 BRPM | SYSTOLIC BLOOD PRESSURE: 116 MMHG | OXYGEN SATURATION: 97 % | HEART RATE: 84 BPM | BODY MASS INDEX: 37.95 KG/M2 | TEMPERATURE: 97 F | HEIGHT: 61 IN | DIASTOLIC BLOOD PRESSURE: 78 MMHG | WEIGHT: 201 LBS

## 2023-03-23 DIAGNOSIS — M79.7 FIBROMYALGIA: ICD-10-CM

## 2023-03-23 DIAGNOSIS — G89.29 CHRONIC NECK PAIN: ICD-10-CM

## 2023-03-23 DIAGNOSIS — E55.9 VITAMIN D DEFICIENCY: ICD-10-CM

## 2023-03-23 DIAGNOSIS — M54.2 CHRONIC NECK PAIN: ICD-10-CM

## 2023-03-23 DIAGNOSIS — M48.061 NEUROFORAMINAL STENOSIS OF LUMBAR SPINE: ICD-10-CM

## 2023-03-23 DIAGNOSIS — R53.82 CHRONIC FATIGUE: ICD-10-CM

## 2023-03-23 DIAGNOSIS — E28.2 PCOS (POLYCYSTIC OVARIAN SYNDROME): ICD-10-CM

## 2023-03-23 DIAGNOSIS — M47.816 FACET SYNDROME, LUMBAR: ICD-10-CM

## 2023-03-23 DIAGNOSIS — R76.8 POSITIVE ANA (ANTINUCLEAR ANTIBODY): ICD-10-CM

## 2023-03-23 DIAGNOSIS — R53.82 CHRONIC FATIGUE: Primary | ICD-10-CM

## 2023-03-23 LAB
CRP SERPL-MCNC: 0.96 MG/DL (ref ?–0.3)
ERYTHROCYTE [SEDIMENTATION RATE] IN BLOOD: 22 MM/HR
RHEUMATOID FACT SERPL-ACNC: <10 IU/ML (ref ?–15)
VIT D+METAB SERPL-MCNC: 40.3 NG/ML (ref 30–100)

## 2023-03-23 PROCEDURE — 3074F SYST BP LT 130 MM HG: CPT | Performed by: INTERNAL MEDICINE

## 2023-03-23 PROCEDURE — 99214 OFFICE O/P EST MOD 30 MIN: CPT | Performed by: INTERNAL MEDICINE

## 2023-03-23 PROCEDURE — 82306 VITAMIN D 25 HYDROXY: CPT

## 2023-03-23 PROCEDURE — 86225 DNA ANTIBODY NATIVE: CPT

## 2023-03-23 PROCEDURE — 86038 ANTINUCLEAR ANTIBODIES: CPT

## 2023-03-23 PROCEDURE — 3008F BODY MASS INDEX DOCD: CPT | Performed by: INTERNAL MEDICINE

## 2023-03-23 PROCEDURE — 86140 C-REACTIVE PROTEIN: CPT

## 2023-03-23 PROCEDURE — 86200 CCP ANTIBODY: CPT

## 2023-03-23 PROCEDURE — 3078F DIAST BP <80 MM HG: CPT | Performed by: INTERNAL MEDICINE

## 2023-03-23 PROCEDURE — 86431 RHEUMATOID FACTOR QUANT: CPT

## 2023-03-23 PROCEDURE — 85652 RBC SED RATE AUTOMATED: CPT

## 2023-03-23 RX ORDER — ISOTRETINOIN 40 MG/1
40 CAPSULE ORAL DAILY
COMMUNITY
Start: 2023-02-21

## 2023-03-23 RX ORDER — CYCLOBENZAPRINE HCL 5 MG
TABLET ORAL
COMMUNITY
Start: 2023-02-27

## 2023-03-23 RX ORDER — FLUTICASONE FUROATE AND VILANTEROL TRIFENATATE 100; 25 UG/1; UG/1
1 POWDER RESPIRATORY (INHALATION) DAILY
COMMUNITY
Start: 2023-03-02

## 2023-03-23 NOTE — PATIENT INSTRUCTIONS
Check labs for inflammation, autoimmune disease, rheumatoid arthritis, and D deficiency. Your current problem appears to be mostly fibromyalgia review of aches and pains in your muscles and soft tissues. When she get very achy this can lead to chronic fatigue. The aches and pains take a toll on your system. Continue low-dose Adderall which you are on to hopefully help with the fatigue. I will call you with the results of labs. Try to get 8 hours of sleep at night. Eat a well-balanced diet with more fruit and vegetables. Use ibuprofen 1 or 2 at a time twice a day for aches and pains. Return to office for recheck in 3 months.

## 2023-03-24 LAB
CCP IGG SERPL-ACNC: 1.7 U/ML (ref 0–6.9)
DSDNA IGG SERPL IA-ACNC: 1.1 IU/ML
ENA AB SER QL IA: 0.2 UG/L
ENA AB SER QL IA: NEGATIVE

## 2023-03-27 ENCOUNTER — OFFICE VISIT (OUTPATIENT)
Dept: PHYSICAL THERAPY | Age: 38
End: 2023-03-27
Payer: COMMERCIAL

## 2023-03-27 PROCEDURE — 97140 MANUAL THERAPY 1/> REGIONS: CPT | Performed by: PHYSICAL THERAPIST

## 2023-03-27 PROCEDURE — 97110 THERAPEUTIC EXERCISES: CPT | Performed by: PHYSICAL THERAPIST

## 2023-06-14 ENCOUNTER — PATIENT MESSAGE (OUTPATIENT)
Dept: ORTHOPEDICS CLINIC | Facility: CLINIC | Age: 38
End: 2023-06-14

## 2023-06-15 ENCOUNTER — OFFICE VISIT (OUTPATIENT)
Dept: INTERNAL MEDICINE CLINIC | Facility: CLINIC | Age: 38
End: 2023-06-15
Payer: COMMERCIAL

## 2023-06-15 VITALS
WEIGHT: 198 LBS | SYSTOLIC BLOOD PRESSURE: 128 MMHG | HEART RATE: 80 BPM | OXYGEN SATURATION: 98 % | HEIGHT: 61 IN | BODY MASS INDEX: 37.38 KG/M2 | RESPIRATION RATE: 16 BRPM | DIASTOLIC BLOOD PRESSURE: 78 MMHG

## 2023-06-15 DIAGNOSIS — Z51.81 ENCOUNTER FOR THERAPEUTIC DRUG MONITORING: Primary | ICD-10-CM

## 2023-06-15 DIAGNOSIS — E88.81 INSULIN RESISTANCE: ICD-10-CM

## 2023-06-15 DIAGNOSIS — E28.2 PCOS (POLYCYSTIC OVARIAN SYNDROME): ICD-10-CM

## 2023-06-15 DIAGNOSIS — K91.5 POST-CHOLECYSTECTOMY SYNDROME: ICD-10-CM

## 2023-06-15 DIAGNOSIS — F32.A ANXIETY AND DEPRESSION: ICD-10-CM

## 2023-06-15 DIAGNOSIS — E66.01 CLASS 2 SEVERE OBESITY WITH SERIOUS COMORBIDITY AND BODY MASS INDEX (BMI) OF 37.0 TO 37.9 IN ADULT, UNSPECIFIED OBESITY TYPE (HCC): ICD-10-CM

## 2023-06-15 DIAGNOSIS — G89.29 OTHER CHRONIC PAIN: ICD-10-CM

## 2023-06-15 DIAGNOSIS — F41.9 ANXIETY AND DEPRESSION: ICD-10-CM

## 2023-06-15 PROBLEM — E66.812 CLASS 2 SEVERE OBESITY WITH SERIOUS COMORBIDITY AND BODY MASS INDEX (BMI) OF 37.0 TO 37.9 IN ADULT (HCC): Status: ACTIVE | Noted: 2023-06-15

## 2023-06-15 PROCEDURE — 3008F BODY MASS INDEX DOCD: CPT | Performed by: NURSE PRACTITIONER

## 2023-06-15 PROCEDURE — 3078F DIAST BP <80 MM HG: CPT | Performed by: NURSE PRACTITIONER

## 2023-06-15 PROCEDURE — 99214 OFFICE O/P EST MOD 30 MIN: CPT | Performed by: NURSE PRACTITIONER

## 2023-06-15 PROCEDURE — 3074F SYST BP LT 130 MM HG: CPT | Performed by: NURSE PRACTITIONER

## 2023-06-15 RX ORDER — DEXTROAMPHETAMINE SACCHARATE, AMPHETAMINE ASPARTATE MONOHYDRATE, DEXTROAMPHETAMINE SULFATE AND AMPHETAMINE SULFATE 2.5; 2.5; 2.5; 2.5 MG/1; MG/1; MG/1; MG/1
CAPSULE, EXTENDED RELEASE ORAL
COMMUNITY

## 2023-06-15 RX ORDER — BUPROPION HYDROCHLORIDE 150 MG/1
150 TABLET, EXTENDED RELEASE ORAL DAILY
COMMUNITY
Start: 2023-06-01

## 2023-06-15 RX ORDER — AZELASTINE 1 MG/ML
2 SPRAY, METERED NASAL 2 TIMES DAILY
COMMUNITY
Start: 2023-05-31

## 2023-06-15 RX ORDER — FLUTICASONE PROPIONATE 50 MCG
2 SPRAY, SUSPENSION (ML) NASAL DAILY
COMMUNITY
Start: 2023-05-31

## 2023-06-15 RX ORDER — ALBUTEROL SULFATE 90 UG/1
AEROSOL, METERED RESPIRATORY (INHALATION) AS DIRECTED
COMMUNITY

## 2023-06-15 RX ORDER — ISOTRETINOIN 20 MG/1
20 CAPSULE, LIQUID FILLED ORAL DAILY
COMMUNITY
Start: 2023-03-22

## 2023-06-15 RX ORDER — LIRAGLUTIDE 6 MG/ML
3 INJECTION, SOLUTION SUBCUTANEOUS DAILY
Qty: 15 ML | Refills: 0 | Status: SHIPPED | OUTPATIENT
Start: 2023-06-15

## 2023-06-15 RX ORDER — BUPROPION HYDROCHLORIDE 150 MG/1
TABLET, EXTENDED RELEASE ORAL
COMMUNITY
Start: 2023-05-01

## 2023-06-15 NOTE — TELEPHONE ENCOUNTER
New imedohart appt was set up for achilles tendon after 6/14 message was sent. Patient called the office today 6/15 before she saw latest mychart message, pt was upset at [facility] on the phone for the cancellation. Patient's original appt note for new appt did not specify it was for achilles tendon. However, newest mychart message only asked which achilles tendon the appt was for. Patient did not allow PSR to finish explaining this and hung up phone.     Future Appointments   Date Time Provider Shanice Mosley   6/30/2023  9:20 AM Bishop Nimesh MD [id number]

## 2023-06-15 NOTE — PATIENT INSTRUCTIONS
Welcome to the [facility] Program...your Lifestyle Renovation begins now! Thank you for placing your trust in our health care team, I look forward to working with you along this journey to better health! Next steps:     1. Call our office at [phone] to schedule a personal nutrition consultation with one of our registered dieticians. Bring along your food journal (3 days minimum). See journal options below. 2.  Complete non fasting labs at [facility] lab site prior to next office visit. Lab results will be communicated via Free & Clear. 3.  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 Please try to work on the following dietary changes this first month:    1. Drink water with meals and throughout the day, cut down on soda and/or juice if consumed. Consider flavored water options like Bubbly, Spindrift, Hint and Viral. 2.  Eat breakfast daily and focus on having protein with each meal, examples include: greek yogurt, cottage cheese, hard boiled egg, whole grain toast with peanut butter. 3.  Reduce refined carbohydrates and sugars which includes items such as sweets, as well as rice, pasta, and bread and make sure to choose whole grain options when having them with just 1 serving per meal about the size of your inner palm. 4.  Consume non starchy veggies daily working towards making them a good 50% of your daily food intake. Add them to lunch and dinner consistently. 5.  Start a daily probiotic: VSL#3 is recommended, (order on line at www.vsl3. com).  Take 1 capsule daily with water for 30 days, then reduce to 1 every other day (this will reduce the cost). Capsules can be left out for 2 weeks, but then must be refrigerated. Please download brian My Fitness Illene Screen! Or Net Diary to monitor daily dietary intake and you will be able to see if you are eating the right amount of calories or too much or too little which would hinder weight loss. Additionally this will help to see your daily carbohydrate and protein intake. When you set the brian up choose 1-2 lbs/week as a goal.  Keeping a paper food journal is an option as well to remain accountable for your choices- this is the start to mindful eating! Continue or start exercising to help establish a routine. If not already exercising begin with 1 day and progress as able with long-term goal of 30 minutes 5 days a week at a minimum. Meditation daily can help manage and control stress. Chronic stress can make weight loss difficult. Exercising is one way to help with stress, but meditation using the CALM Brian or another comparable alternative can be done in your home or place of work with little time commitment. This Brian can also help work on behavior change and improve sleep. Check out the segment under Calm Masterclass and listen to The 4 Pillars of Health. A great way to begin learning about the foundation of lifestyle with practical tips to use in your every day. Check out www.yourweightmatters. org blog for continued daily support and education along this weight loss journey! Patient Resources:    Personal Training/Fitness Classes/Health Coaching    Anthony Hunt and Choi Sophiaside @ [email or web address]-[email or web address]/ Full fitness center with group fitness and personal training located in [facility] Discount available when client of [facility] Weight Management. Health Coaching with Rosalinda Chang, Carlos Strickland, and Hyacinth Rodriges at our [facility]- individual coaching to work on your health goals. Call [phone] and/or email @ [email or web address].  Free 60 minute consult when client of [facility] Weight Management. 360FIT Lito @ https://www.mccloud.com/. A variety of group fitness options plus various yoga classes [phone] and/or email Ivana Ortez at [email or web address]  [address] [facility] with multiple locations: [employer]et[employer] (www.Magma GlobaltheNational Payment Network. Gotta'go Personal Care Device), F45 Training (www.d05vaqiomewGrand Rounds), Datorama Body Bootcamp (www.iMOSPHEREbodybootutoopiap.Gotta'go Personal Care Device), Drais Pharmaceuticals (www.Blume Distillation), The Exercise  (www.exercisecoach.com), Club PilSideband Networks (www.RollUp Media)    Online Fitness  Fitness  on [employer] in 10 DVD series   www. xnpid81JNHConstant Contact  Chair exercises via Sit and Be Fit (www.sitandbefit.org) and 9 Mindiee Aeropostale (www.growyoungfitness. com) or Joy Pineda or Marlin Hinds videos on YouTube. Hip Hop Fit with Ciro Ulrich at www.hiphopfit. Enbridge    Apps for on the myBestHelper 7 Minute Workout (orange box with white 7) - free on the go HIIT training kaylee  Peloton Kaylee @ The Beer CafÃ©    Nutrition Trackers and Tools  LoseIT! And My Fitness Pal apps and on line for tracking nutrition  NOOM - virtual health coaching  FitFoundation (healthy meals on the go) in [facility] @ www. nbsekfrrpimtf0v. Roxy Abbasi MD @ www.Vigilos.com and Mirtha Walker (calorie smart and low carb plans recommended) @ www. AKHRNC92.VVT, Metabolic Meals @ www. MyMetabolicMeals. com - individual prepared meals to go  UI Robot, EnerLume Energy Management, International Business Machines, Every Plate, Jellycoaster- on line meal delivery programs for preparation at home  AK CitySourced in [place] for homemade meals to go @ www.mealvillage. com  Diet Doctor @ www. dietdoctor. com - low carb swaps  YuAMCAD - meal prep and planning kaylee (www.yummly. com)    Stress Management/Behavior/Mindful Eating  CALM meditation kaylee (www.calm. Gotta'go Personal Care Device)  Headspace  Am I Hungry? Mindful eating virtual  kaylee (www.LegalZoom)  Www.yourweightmatters. org - Obesity Action Coalition sponsored Blog posts daily  Motivation kaylee (black box with white \")- daily supportive messages sent to your phone  The Hungry Brain by Carolina Winchester, PhD    Books/Video Education/Podcasts  Mindless Eating by Parker Hutton  Obesity Action Coalition Resources on topics specific to weight management (www.obesityaction. org)  Why We Get Sick by Ramiro Lopez (a book about insulin resistance)  Atomic Habits by Daphne Taylor (a book about taking small steps to promote greater behavior change)   Can't Hurt Me by Francois Scruggs (a book exploring the power of discipline in achieving your goals)  The End of Dieting: How to Live for Life by Dr. Annita Zavala M.D. or listen to The [facility] Episode 61: Understanding \"Nutritarian\" Eating w/Dr. Annita Zavala  Your Body in Balance: The Collected Inc. of Food, Hormones, and Health by Dr. Andreea Pal  The Complete Guide to fasting by Dr. Latisha Whitten Like a Girl by Dr. Ailyn Hernandez, [address] by Jai Donahue, Ph.D, R.D. Weight Loss Surgery Will Not Treat Food Addiction by Maddie Roth Ph.D  The [address] [place] [address] on plant based nutrition  Fed Up - documentary about obesity (Free on [facility])  The Truth About Sugar - documentary on sugar (Free on Miaoyushang, https://youUnfoldu. be/1Y0usnlIL0i)  The Dr. Stew Pak by Dr. Christina Pierre MD  Fitlosophy Fitspiration - journal to better health (found at [employer] in fitness aisle)  What Happened to You?- a look at the impact trauma has on behavior written by Sebastián Doran and Dr. Anabella Pérez Again by Janneth Zheng - discovering your true self after trauma  Declan Fletcher talk on NetSolorein Technology, The Call to Courage  Podcasts: The Exam Room by the Physician's Committee, Nutrition Facts by Dr. Aria Shah, Simple Steps to Healthier More Balanced Living with certified health and  Moraima Garland.

## 2023-06-16 DIAGNOSIS — E28.2 PCOS (POLYCYSTIC OVARIAN SYNDROME): ICD-10-CM

## 2023-06-16 RX ORDER — PANCRELIPASE LIPASE, PANCRELIPASE PROTEASE, PANCRELIPASE AMYLASE 40000; 126000; 168000 [USP'U]/1; [USP'U]/1; [USP'U]/1
CAPSULE, DELAYED RELEASE ORAL
Qty: 48 CAPSULE | Refills: 0 | COMMUNITY
Start: 2023-06-16

## 2023-06-16 RX ORDER — METFORMIN HYDROCHLORIDE 750 MG/1
750 TABLET, EXTENDED RELEASE ORAL 2 TIMES DAILY WITH MEALS
Qty: 60 TABLET | Refills: 0 | Status: SHIPPED | OUTPATIENT
Start: 2023-06-16

## 2023-06-16 NOTE — TELEPHONE ENCOUNTER
LOV: 01/17/2023    Last Refill:   Medication Quantity Refills Start End   metFORMIN  MG Oral Tablet 24 Hr 180 tablet 0 3/20/2023      RTC: leslie appt 07/14/2023    Protocol: failed    Refill pended. Please approve if okay. Thank you.

## 2023-06-20 ENCOUNTER — OFFICE VISIT (OUTPATIENT)
Dept: RHEUMATOLOGY | Facility: CLINIC | Age: 38
End: 2023-06-20
Payer: COMMERCIAL

## 2023-06-20 VITALS
DIASTOLIC BLOOD PRESSURE: 76 MMHG | RESPIRATION RATE: 16 BRPM | SYSTOLIC BLOOD PRESSURE: 122 MMHG | BODY MASS INDEX: 37 KG/M2 | WEIGHT: 198.19 LBS | HEART RATE: 94 BPM | OXYGEN SATURATION: 98 % | TEMPERATURE: 98 F

## 2023-06-20 DIAGNOSIS — M47.816 FACET SYNDROME, LUMBAR: ICD-10-CM

## 2023-06-20 DIAGNOSIS — M48.061 NEUROFORAMINAL STENOSIS OF LUMBAR SPINE: ICD-10-CM

## 2023-06-20 DIAGNOSIS — M54.50 CHRONIC LEFT-SIDED LOW BACK PAIN WITHOUT SCIATICA: ICD-10-CM

## 2023-06-20 DIAGNOSIS — M70.62 TROCHANTERIC BURSITIS OF LEFT HIP: ICD-10-CM

## 2023-06-20 DIAGNOSIS — G89.29 CHRONIC LEFT-SIDED LOW BACK PAIN WITHOUT SCIATICA: ICD-10-CM

## 2023-06-20 DIAGNOSIS — E66.01 CLASS 2 SEVERE OBESITY WITH SERIOUS COMORBIDITY AND BODY MASS INDEX (BMI) OF 37.0 TO 37.9 IN ADULT, UNSPECIFIED OBESITY TYPE (HCC): ICD-10-CM

## 2023-06-20 DIAGNOSIS — R53.82 CHRONIC FATIGUE: ICD-10-CM

## 2023-06-20 DIAGNOSIS — M79.7 FIBROMYALGIA: Primary | ICD-10-CM

## 2023-06-20 PROBLEM — M79.675 PAIN OF LEFT GREAT TOE: Status: RESOLVED | Noted: 2021-07-16 | Resolved: 2023-06-20

## 2023-06-20 PROBLEM — M25.50 DIFFUSE ARTHRALGIA: Status: RESOLVED | Noted: 2018-08-03 | Resolved: 2023-06-20

## 2023-06-20 PROCEDURE — 3078F DIAST BP <80 MM HG: CPT | Performed by: INTERNAL MEDICINE

## 2023-06-20 PROCEDURE — 99214 OFFICE O/P EST MOD 30 MIN: CPT | Performed by: INTERNAL MEDICINE

## 2023-06-20 PROCEDURE — 3074F SYST BP LT 130 MM HG: CPT | Performed by: INTERNAL MEDICINE

## 2023-06-20 RX ORDER — MELOXICAM 15 MG/1
15 TABLET ORAL DAILY
Qty: 30 TABLET | Refills: 3 | Status: SHIPPED | OUTPATIENT
Start: 2023-06-20

## 2023-06-20 RX ORDER — CHOLESTYRAMINE LIGHT 4 G/5.7G
POWDER, FOR SUSPENSION ORAL
COMMUNITY
End: 2023-06-20

## 2023-06-20 NOTE — PATIENT INSTRUCTIONS
OK to take Meloxicam 15mg per day for treatment of joint pain and fibromyalgia pain. YOur left hip appears to have bursitis. Apply a heating pad to the left hip. 10-15 minutes at a time  Take the Meloxicam daily. ,.  Rub left hip with otc diclofenac gel or other arthritis creams. You might need a cortisone shot in the left hip bursa. Continue Wellbutrin SR 1 a day to help with fatigue and fibromyalgia. Exercise regularly, do some walking, stretching is can help your fibromyalgia. For sleep at night okay to take a CBD gummy. Avoid excessive red meat, excessive carbohydrates, junk food - they contribute to inflammation. Eat more fruit, veggies, lean cuts of meat. RTO 3 months.

## 2023-06-21 ENCOUNTER — TELEPHONE (OUTPATIENT)
Dept: INTERNAL MEDICINE CLINIC | Facility: CLINIC | Age: 38
End: 2023-06-21

## 2023-06-21 DIAGNOSIS — K91.5 POST-CHOLECYSTECTOMY SYNDROME: ICD-10-CM

## 2023-06-21 DIAGNOSIS — E66.01 CLASS 2 SEVERE OBESITY WITH SERIOUS COMORBIDITY AND BODY MASS INDEX (BMI) OF 37.0 TO 37.9 IN ADULT, UNSPECIFIED OBESITY TYPE (HCC): ICD-10-CM

## 2023-06-25 NOTE — TELEPHONE ENCOUNTER
Betyenda approved    Approved    Prior authorization approved Case ID: [id number]      Payer:  [address]   [phone]  [place]    PUHEX:[id number];AATWWD:[id number]; Review Type:Prior Auth; Coverage Start Date:05/23/2023; Coverage End Date:10/20/2023;   Approval Details    Authorized from May 23, 2023 to October 20, 2023      Electronic appeal:  Not supported   View History     Medication Being Authorized     SAXENDA 18 MG/3ML Subcutaneous Solution Pen-injector

## 2023-06-26 RX ORDER — PANCRELIPASE LIPASE, PANCRELIPASE PROTEASE, PANCRELIPASE AMYLASE 40000; 126000; 168000 [USP'U]/1; [USP'U]/1; [USP'U]/1
CAPSULE, DELAYED RELEASE ORAL
Qty: 48 CAPSULE | Refills: 0 | Status: SHIPPED | OUTPATIENT
Start: 2023-06-26

## 2023-06-28 ENCOUNTER — MED REC SCAN ONLY (OUTPATIENT)
Dept: FAMILY MEDICINE CLINIC | Facility: CLINIC | Age: 38
End: 2023-06-28

## 2023-07-07 ENCOUNTER — TELEPHONE (OUTPATIENT)
Dept: INTERNAL MEDICINE CLINIC | Facility: CLINIC | Age: 38
End: 2023-07-07

## 2023-07-07 ENCOUNTER — OFFICE VISIT (OUTPATIENT)
Dept: FAMILY MEDICINE CLINIC | Facility: CLINIC | Age: 38
End: 2023-07-07
Payer: COMMERCIAL

## 2023-07-07 VITALS
RESPIRATION RATE: 16 BRPM | SYSTOLIC BLOOD PRESSURE: 122 MMHG | DIASTOLIC BLOOD PRESSURE: 76 MMHG | HEIGHT: 61 IN | BODY MASS INDEX: 36.37 KG/M2 | HEART RATE: 80 BPM | WEIGHT: 192.63 LBS | TEMPERATURE: 98 F

## 2023-07-07 DIAGNOSIS — Z00.00 ROUTINE GENERAL MEDICAL EXAMINATION AT A HEALTH CARE FACILITY: Primary | ICD-10-CM

## 2023-07-07 DIAGNOSIS — Z00.00 LABORATORY EXAMINATION ORDERED AS PART OF A ROUTINE GENERAL MEDICAL EXAMINATION: ICD-10-CM

## 2023-07-07 DIAGNOSIS — Z82.49 FAMILY HISTORY OF AORTIC ANEURYSM: ICD-10-CM

## 2023-07-07 DIAGNOSIS — Z51.81 ENCOUNTER FOR THERAPEUTIC DRUG MONITORING: Primary | ICD-10-CM

## 2023-07-07 DIAGNOSIS — Z12.31 ENCOUNTER FOR SCREENING MAMMOGRAM FOR MALIGNANT NEOPLASM OF BREAST: ICD-10-CM

## 2023-07-07 DIAGNOSIS — E66.01 CLASS 2 SEVERE OBESITY WITH SERIOUS COMORBIDITY AND BODY MASS INDEX (BMI) OF 37.0 TO 37.9 IN ADULT, UNSPECIFIED OBESITY TYPE: ICD-10-CM

## 2023-07-07 PROCEDURE — 3074F SYST BP LT 130 MM HG: CPT | Performed by: FAMILY MEDICINE

## 2023-07-07 PROCEDURE — 99396 PREV VISIT EST AGE 40-64: CPT | Performed by: FAMILY MEDICINE

## 2023-07-07 PROCEDURE — 3008F BODY MASS INDEX DOCD: CPT | Performed by: FAMILY MEDICINE

## 2023-07-07 PROCEDURE — 3078F DIAST BP <80 MM HG: CPT | Performed by: FAMILY MEDICINE

## 2023-07-07 RX ORDER — PEN NEEDLE, DIABETIC 32GX 5/32"
1 NEEDLE, DISPOSABLE MISCELLANEOUS DAILY
Qty: 100 EACH | Refills: 1 | Status: SHIPPED | OUTPATIENT
Start: 2023-07-07 | End: 2024-07-06

## 2023-07-07 RX ORDER — PEN NEEDLE, DIABETIC 32 GX 1/4"
NEEDLE, DISPOSABLE MISCELLANEOUS
Qty: 90 EACH | Refills: 0 | Status: SHIPPED | OUTPATIENT
Start: 2023-07-07

## 2023-07-07 NOTE — TELEPHONE ENCOUNTER
Patients received Rx for saxenda but pharm stated there wasn't Rx for the needles. Patient has medication but no needles.

## 2023-07-16 ENCOUNTER — PATIENT MESSAGE (OUTPATIENT)
Dept: FAMILY MEDICINE CLINIC | Facility: CLINIC | Age: 38
End: 2023-07-16

## 2023-07-17 ENCOUNTER — LAB ENCOUNTER (OUTPATIENT)
Dept: LAB | Age: 38
End: 2023-07-17
Attending: PHYSICIAN ASSISTANT
Payer: COMMERCIAL

## 2023-07-17 ENCOUNTER — LAB ENCOUNTER (OUTPATIENT)
Dept: LAB | Age: 38
End: 2023-07-17
Attending: FAMILY MEDICINE
Payer: COMMERCIAL

## 2023-07-17 DIAGNOSIS — E88.81 INSULIN RESISTANCE: ICD-10-CM

## 2023-07-17 DIAGNOSIS — Z79.899 LONG-TERM USE OF HIGH-RISK MEDICATION: Primary | ICD-10-CM

## 2023-07-17 DIAGNOSIS — R79.89 ELEVATED SERUM CREATININE: Primary | ICD-10-CM

## 2023-07-17 DIAGNOSIS — E28.2 PCOS (POLYCYSTIC OVARIAN SYNDROME): ICD-10-CM

## 2023-07-17 DIAGNOSIS — Z51.81 ENCOUNTER FOR THERAPEUTIC DRUG MONITORING: ICD-10-CM

## 2023-07-17 DIAGNOSIS — E66.01 CLASS 2 SEVERE OBESITY WITH SERIOUS COMORBIDITY AND BODY MASS INDEX (BMI) OF 37.0 TO 37.9 IN ADULT, UNSPECIFIED OBESITY TYPE: ICD-10-CM

## 2023-07-17 DIAGNOSIS — Z00.00 LABORATORY EXAMINATION ORDERED AS PART OF A ROUTINE GENERAL MEDICAL EXAMINATION: ICD-10-CM

## 2023-07-17 LAB
ALBUMIN SERPL-MCNC: 3.9 G/DL (ref 3.4–5)
ALBUMIN/GLOB SERPL: 1.1 {RATIO} (ref 1–2)
ALP LIVER SERPL-CCNC: 89 U/L
ALT SERPL-CCNC: 41 U/L
ALT SERPL-CCNC: 42 U/L
ANION GAP SERPL CALC-SCNC: 8 MMOL/L (ref 0–18)
AST SERPL-CCNC: 25 U/L (ref 15–37)
AST SERPL-CCNC: 30 U/L (ref 15–37)
B-HCG SERPL-ACNC: <1 MIU/ML
BILIRUB SERPL-MCNC: 0.4 MG/DL (ref 0.1–2)
BILIRUB UR QL STRIP.AUTO: NEGATIVE
BUN BLD-MCNC: 8 MG/DL (ref 7–18)
CALCIUM BLD-MCNC: 9.5 MG/DL (ref 8.5–10.1)
CHLORIDE SERPL-SCNC: 108 MMOL/L (ref 98–112)
CHOLEST SERPL-MCNC: 122 MG/DL (ref ?–200)
CLARITY UR REFRACT.AUTO: CLEAR
CO2 SERPL-SCNC: 22 MMOL/L (ref 21–32)
COLOR UR AUTO: YELLOW
CREAT BLD-MCNC: 1.28 MG/DL
EST. AVERAGE GLUCOSE BLD GHB EST-MCNC: 114 MG/DL (ref 68–126)
FASTING PATIENT LIPID ANSWER: YES
FASTING STATUS PATIENT QL REPORTED: YES
GFR SERPLBLD BASED ON 1.73 SQ M-ARVRAT: 54 ML/MIN/1.73M2 (ref 60–?)
GLOBULIN PLAS-MCNC: 3.6 G/DL (ref 2.8–4.4)
GLUCOSE BLD-MCNC: 82 MG/DL (ref 70–99)
GLUCOSE UR STRIP.AUTO-MCNC: NEGATIVE MG/DL
HBA1C MFR BLD: 5.6 % (ref ?–5.7)
HDLC SERPL-MCNC: 38 MG/DL (ref 40–59)
KETONES UR STRIP.AUTO-MCNC: NEGATIVE MG/DL
LDLC SERPL CALC-MCNC: 55 MG/DL (ref ?–100)
LEUKOCYTE ESTERASE UR QL STRIP.AUTO: NEGATIVE
NITRITE UR QL STRIP.AUTO: NEGATIVE
NONHDLC SERPL-MCNC: 84 MG/DL (ref ?–130)
OSMOLALITY SERPL CALC.SUM OF ELEC: 283 MOSM/KG (ref 275–295)
PH UR STRIP.AUTO: 5 [PH] (ref 5–8)
POTASSIUM SERPL-SCNC: 4.1 MMOL/L (ref 3.5–5.1)
PROT SERPL-MCNC: 7.5 G/DL (ref 6.4–8.2)
PROT UR STRIP.AUTO-MCNC: NEGATIVE MG/DL
RBC UR QL AUTO: NEGATIVE
SODIUM SERPL-SCNC: 138 MMOL/L (ref 136–145)
SP GR UR STRIP.AUTO: 1.01 (ref 1–1.03)
TRIGL SERPL-MCNC: 172 MG/DL (ref 30–149)
TRIGL SERPL-MCNC: 182 MG/DL (ref 30–149)
TSI SER-ACNC: 2.15 MIU/ML (ref 0.36–3.74)
UROBILINOGEN UR STRIP.AUTO-MCNC: <2 MG/DL
VIT B12 SERPL-MCNC: 383 PG/ML (ref 193–986)
VLDLC SERPL CALC-MCNC: 25 MG/DL (ref 0–30)

## 2023-07-17 PROCEDURE — 84702 CHORIONIC GONADOTROPIN TEST: CPT

## 2023-07-17 PROCEDURE — 82607 VITAMIN B-12: CPT

## 2023-07-17 PROCEDURE — 84478 ASSAY OF TRIGLYCERIDES: CPT

## 2023-07-17 PROCEDURE — 81003 URINALYSIS AUTO W/O SCOPE: CPT

## 2023-07-17 PROCEDURE — 83036 HEMOGLOBIN GLYCOSYLATED A1C: CPT

## 2023-07-17 PROCEDURE — 80061 LIPID PANEL: CPT

## 2023-07-17 PROCEDURE — 84460 ALANINE AMINO (ALT) (SGPT): CPT

## 2023-07-17 PROCEDURE — 80053 COMPREHEN METABOLIC PANEL: CPT

## 2023-07-17 PROCEDURE — 84443 ASSAY THYROID STIM HORMONE: CPT

## 2023-07-17 PROCEDURE — 84450 TRANSFERASE (AST) (SGOT): CPT

## 2023-07-18 ENCOUNTER — TELEPHONE (OUTPATIENT)
Dept: INTERNAL MEDICINE CLINIC | Facility: CLINIC | Age: 38
End: 2023-07-18

## 2023-07-18 RX ORDER — METFORMIN HYDROCHLORIDE 500 MG/1
500 TABLET, EXTENDED RELEASE ORAL DAILY
Qty: 90 TABLET | Refills: 1 | Status: SHIPPED | OUTPATIENT
Start: 2023-07-18

## 2023-07-18 NOTE — TELEPHONE ENCOUNTER
Patient called this morning to discuss another issue she is having with Stevie Pep. She was given this RX at the end of June. Recently had filled at pharmacy 40,000 units taking 6-8 caps a day. She has abdominal pain, extreme bloating and gas - very uncomfortable to the point she cannot eat/drink properly. She goes a couple days without a bowel movement and then strains and then is diarrhea. She had labs per her pcp and her kidney function is affected - she feels possibly dehydrated. She wonders about stopping stevie pep and maybe resuming at a lower dose or less capsules daily?

## 2023-07-18 NOTE — TELEPHONE ENCOUNTER
Doshi Cough is a digestive enzyme that was provided to help reduce post norbert symptoms of loose stool. If she does not feel it was helping for previously present loose stool then she can stop it. However I do not feel it is the cause to the isolated lower GFR on her recent labs. Continue with previous recommendation of holding Metformin, starting VSL#3 and continue Saxenda. Can maintain Saxenda at current dose without titration and reevaluate in 1-2 weeks. Hydrate with water. Recommend VV is possible in the next 2 weeks.  Thanks

## 2023-07-19 ENCOUNTER — OFFICE VISIT (OUTPATIENT)
Dept: INTERNAL MEDICINE CLINIC | Facility: CLINIC | Age: 38
End: 2023-07-19
Payer: COMMERCIAL

## 2023-07-19 ENCOUNTER — MED REC SCAN ONLY (OUTPATIENT)
Dept: FAMILY MEDICINE CLINIC | Facility: CLINIC | Age: 38
End: 2023-07-19

## 2023-07-19 DIAGNOSIS — K91.5 POST-CHOLECYSTECTOMY SYNDROME: ICD-10-CM

## 2023-07-19 DIAGNOSIS — E66.01 CLASS 2 SEVERE OBESITY WITH SERIOUS COMORBIDITY AND BODY MASS INDEX (BMI) OF 37.0 TO 37.9 IN ADULT, UNSPECIFIED OBESITY TYPE: Primary | ICD-10-CM

## 2023-07-19 DIAGNOSIS — F41.9 ANXIETY AND DEPRESSION: ICD-10-CM

## 2023-07-19 DIAGNOSIS — F32.A ANXIETY AND DEPRESSION: ICD-10-CM

## 2023-07-19 DIAGNOSIS — G89.29 OTHER CHRONIC PAIN: ICD-10-CM

## 2023-07-19 DIAGNOSIS — E88.81 INSULIN RESISTANCE: ICD-10-CM

## 2023-07-19 DIAGNOSIS — E28.2 PCOS (POLYCYSTIC OVARIAN SYNDROME): ICD-10-CM

## 2023-07-19 DIAGNOSIS — K52.9 CHRONIC DIARRHEA: ICD-10-CM

## 2023-07-19 PROCEDURE — 97802 MEDICAL NUTRITION INDIV IN: CPT | Performed by: DIETITIAN, REGISTERED

## 2023-08-15 ENCOUNTER — TELEMEDICINE (OUTPATIENT)
Dept: TELEHEALTH | Age: 38
End: 2023-08-15

## 2023-08-15 DIAGNOSIS — R39.9 UTI SYMPTOMS: Primary | ICD-10-CM

## 2023-08-15 DIAGNOSIS — N30.00 ACUTE CYSTITIS WITHOUT HEMATURIA: ICD-10-CM

## 2023-08-15 PROCEDURE — 99213 OFFICE O/P EST LOW 20 MIN: CPT | Performed by: NURSE PRACTITIONER

## 2023-08-15 RX ORDER — NITROFURANTOIN 25; 75 MG/1; MG/1
100 CAPSULE ORAL 2 TIMES DAILY
Qty: 10 CAPSULE | Refills: 0 | Status: SHIPPED | OUTPATIENT
Start: 2023-08-15 | End: 2023-08-20

## 2023-08-15 NOTE — PATIENT INSTRUCTIONS
Push fluids  Antibiotic as prescribed  Follow up in person for new or worsening symptoms or if symptoms not improving the next 2 days.

## 2023-08-15 NOTE — PROGRESS NOTES
Virtual/Telephone Check-In    Clifton Melendez verbally consents to a Virtual/Telephone Check-In service on 08/15/23. Patient has been referred to the [facility] website at [email or web address] to review the yearly Consent to Treat document. Patient understands and accepts financial responsibility for any deductible, co-insurance and/or co-pays associated with this service. Telehealth Verbal Consent   I conducted a telehealth visit with Clifton Melendez today, 08/15/23, which was completed using two-way, real-time interactive audio and video communication. This has been done in good megha to provide continuity of care in the best interest of the provider-patient relationship. Every conscious effort was taken to allow for sufficient and adequate time to complete the visit. The patient was made aware of the limitations of the telehealth visit, including treatment limitations as physical exam through video visit is limited. The patient was advised to call 911 or to go to the ER in case there was an emergency. The patient was also advised of the potential privacy & security concerns related to the telehealth platform. The patient was made aware of where to find [facility] notice of privacy practices, telehealth consent form and other related consent forms and documents. which are located on the [facility] website. The patient verbally agreed to telehealth consent form, related consents and the risks discussed. Lastly, the patient confirmed that they were in [place]. Included in this visit, time may have been spent reviewing labs, medications, radiology tests and decision making. Appropriate medical decision-making and tests are ordered as detailed in the plan of care above. Coding/billing information is submitted for this visit based on complexity of care and/or time spent for the visit.     CHIEF COMPLAINT:   Patient presents with:  UTI      HPI:   Clifton Melendez is a 34year old female who presents for a video visit. Patient reports UTI symptoms. Reports symptoms started yesterday, has urinary urgency, frequency, and dysuria,  Pt has hx of UTI, but none in the past year. Currently on menses, states her period started Saturday. Reports has had some mild menstrual cramping. Reports the flow of her period flow is normal. Denies concerns for STD's or pregnancy. Current Outpatient Medications   Medication Sig Dispense Refill    metFORMIN  MG Oral Tablet 24 Hr Take 1 tablet (500 mg total) by mouth daily. 90 tablet 1    Insulin Pen Needle (BD PEN NEEDLE RICH U/F) 32G X 4 MM Does not apply Misc Inject 1 Pen into the skin daily. 100 each 1    Insulin Pen Needle (NOVOFINE PEN NEEDLE) 32G X 6 MM Does not apply Misc As directed 90 each 0    Pancrelipase, Lip-Prot-Amyl, (ZENPEP) 42425-466318 units Oral Cap DR Particles Take 2 capsules by mouth 3 (three) times daily with meals. May also take 1 capsule 2 (two) times daily as needed (snack). 720 capsule 1    Meloxicam 15 MG Oral Tab Take 1 tablet (15 mg total) by mouth daily. 30 tablet 3    amphetamine-dextroamphetamine ER 10 MG Oral Capsule SR 24 Hr 5 mg. mupirocin 2 % External Ointment       azelastine 0.1 % Nasal Solution 2 sprays by Nasal route 2 (two) times daily. buPROPion  MG Oral Tablet 12 Hr Take 1 tablet (150 mg total) by mouth daily. fluticasone propionate 50 MCG/ACT Nasal Suspension 2 sprays by Nasal route daily. albuterol 108 (90 Base) MCG/ACT Inhalation Aero Soln As Directed. SAXENDA 18 MG/3ML Subcutaneous Solution Pen-injector Inject 3 mg into the skin daily. 15 mL 0    cyclobenzaprine 5 MG Oral Tab TAKE ONE TABLET BY MOUTH EVERY NIGHT AT BEDTIME OR TWICE DAILY AS NEEDED FOR NECK PAIN / SPASMS      ABSORICA 40 MG Oral Cap Take 1.25 capsules (50 mg total) by mouth daily. Potassium 99 MG Oral Tab Take by mouth. Magnesium 100 MG Oral Cap Take by mouth.       cholestyramine light 4 g Oral Powd Pack Take 1 packet (4 g total) by mouth 2 (two) times daily. MIX WITH LIQUID 180 each 0    Glucosamine-Chondroit-Collagen ([employer] GLUCO-CHONDROIT PLUS UC-II) 125-100-10 MG Oral Tab Take 125 mg by mouth 2 (two) times a day. SUMAtriptan Succinate (IMITREX) 100 MG Oral Tab 1 po once. Repeat once after 2 hours if needed. Max 2 per 24 hours. 9 tablet 1    diazePAM 2 MG Oral Tab as needed. amphetamine-dextroamphetamine 10 MG Oral Tab 5mg bid      BIOTIN OR Take by mouth. Omega-3 Fatty Acids (OMEGA-3 FISH OIL OR) Take by mouth. Vitamin D3 1000 units Oral Tab Take 1 tablet (1,000 Units total) by mouth daily. Past Medical History:   Diagnosis Date    Anesthesia complication     HARD TO AWAKEN    Asthma     due bouts of flu    Bipolar I disorder, single manic episode ([address] 2012    Neurocardiogenic syncope     had as teen, outgrew    Polycystic disease, ovaries     PONV (postoperative nausea and vomiting)     Post-cholecystectomy syndrome       Past Surgical History:   Procedure Laterality Date          CHOLECYSTECTOMY  \    COLONOSCOPY N/A 10/15/2015    Procedure: COLONOSCOPY;  Surgeon: Viktor Lopez MD;  Location: [facility] ENDOSCOPY    COLONOSCOPY  2021    Velvetrolanda, Normal, rpt 10 yrs    EGD  2021    Lenny Favors celiac/hpylori    HERNIA SURGERY      OTHER  WISDOM TEETH    OTHER SURGICAL HISTORY      gallbladder sx    TONSILLECTOMY      VENTRAL HERNIA REPAIR N/A 2015    Procedure:  HERNIA VENTRAL REPAIR;  Surgeon: Angus Leonard MD;  Location: [facility] MAIN OR         Social History     Socioeconomic History    Marital status:    Tobacco Use    Smoking status: Never    Smokeless tobacco: Never   Vaping Use    Vaping Use: Never used   Substance and Sexual Activity    Alcohol use: No     Alcohol/week: 0.0 standard drinks of alcohol    Drug use: No   Other Topics Concern    Caffeine Concern Yes     Comment: 12 ounces daily     Exercise Yes     Comment: active          REVIEW OF SYSTEMS: GENERAL: normal appetite  SKIN: no rashes or abnormal skin lesions  HEENT: See HPI  LUNGS: denies shortness of breath or wheezing, See HPI  CARDIOVASCULAR: denies chest pain or palpitations   GI: denies N/V/C or abdominal pain  NEURO: Denies headaches    EXAM:   General: Alert, Well-appearing, and In no acute distress  Respiratory:   Speaking in full sentences comfortably  Normal work of breathing  No cough during visit  Head: Normocephalic  Nose: No obvious nasal discharge. Skin: No obvious rashes or lesions from what observed. No results found for this or any previous visit (from the past 24 hour(s)). ASSESSMENT AND PLAN:   Toby Ernandez is a 34year old female who presents with symptoms that are consistent with    ASSESSMENT:   Uti symptoms  (primary encounter diagnosis)  Acute cystitis without hematuria    PLAN:   Symptoms are consistent with UTI. Pt states unable to go in to lab today for urine testing. Will treat empirically with Meds as below. See patient Instructions. Advised to seek in person evaluation for new or worsening symptoms or if not improving over the next 2 days. Meds & Refills for this Visit:  Requested Prescriptions     Signed Prescriptions Disp Refills    nitrofurantoin monohydrate macro (MACROBID) 100 MG Oral Cap 10 capsule 0     Sig: Take 1 capsule (100 mg total) by mouth 2 (two) times daily for 5 days. Risks, benefits, and side effects of medication explained and discussed. Patient Instructions   Push fluids  Antibiotic as prescribed  Follow up in person for new or worsening symptoms or if symptoms not improving the next 2 days. The patient indicates understanding of these issues and agrees to the plan. The patient is asked to return if sx's persist or worsen. Toby Ernandez understands video visit evaluation is not a substitute for face-to-face examination or emergency care.  Patient advised to go to ER or call 911 for worsening symptoms or acute distress.

## 2023-08-19 ENCOUNTER — HOSPITAL ENCOUNTER (EMERGENCY)
Facility: HOSPITAL | Age: 38
Discharge: HOME | End: 2023-08-19
Attending: EMERGENCY MEDICINE
Payer: COMMERCIAL

## 2023-08-19 ENCOUNTER — APPOINTMENT (OUTPATIENT)
Dept: CT IMAGING | Facility: HOSPITAL | Age: 38
End: 2023-08-19
Attending: EMERGENCY MEDICINE
Payer: COMMERCIAL

## 2023-08-19 VITALS
BODY MASS INDEX: 34.93 KG/M2 | HEIGHT: 61 IN | RESPIRATION RATE: 16 BRPM | SYSTOLIC BLOOD PRESSURE: 115 MMHG | DIASTOLIC BLOOD PRESSURE: 78 MMHG | OXYGEN SATURATION: 97 % | TEMPERATURE: 97 F | HEART RATE: 86 BPM | WEIGHT: 185 LBS

## 2023-08-19 DIAGNOSIS — R10.9 ABDOMINAL PAIN OF UNKNOWN ETIOLOGY: Primary | ICD-10-CM

## 2023-08-19 LAB
ALBUMIN SERPL-MCNC: 3.9 G/DL (ref 3.4–5)
ALBUMIN/GLOB SERPL: 1 {RATIO} (ref 1–2)
ALP LIVER SERPL-CCNC: 89 U/L
ALT SERPL-CCNC: 33 U/L
ANION GAP SERPL CALC-SCNC: 4 MMOL/L (ref 0–18)
AST SERPL-CCNC: 27 U/L (ref 15–37)
B-HCG UR QL: NEGATIVE
BASOPHILS # BLD AUTO: 0.04 X10(3) UL (ref 0–0.2)
BASOPHILS NFR BLD AUTO: 0.4 %
BILIRUB SERPL-MCNC: 0.4 MG/DL (ref 0.1–2)
BILIRUB UR QL STRIP.AUTO: NEGATIVE
BUN BLD-MCNC: 7 MG/DL (ref 7–18)
CALCIUM BLD-MCNC: 9.3 MG/DL (ref 8.5–10.1)
CHLORIDE SERPL-SCNC: 106 MMOL/L (ref 98–112)
CLARITY UR REFRACT.AUTO: CLEAR
CO2 SERPL-SCNC: 26 MMOL/L (ref 21–32)
COLOR UR AUTO: YELLOW
CREAT BLD-MCNC: 1.02 MG/DL
EGFRCR SERPLBLD CKD-EPI 2021: 71 ML/MIN/1.73M2 (ref 60–?)
EOSINOPHIL # BLD AUTO: 0.03 X10(3) UL (ref 0–0.7)
EOSINOPHIL NFR BLD AUTO: 0.3 %
ERYTHROCYTE [DISTWIDTH] IN BLOOD BY AUTOMATED COUNT: 13.5 %
GLOBULIN PLAS-MCNC: 3.9 G/DL (ref 2.8–4.4)
GLUCOSE BLD-MCNC: 96 MG/DL (ref 70–99)
GLUCOSE UR STRIP.AUTO-MCNC: NORMAL MG/DL
HCT VFR BLD AUTO: 43.5 %
HGB BLD-MCNC: 14.6 G/DL
IMM GRANULOCYTES # BLD AUTO: 0.04 X10(3) UL (ref 0–1)
IMM GRANULOCYTES NFR BLD: 0.4 %
KETONES UR STRIP.AUTO-MCNC: NEGATIVE MG/DL
LEUKOCYTE ESTERASE UR QL STRIP.AUTO: NEGATIVE
LIPASE SERPL-CCNC: 49 U/L (ref 13–75)
LYMPHOCYTES # BLD AUTO: 1.84 X10(3) UL (ref 1–4)
LYMPHOCYTES NFR BLD AUTO: 17.1 %
MCH RBC QN AUTO: 27.9 PG (ref 26–34)
MCHC RBC AUTO-ENTMCNC: 33.6 G/DL (ref 31–37)
MCV RBC AUTO: 83 FL
MONOCYTES # BLD AUTO: 0.42 X10(3) UL (ref 0.1–1)
MONOCYTES NFR BLD AUTO: 3.9 %
NEUTROPHILS # BLD AUTO: 8.38 X10 (3) UL (ref 1.5–7.7)
NEUTROPHILS # BLD AUTO: 8.38 X10(3) UL (ref 1.5–7.7)
NEUTROPHILS NFR BLD AUTO: 77.9 %
NITRITE UR QL STRIP.AUTO: NEGATIVE
OSMOLALITY SERPL CALC.SUM OF ELEC: 280 MOSM/KG (ref 275–295)
PH UR STRIP.AUTO: 5.5 [PH] (ref 5–8)
PLATELET # BLD AUTO: 357 10(3)UL (ref 150–450)
POTASSIUM SERPL-SCNC: 4 MMOL/L (ref 3.5–5.1)
PROT SERPL-MCNC: 7.8 G/DL (ref 6.4–8.2)
PROT UR STRIP.AUTO-MCNC: 20 MG/DL
RBC # BLD AUTO: 5.24 X10(6)UL
RBC UR QL AUTO: NEGATIVE
SODIUM SERPL-SCNC: 136 MMOL/L (ref 136–145)
SP GR UR STRIP.AUTO: 1.02 (ref 1–1.03)
UROBILINOGEN UR STRIP.AUTO-MCNC: NORMAL MG/DL
WBC # BLD AUTO: 10.8 X10(3) UL (ref 4–11)

## 2023-08-19 PROCEDURE — 96375 TX/PRO/DX INJ NEW DRUG ADDON: CPT

## 2023-08-19 PROCEDURE — 99284 EMERGENCY DEPT VISIT MOD MDM: CPT

## 2023-08-19 PROCEDURE — 80053 COMPREHEN METABOLIC PANEL: CPT | Performed by: EMERGENCY MEDICINE

## 2023-08-19 PROCEDURE — 93005 ELECTROCARDIOGRAM TRACING: CPT

## 2023-08-19 PROCEDURE — 81001 URINALYSIS AUTO W/SCOPE: CPT

## 2023-08-19 PROCEDURE — 81001 URINALYSIS AUTO W/SCOPE: CPT | Performed by: EMERGENCY MEDICINE

## 2023-08-19 PROCEDURE — 96374 THER/PROPH/DIAG INJ IV PUSH: CPT

## 2023-08-19 PROCEDURE — 83690 ASSAY OF LIPASE: CPT | Performed by: EMERGENCY MEDICINE

## 2023-08-19 PROCEDURE — 81025 URINE PREGNANCY TEST: CPT

## 2023-08-19 PROCEDURE — 85025 COMPLETE CBC W/AUTO DIFF WBC: CPT

## 2023-08-19 PROCEDURE — 85025 COMPLETE CBC W/AUTO DIFF WBC: CPT | Performed by: EMERGENCY MEDICINE

## 2023-08-19 PROCEDURE — 80053 COMPREHEN METABOLIC PANEL: CPT

## 2023-08-19 PROCEDURE — 99285 EMERGENCY DEPT VISIT HI MDM: CPT

## 2023-08-19 PROCEDURE — 74177 CT ABD & PELVIS W/CONTRAST: CPT | Performed by: EMERGENCY MEDICINE

## 2023-08-19 PROCEDURE — S0028 INJECTION, FAMOTIDINE, 20 MG: HCPCS | Performed by: EMERGENCY MEDICINE

## 2023-08-19 PROCEDURE — 93010 ELECTROCARDIOGRAM REPORT: CPT

## 2023-08-19 PROCEDURE — 83690 ASSAY OF LIPASE: CPT

## 2023-08-19 RX ORDER — ONDANSETRON 4 MG/1
4 TABLET, ORALLY DISINTEGRATING ORAL EVERY 4 HOURS PRN
Qty: 10 TABLET | Refills: 0 | Status: SHIPPED | OUTPATIENT
Start: 2023-08-19 | End: 2023-08-26

## 2023-08-19 RX ORDER — FAMOTIDINE 10 MG/ML
20 INJECTION, SOLUTION INTRAVENOUS ONCE
Status: COMPLETED | OUTPATIENT
Start: 2023-08-19 | End: 2023-08-19

## 2023-08-19 RX ORDER — ONDANSETRON 2 MG/ML
4 INJECTION INTRAMUSCULAR; INTRAVENOUS ONCE
Status: COMPLETED | OUTPATIENT
Start: 2023-08-19 | End: 2023-08-19

## 2023-08-19 RX ORDER — PANTOPRAZOLE SODIUM 40 MG/1
40 TABLET, DELAYED RELEASE ORAL DAILY
Qty: 30 TABLET | Refills: 0 | Status: SHIPPED | OUTPATIENT
Start: 2023-08-19 | End: 2023-09-18

## 2023-08-19 RX ORDER — KETOROLAC TROMETHAMINE 15 MG/ML
15 INJECTION, SOLUTION INTRAMUSCULAR; INTRAVENOUS ONCE
Status: COMPLETED | OUTPATIENT
Start: 2023-08-19 | End: 2023-08-19

## 2023-08-19 NOTE — DISCHARGE INSTRUCTIONS
Clear liquids slowly advance your diet follow-up with your primary care physician if you have increasing pain discomfort nausea vomiting you need to return to the emergency room. You were seen in the emergency room in a limited time. There is a possibility that although we do not see any acute process at this present time that things can change with time. Is therefore imperative that you follow-up with primary care physician for close follow-up. If there is any significant progression of your pain  or other symptoms you to return immediately to the emergency room.

## 2023-08-19 NOTE — ED INITIAL ASSESSMENT (HPI)
Pt. C/o upper mid abdominal pain that began this morning. Also c/o nausea, denies vomiting and diarrhea.

## 2023-08-20 LAB
ATRIAL RATE: 82 BPM
P AXIS: 57 DEGREES
P-R INTERVAL: 160 MS
Q-T INTERVAL: 364 MS
QRS DURATION: 78 MS
QTC CALCULATION (BEZET): 425 MS
R AXIS: -4 DEGREES
T AXIS: 39 DEGREES
VENTRICULAR RATE: 82 BPM

## 2023-08-21 ENCOUNTER — OFFICE VISIT (OUTPATIENT)
Dept: INTERNAL MEDICINE CLINIC | Facility: CLINIC | Age: 38
End: 2023-08-21
Payer: COMMERCIAL

## 2023-08-21 VITALS
SYSTOLIC BLOOD PRESSURE: 108 MMHG | DIASTOLIC BLOOD PRESSURE: 70 MMHG | BODY MASS INDEX: 35.12 KG/M2 | HEIGHT: 61 IN | HEART RATE: 79 BPM | OXYGEN SATURATION: 99 % | RESPIRATION RATE: 18 BRPM | WEIGHT: 186 LBS

## 2023-08-21 DIAGNOSIS — E88.81 INSULIN RESISTANCE: ICD-10-CM

## 2023-08-21 DIAGNOSIS — E53.8 LOW VITAMIN B12 LEVEL: ICD-10-CM

## 2023-08-21 DIAGNOSIS — Z51.81 ENCOUNTER FOR THERAPEUTIC DRUG MONITORING: Primary | ICD-10-CM

## 2023-08-21 DIAGNOSIS — E66.01 CLASS 2 SEVERE OBESITY WITH SERIOUS COMORBIDITY AND BODY MASS INDEX (BMI) OF 37.0 TO 37.9 IN ADULT, UNSPECIFIED OBESITY TYPE: ICD-10-CM

## 2023-08-21 PROCEDURE — 99214 OFFICE O/P EST MOD 30 MIN: CPT | Performed by: NURSE PRACTITIONER

## 2023-08-21 PROCEDURE — 3008F BODY MASS INDEX DOCD: CPT | Performed by: NURSE PRACTITIONER

## 2023-08-21 PROCEDURE — 3074F SYST BP LT 130 MM HG: CPT | Performed by: NURSE PRACTITIONER

## 2023-08-21 PROCEDURE — 3078F DIAST BP <80 MM HG: CPT | Performed by: NURSE PRACTITIONER

## 2023-08-21 RX ORDER — LIRAGLUTIDE 6 MG/ML
3 INJECTION, SOLUTION SUBCUTANEOUS DAILY
Qty: 45 ML | Refills: 1 | Status: SHIPPED | OUTPATIENT
Start: 2023-08-21

## 2023-08-23 ENCOUNTER — PATIENT MESSAGE (OUTPATIENT)
Dept: FAMILY MEDICINE CLINIC | Facility: CLINIC | Age: 38
End: 2023-08-23

## 2023-08-23 ENCOUNTER — LAB ENCOUNTER (OUTPATIENT)
Dept: LAB | Age: 38
End: 2023-08-23
Attending: FAMILY MEDICINE
Payer: COMMERCIAL

## 2023-08-23 DIAGNOSIS — Z79.899 NEED FOR PROPHYLACTIC CHEMOTHERAPY: Primary | ICD-10-CM

## 2023-08-23 LAB
ALT SERPL-CCNC: 30 U/L
AST SERPL-CCNC: 21 U/L (ref 15–37)
B-HCG SERPL-ACNC: <1 MIU/ML
TRIGL SERPL-MCNC: 197 MG/DL (ref 30–149)

## 2023-08-23 PROCEDURE — 84702 CHORIONIC GONADOTROPIN TEST: CPT

## 2023-08-23 PROCEDURE — 84460 ALANINE AMINO (ALT) (SGPT): CPT

## 2023-08-23 PROCEDURE — 84450 TRANSFERASE (AST) (SGOT): CPT

## 2023-08-23 PROCEDURE — 84478 ASSAY OF TRIGLYCERIDES: CPT

## 2023-08-23 NOTE — TELEPHONE ENCOUNTER
From: Jesse Padilla  To: Dalia Bess PA-C  Sent: 8/23/2023 11:28 AM CDT  Subject: Repeat BMP    Hi Tristan Christian,    I ended up having a CMP done 8/19/23. Kidney values look back to normal.    You can delete the BMP order if you want, unless you feel I should check it again between appointments.     Thanks,  Dario Wright

## 2023-08-29 ENCOUNTER — TELEMEDICINE (OUTPATIENT)
Dept: INTERNAL MEDICINE CLINIC | Facility: CLINIC | Age: 38
End: 2023-08-29
Payer: COMMERCIAL

## 2023-08-29 DIAGNOSIS — E66.9 OBESITY, CLASS II, BMI 35-39.9: ICD-10-CM

## 2023-08-29 DIAGNOSIS — E88.81 INSULIN RESISTANCE: ICD-10-CM

## 2023-08-29 DIAGNOSIS — E28.2 PCOS (POLYCYSTIC OVARIAN SYNDROME): Primary | ICD-10-CM

## 2023-08-29 PROCEDURE — 97803 MED NUTRITION INDIV SUBSEQ: CPT | Performed by: DIETITIAN, REGISTERED

## 2023-09-25 ENCOUNTER — HOSPITAL ENCOUNTER (OUTPATIENT)
Dept: MAMMOGRAPHY | Age: 38
Discharge: HOME | End: 2023-09-25
Attending: FAMILY MEDICINE
Payer: COMMERCIAL

## 2023-09-25 DIAGNOSIS — Z12.31 ENCOUNTER FOR SCREENING MAMMOGRAM FOR MALIGNANT NEOPLASM OF BREAST: ICD-10-CM

## 2023-09-25 PROCEDURE — 77067 SCR MAMMO BI INCL CAD: CPT | Performed by: FAMILY MEDICINE

## 2023-09-25 PROCEDURE — 77063 BREAST TOMOSYNTHESIS BI: CPT | Performed by: FAMILY MEDICINE

## 2023-10-14 ENCOUNTER — MED REC SCAN ONLY (OUTPATIENT)
Dept: FAMILY MEDICINE CLINIC | Facility: CLINIC | Age: 38
End: 2023-10-14

## 2023-10-18 ENCOUNTER — APPOINTMENT (OUTPATIENT)
Dept: LAB | Age: 38
End: 2023-10-18
Attending: PHYSICIAN ASSISTANT
Payer: COMMERCIAL

## 2023-10-18 ENCOUNTER — LAB ENCOUNTER (OUTPATIENT)
Dept: LAB | Age: 38
End: 2023-10-18
Attending: PHYSICIAN ASSISTANT
Payer: COMMERCIAL

## 2023-10-18 DIAGNOSIS — Z79.899 NEED FOR PROPHYLACTIC CHEMOTHERAPY: Primary | ICD-10-CM

## 2023-10-18 LAB
ALT SERPL-CCNC: 23 U/L
AST SERPL-CCNC: 16 U/L (ref 15–37)
B-HCG SERPL-ACNC: <1 MIU/ML
TRIGL SERPL-MCNC: 139 MG/DL (ref 30–149)

## 2023-10-18 PROCEDURE — 84702 CHORIONIC GONADOTROPIN TEST: CPT

## 2023-10-18 PROCEDURE — 84460 ALANINE AMINO (ALT) (SGPT): CPT

## 2023-10-18 PROCEDURE — 84450 TRANSFERASE (AST) (SGOT): CPT

## 2023-10-18 PROCEDURE — 84478 ASSAY OF TRIGLYCERIDES: CPT

## 2023-11-07 ENCOUNTER — HOSPITAL ENCOUNTER (OUTPATIENT)
Dept: CV DIAGNOSTICS | Age: 38
Discharge: HOME | End: 2023-11-07
Attending: FAMILY MEDICINE
Payer: COMMERCIAL

## 2023-11-07 DIAGNOSIS — Z82.49 FAMILY HISTORY OF AORTIC ANEURYSM: ICD-10-CM

## 2023-11-07 PROCEDURE — 93306 TTE W/DOPPLER COMPLETE: CPT | Performed by: FAMILY MEDICINE

## 2023-11-20 ENCOUNTER — OFFICE VISIT (OUTPATIENT)
Dept: INTERNAL MEDICINE CLINIC | Facility: CLINIC | Age: 38
End: 2023-11-20
Payer: COMMERCIAL

## 2023-11-20 ENCOUNTER — LAB ENCOUNTER (OUTPATIENT)
Dept: LAB | Age: 38
End: 2023-11-20
Attending: PHYSICIAN ASSISTANT
Payer: COMMERCIAL

## 2023-11-20 VITALS
OXYGEN SATURATION: 97 % | RESPIRATION RATE: 18 BRPM | BODY MASS INDEX: 33.42 KG/M2 | HEIGHT: 61 IN | WEIGHT: 177 LBS | DIASTOLIC BLOOD PRESSURE: 74 MMHG | SYSTOLIC BLOOD PRESSURE: 116 MMHG | HEART RATE: 97 BPM

## 2023-11-20 DIAGNOSIS — E88.819 INSULIN RESISTANCE: ICD-10-CM

## 2023-11-20 DIAGNOSIS — Z79.899 NEED FOR PROPHYLACTIC CHEMOTHERAPY: Primary | ICD-10-CM

## 2023-11-20 DIAGNOSIS — E66.01 CLASS 2 SEVERE OBESITY WITH SERIOUS COMORBIDITY AND BODY MASS INDEX (BMI) OF 37.0 TO 37.9 IN ADULT, UNSPECIFIED OBESITY TYPE: ICD-10-CM

## 2023-11-20 DIAGNOSIS — Z51.81 ENCOUNTER FOR THERAPEUTIC DRUG MONITORING: Primary | ICD-10-CM

## 2023-11-20 DIAGNOSIS — E28.2 PCOS (POLYCYSTIC OVARIAN SYNDROME): ICD-10-CM

## 2023-11-20 LAB
ALT SERPL-CCNC: 19 U/L
AST SERPL-CCNC: 10 U/L (ref 15–37)
B-HCG SERPL-ACNC: <1 MIU/ML
TRIGL SERPL-MCNC: 103 MG/DL (ref 30–149)

## 2023-11-20 PROCEDURE — 84450 TRANSFERASE (AST) (SGOT): CPT

## 2023-11-20 PROCEDURE — 3074F SYST BP LT 130 MM HG: CPT | Performed by: NURSE PRACTITIONER

## 2023-11-20 PROCEDURE — 84702 CHORIONIC GONADOTROPIN TEST: CPT

## 2023-11-20 PROCEDURE — 3008F BODY MASS INDEX DOCD: CPT | Performed by: NURSE PRACTITIONER

## 2023-11-20 PROCEDURE — 84478 ASSAY OF TRIGLYCERIDES: CPT

## 2023-11-20 PROCEDURE — 84460 ALANINE AMINO (ALT) (SGPT): CPT

## 2023-11-20 PROCEDURE — 3078F DIAST BP <80 MM HG: CPT | Performed by: NURSE PRACTITIONER

## 2023-11-20 PROCEDURE — 99213 OFFICE O/P EST LOW 20 MIN: CPT | Performed by: NURSE PRACTITIONER

## 2023-12-05 ENCOUNTER — TELEPHONE (OUTPATIENT)
Dept: INTERNAL MEDICINE CLINIC | Facility: CLINIC | Age: 38
End: 2023-12-05

## 2023-12-06 NOTE — TELEPHONE ENCOUNTER
Approved    Prior authorization approved Case ID: [id number]      Payer: [address]    [phone]    [phone]   CaseId:[id number];Status:Approved; Review Type:Prior Auth; Coverage Start Date:11/05/2023; Coverage End Date:12/04/2024;    Approval Details    Authorized from November 5, 2023 to December 4, 2024      Electronic appeal: Not supported   View History    Medication Being Authorized     SAXENDA 18 MG/3ML Subcutaneous Solution Pen-injector

## 2023-12-18 ENCOUNTER — PATIENT MESSAGE (OUTPATIENT)
Dept: INTERNAL MEDICINE CLINIC | Facility: CLINIC | Age: 38
End: 2023-12-18

## 2023-12-18 DIAGNOSIS — E88.819 INSULIN RESISTANCE: ICD-10-CM

## 2023-12-18 DIAGNOSIS — Z51.81 ENCOUNTER FOR THERAPEUTIC DRUG MONITORING: Primary | ICD-10-CM

## 2023-12-18 DIAGNOSIS — E66.01 CLASS 2 SEVERE OBESITY WITH SERIOUS COMORBIDITY AND BODY MASS INDEX (BMI) OF 37.0 TO 37.9 IN ADULT, UNSPECIFIED OBESITY TYPE  (HCC): ICD-10-CM

## 2023-12-18 NOTE — TELEPHONE ENCOUNTER
From: Noble Awad  To: Carlos Kaur  Sent: 12/18/2023 2:08 PM CST  Subject: Weight Loss Medication    Hello,    I've been trying to get Saxenda filled for awhile now. The pharmacy still hasn't gotten it. Do you think it's worth considering switching to Zepbound? We discussed it briefly at the last appointment. Thank you!   Dayan Hoffmann

## 2023-12-20 ENCOUNTER — LAB ENCOUNTER (OUTPATIENT)
Dept: LAB | Age: 38
End: 2023-12-20
Attending: PHYSICIAN ASSISTANT
Payer: COMMERCIAL

## 2023-12-20 DIAGNOSIS — Z79.899 NEED FOR PROPHYLACTIC CHEMOTHERAPY: Primary | ICD-10-CM

## 2023-12-20 LAB
ALT SERPL-CCNC: 21 U/L
AST SERPL-CCNC: 21 U/L (ref 15–37)
HCG SERPL QL: NEGATIVE
TRIGL SERPL-MCNC: 144 MG/DL (ref 30–149)

## 2023-12-20 PROCEDURE — 84703 CHORIONIC GONADOTROPIN ASSAY: CPT

## 2023-12-20 PROCEDURE — 84478 ASSAY OF TRIGLYCERIDES: CPT

## 2023-12-20 PROCEDURE — 84460 ALANINE AMINO (ALT) (SGPT): CPT

## 2023-12-20 PROCEDURE — 84450 TRANSFERASE (AST) (SGOT): CPT

## 2023-12-24 RX ORDER — TIRZEPATIDE 2.5 MG/.5ML
2.5 INJECTION, SOLUTION SUBCUTANEOUS WEEKLY
Qty: 2 ML | Refills: 1 | Status: SHIPPED | OUTPATIENT
Start: 2023-12-24

## 2023-12-26 ENCOUNTER — TELEPHONE (OUTPATIENT)
Dept: INTERNAL MEDICINE CLINIC | Facility: CLINIC | Age: 38
End: 2023-12-26

## 2023-12-26 NOTE — TELEPHONE ENCOUNTER
Sosa Hayes (Bryant: [id number])    Sosa Hayes (Bryant: [id number])  Zepbound 2.5MG/0.5ML pen-injectors    PA initiate on CMM, need to attach \"note\" on (Key: [id number]).  Please advise.    Xiomara, please sent me WebEx, when you working on this pt.

## 2024-01-05 ENCOUNTER — LAB ENCOUNTER (OUTPATIENT)
Dept: LAB | Age: 39
End: 2024-01-05
Attending: PHYSICIAN ASSISTANT
Payer: COMMERCIAL

## 2024-01-05 DIAGNOSIS — Z79.899 NEED FOR PROPHYLACTIC CHEMOTHERAPY: Primary | ICD-10-CM

## 2024-01-05 LAB — B-HCG SERPL-ACNC: <1 MIU/ML

## 2024-01-05 PROCEDURE — 84702 CHORIONIC GONADOTROPIN TEST: CPT

## 2024-01-10 ENCOUNTER — HOSPITAL ENCOUNTER (OUTPATIENT)
Dept: GENERAL RADIOLOGY | Facility: HOSPITAL | Age: 39
Discharge: HOME | End: 2024-01-10
Attending: FAMILY MEDICINE
Payer: COMMERCIAL

## 2024-01-10 ENCOUNTER — OFFICE VISIT (OUTPATIENT)
Dept: FAMILY MEDICINE CLINIC | Facility: CLINIC | Age: 39
End: 2024-01-10
Payer: COMMERCIAL

## 2024-01-10 VITALS
WEIGHT: 178 LBS | SYSTOLIC BLOOD PRESSURE: 100 MMHG | HEART RATE: 83 BPM | BODY MASS INDEX: 33.61 KG/M2 | DIASTOLIC BLOOD PRESSURE: 80 MMHG | HEIGHT: 61 IN | TEMPERATURE: 97 F | RESPIRATION RATE: 16 BRPM
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
## 2024-01-10 PROCEDURE — 3074F SYST BP LT 130 MM HG: CPT | Performed by: FAMILY MEDICINE

## 2024-01-10 PROCEDURE — 3008F BODY MASS INDEX DOCD: CPT | Performed by: FAMILY MEDICINE

## 2024-01-10 PROCEDURE — 70150 X-RAY EXAM OF FACIAL BONES: CPT | Performed by: FAMILY MEDICINE

## 2024-01-10 PROCEDURE — 3079F DIAST BP 80-89 MM HG: CPT | Performed by: FAMILY MEDICINE

## 2024-01-10 PROCEDURE — 99214 OFFICE O/P EST MOD 30 MIN: CPT | Performed by: FAMILY MEDICINE

## 2024-01-10 PROCEDURE — 70200 X-RAY EXAM OF EYE SOCKETS: CPT | Performed by: FAMILY MEDICINE

## 2024-01-10 NOTE — PROGRESS NOTES
[facility] Family Medicine Office Note  Chief Complaint:   Chief Complaint   Patient presents with    Fall     Had a fall this morning , on left side        HPI:   This is a 38 year old female coming in for recent fall.  The patient states that she fell at 10:00 this morning she states that she fell forward on her car door hit the left side of her face she has not had any headache has not had any nausea or vomiting has not had any changes in vision      Past Medical History:   Diagnosis Date    Anesthesia complication     HARD TO AWAKEN    Asthma     due bouts of flu    Bipolar I disorder, single manic episode (HCC) 2012    Neurocardiogenic syncope     had as teen, outgrew    Polycystic disease, ovaries     PONV (postoperative nausea and vomiting)     Post-cholecystectomy syndrome      Past Surgical History:   Procedure Laterality Date          CHOLECYSTECTOMY  \    COLONOSCOPY N/A 10/15/2015    Procedure: COLONOSCOPY;  Surgeon: Emily Gandhi MD;  Location:  ENDOSCOPY    COLONOSCOPY  2021    Trisha, Normal, rpt 10 yrs    EGD  2021    Trisha, Neg celiac/hpylori    HERNIA SURGERY      OTHER  WISDOM TEETH    OTHER SURGICAL HISTORY      gallbladder sx    TONSILLECTOMY      VENTRAL HERNIA REPAIR N/A 2015    Procedure: HERNIA VENTRAL REPAIR;  Surgeon: Arnaldo Jefferson MD;  Location:  MAIN OR     Social History:  Social History     Socioeconomic History    Marital status:    Tobacco Use    Smoking status: Never    Smokeless tobacco: Never   Vaping Use    Vaping Use: Never used   Substance and Sexual Activity    Alcohol use: No     Alcohol/week: 0.0 standard drinks of alcohol    Drug use: No   Other Topics Concern    Caffeine Concern Yes     Comment: 12 ounces daily     Exercise Yes     Comment: active      Family History:  Family History   Problem Relation Age of Onset    Cancer Paternal Grandfather     Cancer Paternal Grandmother     Diabetes Paternal  Grandmother         type 2    Other (psoriatic arthritis) Paternal Grandmother     Anxiety Brother     Other (asperberger's) Brother     ADHD Mother     OCD Mother     Other (hip replacement) Mother     Bipolar Disorder Maternal Grandmother     Stroke Maternal Grandmother     Heart Disorder Maternal Grandmother     Bipolar Disorder Brother     Diabetes Father         type 2    Diabetes Maternal Grandfather         type 2    Pulmonary Disease Maternal Grandfather      Allergies:  Allergies   Allergen Reactions    Codeine HALLUCINATION     Auditory hallucinations    Propoxyphene NAUSEA AND VOMITING    Sulfa Antibiotics NAUSEA AND VOMITING     GI upset    Biaxin [Clarithromycin] OTHER (SEE COMMENTS)     GI upset      Erythromycin RASH     Derivatives GI upset        Lactose OTHER (SEE COMMENTS)     Current Meds:  Current Outpatient Medications   Medication Sig Dispense Refill    Tirzepatide-Weight Management (ZEPBOUND) 2.5 MG/0.5ML Subcutaneous Solution Auto-injector Inject 2.5 mg into the skin once a week. 2 mL 1    metFORMIN  MG Oral Tablet 24 Hr Take 1 tablet (500 mg total) by mouth daily. 90 tablet 1    Meloxicam 15 MG Oral Tab Take 1 tablet (15 mg total) by mouth daily. 30 tablet 3    amphetamine-dextroamphetamine ER 10 MG Oral Capsule SR 24 Hr 5 mg.      mupirocin 2 % External Ointment       azelastine 0.1 % Nasal Solution 2 sprays by Nasal route 2 (two) times daily.      buPROPion  MG Oral Tablet 12 Hr Take 1 tablet (150 mg total) by mouth daily.      fluticasone propionate 50 MCG/ACT Nasal Suspension 2 sprays by Nasal route daily.      albuterol 108 (90 Base) MCG/ACT Inhalation Aero Soln As Directed.      cyclobenzaprine 5 MG Oral Tab TAKE ONE TABLET BY MOUTH EVERY NIGHT AT BEDTIME OR TWICE DAILY AS NEEDED FOR NECK PAIN / SPASMS      ABSORICA 40 MG Oral Cap Take 1.25 capsules (50 mg total) by mouth daily.      Potassium 99 MG Oral Tab Take by mouth.      Magnesium 100 MG Oral Cap Take by mouth.       Glucosamine-Chondroit-Collagen (CVS GLUCO-CHONDROIT PLUS UC-II) 125-100-10 MG Oral Tab Take 125 mg by mouth 2 (two) times a day.      SUMAtriptan Succinate (IMITREX) 100 MG Oral Tab 1 po once.  Repeat once after 2 hours if needed.  Max 2 per 24 hours. 9 tablet 1    diazePAM 2 MG Oral Tab as needed.      amphetamine-dextroamphetamine 10 MG Oral Tab 5mg bid      BIOTIN OR Take by mouth.      Omega-3 Fatty Acids (OMEGA-3 FISH OIL OR) Take by mouth.      Vitamin D3 1000 units Oral Tab Take 1 tablet (1,000 Units total) by mouth daily.        Counseling given: Not Answered       REVIEW OF SYSTEMS:   Consitutional: No fevers, chills, sweats  Eye: No recent visual problems  ENMT: No ear pain nasal congestion sore throat  Respiratory: No shortness of breath, cough  Cardiovascular: No chest pain palpitations syncope  Gastrointestinal: No nausea vomiting diarrhea  Genitourinary: No hematuria  Hema/Lymph no bruising tendency, swollen lymph glands  Endocrine: Negative for excessive thirst excessive hunger  Musculoskeletal: No back pain neck pain joint pain muscle pain decreased range of motion  Integumentary: Bruising as well as swelling of the left cheek    Medical, surgical, family, and social histories were reviewed      EXAM:   VITALS:   Vitals:    01/10/24 1125   BP: 100/80   Pulse: 83   Resp: 16   Temp: 97.2 °F (36.2 °C)      VITALS: Vitals reviewed   GENERAL: well developed, well nourished, in no apparent distress  SKIN: no rashes, no suspicious lesions: Cool and Dry  HEENT: atraumatic, normocephalic, ears and throat are clear bilateral tympanic membranes lucent nonbulging intact nose without bleeding purulent discharge or septal hematoma.  there is bruising as well as swelling of the left cheek  EYES: Pupils equal round and reactive.  Extraocular motions intact no scleral icterus no injection or drainage  THROAT without erythema tonsillar hypertrophy or exudate.  Uvula midline airway patent  NECK: trachea midline.  No   lymphadenopathy supple nontender no meningeal signs   LUNGS: clear to auscultation sounds equal bilaterally no wheezes rales or rhonchi  CARDIO: Regular rate and rhythm without murmurs gallops or rubs  NEURO: Awake and alert.  Cranial nerves II through XII intact motor and sensory grossly within normal limits.  5 out of 5 muscle strength in all muscle groups.  Normal speech.         ASSESSMENT AND PLAN:   1. Fall, initial encounter  - XR FACIAL BONES, COMPLETE (MIN 3 VIEWS) (CPT=70150); Future  - XR EYE ORBITS, COMPLETE (MIN 4 VIEWS) (CPT=70200); Future  I did discuss with the patient at this time would suggest for her to use ibuprofen 600 mg as well as an ice pack on the area.  I did discuss would like for her to have x-rays completed she was asked to follow-up in the emergency room if she does have any episodes of nausea vomiting headache changes in vision.    Meds & Refills for this Visit:  Requested Prescriptions      No prescriptions requested or ordered in this encounter       Health Maintenance:  Health Maintenance Due   Topic Date Due    Asthma Action Plan  Never done    Asthma Control Test  Never done    Pneumococcal Vaccine: Birth to 64yrs (1 of 2 - PCV) Never done    Influenza Vaccine (1) 10/01/2023    Annual Depression Screening  01/01/2024    Pap Smear  05/25/2024       Patient/Caregiver Education: Patient/Caregiver Education: There are no barriers to learning. Medical education done.   Outcome: Patient verbalizes understanding. Patient is notified to call with any questions, complications, allergies, or worsening or changing symptoms.  Patient is to call with any side effects or complications from the treatments as a result of today.     Problem List:  Patient Active Problem List   Diagnosis    Unspecified asthma    Other allergy, other than to medicinal agents    Carpal tunnel syndrome    Incarcerated ventral hernia    Uncomplicated asthma    PCOS (polycystic ovarian syndrome)    Gastroesophageal  reflux disease    Umbilical hernia    S/P repair of ventral hernia    Post-cholecystectomy syndrome    Recurrent ventral hernia    De Quervain's tenosynovitis, left    Cervicalgia    Metabolic syndrome    Positive JHONATHAN (antinuclear antibody)    Facet syndrome, lumbar    Trigger point with back pain    Insomnia    Chronic left-sided low back pain without sciatica    Neuroforaminal stenosis of lumbar spine    Encounter for therapeutic drug monitoring    Class 2 severe obesity with serious comorbidity and body mass index (BMI) of 37.0 to 37.9 in adult  (HCC)    Trochanteric bursitis of left hip    Fibromyalgia         No follow-ups on file.     Ananda Head MD    Please note that portions of this note may have been completed with a voice recognition program. Efforts were made to edit the dictations but occasionally words are mis-transcribed.

## 2024-01-11 RX ORDER — METFORMIN HYDROCHLORIDE 500 MG/1
500 TABLET, EXTENDED RELEASE ORAL DAILY
Qty: 90 TABLET | Refills: 0 | Status: SHIPPED | OUTPATIENT
Start: 2024-01-11

## 2024-01-18 ENCOUNTER — OFFICE VISIT (OUTPATIENT)
Dept: FAMILY MEDICINE CLINIC | Facility: CLINIC | Age: 39
End: 2024-01-18
Payer: COMMERCIAL

## 2024-01-18 ENCOUNTER — LAB ENCOUNTER (OUTPATIENT)
Dept: LAB | Age: 39
End: 2024-01-18
Attending: FAMILY MEDICINE
Payer: COMMERCIAL

## 2024-01-18 VITALS
DIASTOLIC BLOOD PRESSURE: 64 MMHG | BODY MASS INDEX: 33.3 KG/M2 | SYSTOLIC BLOOD PRESSURE: 110 MMHG | WEIGHT: 176.38 LBS | HEART RATE: 78 BPM | TEMPERATURE: 97 F | RESPIRATION RATE: 16 BRPM | HEIGHT: 61 IN

## 2024-01-18 DIAGNOSIS — E28.2 PCOS (POLYCYSTIC OVARIAN SYNDROME): ICD-10-CM

## 2024-01-18 DIAGNOSIS — J45.20 MILD INTERMITTENT ASTHMA WITHOUT COMPLICATION: ICD-10-CM

## 2024-01-18 DIAGNOSIS — E28.2 PCOS (POLYCYSTIC OVARIAN SYNDROME): Primary | ICD-10-CM

## 2024-01-18 LAB
ANION GAP SERPL CALC-SCNC: 9 MMOL/L (ref 0–18)
BUN BLD-MCNC: 11 MG/DL (ref 9–23)
CALCIUM BLD-MCNC: 9 MG/DL (ref 8.5–10.1)
CHLORIDE SERPL-SCNC: 105 MMOL/L (ref 98–112)
CO2 SERPL-SCNC: 24 MMOL/L (ref 21–32)
CREAT BLD-MCNC: 1.04 MG/DL
EGFRCR SERPLBLD CKD-EPI 2021: 70 ML/MIN/1.73M2 (ref 60–?)
EST. AVERAGE GLUCOSE BLD GHB EST-MCNC: 108 MG/DL (ref 68–126)
FASTING STATUS PATIENT QL REPORTED: YES
GLUCOSE BLD-MCNC: 68 MG/DL (ref 70–99)
HBA1C MFR BLD: 5.4 % (ref ?–5.7)
OSMOLALITY SERPL CALC.SUM OF ELEC: 284 MOSM/KG (ref 275–295)
POTASSIUM SERPL-SCNC: 3.8 MMOL/L (ref 3.5–5.1)
SODIUM SERPL-SCNC: 138 MMOL/L (ref 136–145)

## 2024-01-18 PROCEDURE — 3074F SYST BP LT 130 MM HG: CPT | Performed by: FAMILY MEDICINE

## 2024-01-18 PROCEDURE — 83036 HEMOGLOBIN GLYCOSYLATED A1C: CPT

## 2024-01-18 PROCEDURE — 80048 BASIC METABOLIC PNL TOTAL CA: CPT

## 2024-01-18 PROCEDURE — 3008F BODY MASS INDEX DOCD: CPT | Performed by: FAMILY MEDICINE

## 2024-01-18 PROCEDURE — 99214 OFFICE O/P EST MOD 30 MIN: CPT | Performed by: FAMILY MEDICINE

## 2024-01-18 PROCEDURE — 3078F DIAST BP <80 MM HG: CPT | Performed by: FAMILY MEDICINE

## 2024-01-18 RX ORDER — DEXTROAMPHETAMINE SACCHARATE, AMPHETAMINE ASPARTATE MONOHYDRATE, DEXTROAMPHETAMINE SULFATE AND AMPHETAMINE SULFATE 1.25; 1.25; 1.25; 1.25 MG/1; MG/1; MG/1; MG/1
5 CAPSULE, EXTENDED RELEASE ORAL DAILY PRN
COMMUNITY
Start: 2023-12-31

## 2024-01-18 RX ORDER — METFORMIN HYDROCHLORIDE 500 MG/1
500 TABLET, EXTENDED RELEASE ORAL DAILY
Qty: 90 TABLET | Refills: 1 | Status: SHIPPED | OUTPATIENT
Start: 2024-01-18

## 2024-01-18 RX ORDER — PANTOPRAZOLE SODIUM 40 MG/1
TABLET, DELAYED RELEASE ORAL
COMMUNITY

## 2024-01-18 NOTE — PROGRESS NOTES
Sosa Hayes is a 38 year old female.  CHIEF COMPLAINT   Follow up on metformin  HPI:     History of PCOS - diagnosed about 2008.  Saw reproductive endocrine who was managing then referred back her.    Taking metformin  daily. No hx of gestational DM (was borderline with her last pregnancy) or NIDDM.  Gains more weight if she stops medication.   Normal menses typically.     Blanca Sabillon for weight loss.      Dr. Frazier - rheumatology for fibromyalgia.      Asthma control stable - only has issues when sick.  ACT score 23.    Migraines stable.  Mild and infrequent.      Finishing up accutane from derm.      Current Outpatient Medications   Medication Sig Dispense Refill    pantoprazole 40 MG Oral Tab EC       amphetamine-dextroamphetamine ER 5 MG Oral Capsule SR 24 Hr Take 1 capsule (5 mg total) by mouth daily as needed.      metFORMIN  MG Oral Tablet 24 Hr Take 1 tablet (500 mg total) by mouth daily. 90 tablet 1    Tirzepatide-Weight Management (ZEPBOUND) 2.5 MG/0.5ML Subcutaneous Solution Auto-injector Inject 2.5 mg into the skin once a week. 2 mL 1    Meloxicam 15 MG Oral Tab Take 1 tablet (15 mg total) by mouth daily. 30 tablet 3    mupirocin 2 % External Ointment       azelastine 0.1 % Nasal Solution 2 sprays by Nasal route 2 (two) times daily.      buPROPion  MG Oral Tablet 12 Hr Take 1 tablet (150 mg total) by mouth daily.      fluticasone propionate 50 MCG/ACT Nasal Suspension 2 sprays by Nasal route daily.      albuterol 108 (90 Base) MCG/ACT Inhalation Aero Soln As Directed.      cyclobenzaprine 5 MG Oral Tab TAKE ONE TABLET BY MOUTH EVERY NIGHT AT BEDTIME OR TWICE DAILY AS NEEDED FOR NECK PAIN / SPASMS      ABSORICA 40 MG Oral Cap Take 1.25 capsules (50 mg total) by mouth daily.      Potassium 99 MG Oral Tab Take by mouth.      Magnesium 100 MG Oral Cap Take by mouth.      Glucosamine-Chondroit-Collagen ([employer] GLUCO-CHONDROIT PLUS UC-II) 125-100-10 MG Oral Tab Take 125 mg by mouth 2  (two) times a day.      SUMAtriptan Succinate (IMITREX) 100 MG Oral Tab 1 po once.  Repeat once after 2 hours if needed.  Max 2 per 24 hours. 9 tablet 1    diazePAM 2 MG Oral Tab as needed.      amphetamine-dextroamphetamine 10 MG Oral Tab 5mg bid      BIOTIN OR Take by mouth.      Omega-3 Fatty Acids (OMEGA-3 FISH OIL OR) Take by mouth.      Vitamin D3 1000 units Oral Tab Take 1 tablet (1,000 Units total) by mouth daily.        Past Medical History:   Diagnosis Date    Anesthesia complication     HARD TO AWAKEN    Asthma     due bouts of flu    Bipolar I disorder, single manic episode (HCC) 6/29/2012    Neurocardiogenic syncope     had as teen, outgrew    Polycystic disease, ovaries     PONV (postoperative nausea and vomiting)     Post-cholecystectomy syndrome       Social History:  Social History     Socioeconomic History    Marital status:    Tobacco Use    Smoking status: Never    Smokeless tobacco: Never   Vaping Use    Vaping Use: Never used   Substance and Sexual Activity    Alcohol use: No     Alcohol/week: 0.0 standard drinks of alcohol    Drug use: No   Other Topics Concern    Caffeine Concern Yes     Comment: 12 ounces daily     Exercise Yes     Comment: active         REVIEW OF SYSTEMS:   GENERAL HEALTH: feels well otherwise    EXAM:   /64   Pulse 78   Temp 97 °F (36.1 °C)   Resp 16   Ht 5' 1\" (1.549 m)   Wt 176 lb 6.4 oz (80 kg)   LMP 12/21/2023 (Within Days)   BMI 33.33 kg/m²   GENERAL: well developed, well nourished,in no apparent distress  SKIN: no rashes,no suspicious lesions  NECK: supple,no adenopathy  LUNGS: clear to auscultation  CARDIO: RRR without murmur  EXTREMITIES: no cyanosis, clubbing or edema    ASSESSMENT AND PLAN:     Encounter Diagnoses   Name Primary?    PCOS (polycystic ovarian syndrome) Yes    Mild intermittent asthma without complication        Orders Placed This Encounter   Procedures    Hemoglobin A1C [E]    Basic Metabolic Panel (8) [E]       Meds & Refills  for this Visit:  Requested Prescriptions     Signed Prescriptions Disp Refills    metFORMIN  MG Oral Tablet 24 Hr 90 tablet 1     Sig: Take 1 tablet (500 mg total) by mouth daily.       Imaging & Consults:  None    The patient indicates understanding of these issues and agrees to the plan.  The patient is asked to return in 6 months for complete physical.

## 2024-02-07 ENCOUNTER — PATIENT MESSAGE (OUTPATIENT)
Dept: INTERNAL MEDICINE CLINIC | Facility: CLINIC | Age: 39
End: 2024-02-07

## 2024-02-08 NOTE — TELEPHONE ENCOUNTER
From: Sosa Hayes  To: Blanca Sabillon  Sent: 2/7/2024 7:21 PM CST  Subject: Zepbound dosage    Hi Blanca,    I'm long term through my second week of Zepbound.    Things are going really well, no side effects.    I'm scheduled for a follow-up in early April.    I *did* notice I'm getting very hungry by the 4th day. Is this normal? I'm at 2.5 mg. Do I need a higher dose?    Thank you,    Sosa

## 2024-02-16 NOTE — TELEPHONE ENCOUNTER
Requesting   Zepbound incease    LOV: 11/20/23  RTC: 3 months  Filled: 12/24/23 #2 with 1 refills    Future Appointments   Date Time Provider Department Center   3/18/2024  2:00 PM Blanca Sabillon APRN [facility] [id number]   7/18/2024  7:30 AM Dressler, Stephanie, PA-C [facility] [id number]     Ready to go up, weight between 170-172

## 2024-02-17 RX ORDER — TIRZEPATIDE 5 MG/.5ML
5 INJECTION, SOLUTION SUBCUTANEOUS WEEKLY
Qty: 2 ML | Refills: 0 | Status: SHIPPED | OUTPATIENT
Start: 2024-02-17

## 2024-02-17 NOTE — TELEPHONE ENCOUNTER
I have approved patient dose increase. Please note I will no longer be titrating doses outside of an office visit. Please do not offer a dose increase to any of my patients. If they ask you can provide them with the scripting above. There are a few exceptions however, if I have indicated on the patient's LOV AVS/Education to contact me for a titration, then I will likely approve, otherwise as a general plan of care- this will only be done during a visit. Please always check LOV/AVS if a patient asks. Thank you.

## 2024-03-18 ENCOUNTER — OFFICE VISIT (OUTPATIENT)
Dept: INTERNAL MEDICINE CLINIC | Facility: CLINIC | Age: 39
End: 2024-03-18
Payer: COMMERCIAL

## 2024-03-18 VITALS
WEIGHT: 169 LBS | SYSTOLIC BLOOD PRESSURE: 110 MMHG | RESPIRATION RATE: 16 BRPM | HEART RATE: 76 BPM | DIASTOLIC BLOOD PRESSURE: 70 MMHG | BODY MASS INDEX: 31.91 KG/M2 | HEIGHT: 61 IN

## 2024-03-18 DIAGNOSIS — E88.819 INSULIN RESISTANCE: ICD-10-CM

## 2024-03-18 DIAGNOSIS — Z51.81 ENCOUNTER FOR THERAPEUTIC DRUG MONITORING: Primary | ICD-10-CM

## 2024-03-18 DIAGNOSIS — E66.01 CLASS 2 SEVERE OBESITY WITH SERIOUS COMORBIDITY AND BODY MASS INDEX (BMI) OF 37.0 TO 37.9 IN ADULT, UNSPECIFIED OBESITY TYPE (HCC): ICD-10-CM

## 2024-03-18 PROCEDURE — 3074F SYST BP LT 130 MM HG: CPT | Performed by: NURSE PRACTITIONER

## 2024-03-18 PROCEDURE — 3078F DIAST BP <80 MM HG: CPT | Performed by: NURSE PRACTITIONER

## 2024-03-18 PROCEDURE — 3008F BODY MASS INDEX DOCD: CPT | Performed by: NURSE PRACTITIONER

## 2024-03-18 PROCEDURE — 99213 OFFICE O/P EST LOW 20 MIN: CPT | Performed by: NURSE PRACTITIONER

## 2024-03-18 RX ORDER — TIRZEPATIDE 5 MG/.5ML
5 INJECTION, SOLUTION SUBCUTANEOUS WEEKLY
Qty: 2 ML | Refills: 0 | Status: SHIPPED | OUTPATIENT
Start: 2024-03-18

## 2024-03-18 NOTE — PROGRESS NOTES
Sosa Hayes is a 38 year old female presents today for follow-up on medical weight loss program for the treatment of overweight, obesity, or morbid obesity with associated IR.    S:  Current weight   Wt Readings from Last 6 Encounters:   03/18/24 169 lb (76.7 kg)   01/18/24 176 lb 6.4 oz (80 kg)   01/10/24 178 lb (80.7 kg)   11/20/23 177 lb (80.3 kg)   08/21/23 186 lb (84.4 kg)   08/19/23 185 lb (83.9 kg)    AND BMI Body mass index is 31.93 kg/m²..    Patient has lost 8# since LOV 4 month ago.  She has been compliant with therapy and tolerating switch from Saxenda to Zepbound. Would like to stay at current dose of 5 mg weekly for now. On metformin ER by PCP and had recent labs in 1/2024- reviewed.    Testing/consult completed since LOV: Dietician: Estimated caloric needs for weight loss: 1675 cals/d for one pounds/week weight loss.    [facility] Medical Weight Loss Follow Up      Question 3/13/2024  1:42 AM CDT - Filed by Patient   Please describe a success moment: Rowing 75 lbs   Please describe a challenging moment/needs for improvement: Consistency with gym - haven't gone as much this month as January and February   Please complete this 24 hour food journal, listing everything you had to eat in the past day. Include the average time of day you ate these meals at    List foods, qty and prep for breakfast: 8AM [employer]ins cereal with 1/2 c. unsweetened almond milk, 12 oz decaf coffee with nondairy creamer   List foods, qty and prep for lunch. 12:30 PM Bell pepper, small cucumber, 1 T ranch, 32 g protein shake   List foods, qty and prep for dinner. 6:00 PM 1 c. steamed peas, 6 oz flank steak or grilled chicken breast   List foods, qty and prep for snacks.    List the types and qty of fluids consumed Water - 45-60 oz, sometimes 8 oz of soda or juice as a sweet treat   On average, how many meals did you eat out per week? 0   Exercise    How many days per week are you active or exercise 6   On average, how many days  were anaerobic (strength/resistance) exercises performed? 2   On average, how many days were aerobic (cardio) exercises performed? 3   Perceived level of exertion on a scale of 1-5, with 5 being very intense: 4   Stress    Average stress level on a scale of 1-10, with 10 being extremely stressed: 4   If greater than 5/1O how would you grade your coping mechanisms? moderate   Sleep hours and integrity    How many hours of uninterrupted sleep do you get a night: 5   Do you feel rested in the morning: No   If no, what may have been disrupting your sleep? Keep waking up around 4:00 am after 4-6 hours of sleep   Please list any goal(s) for your next visit      Social hx and PMH reviewed. Homemaker.  with 3 children. Spouse working on weight loss as well.    REVIEW OF SYSTEMS:  GENERAL: feels well otherwise  LUNGS: denies shortness of breath with exertion  CARDIOVASCULAR: denies chest pain on exertion, denies palpitations or pedal edema  GI: denies abdominal pain.  No N/V/D/C  MUSCULOSKELETAL: no acute joint or muscle pain, chronic pain  NEURO: denies headaches  PSYCH: denies change in behavior or mood, denies feeling sad or depressed. Answers submitted by the patient for this visit:  Medical Weight Loss Follow Up (Submitted on 11/19/2023)  If greater than 5/1O how would you grade your coping mechanisms?: moderate      EXAM:  /70   Pulse 76   Resp 16   Ht 5' 1\" (1.549 m)   Wt 169 lb (76.7 kg)   LMP 03/10/2024 (Within Days)   Breastfeeding No   BMI 31.93 kg/m²   GENERAL: well developed, well nourished, in no apparent distress, obese  EYES: conjunctiva pink, sclera non icteric, PERRLA  LUNGS: CTA in all fields, breathing non labored  CARDIO: RRR without murmur, normal S1 and S2 without clicks or gallops, no pedal edema.  GI: +BS, soft, non tender  NEURO/MS: motor and sensory grossly intact  PSYCH: pleasant, cooperative, normal mood and affect    ASSESSMENT AND PLAN:  Reviewed Initial Weight Data and Goal  Weight Loss:       Encounter Diagnoses   Name Primary?    Encounter for therapeutic drug monitoring Yes    Class 2 severe obesity with serious comorbidity and body mass index (BMI) of 37.0 to 37.9 in adult, unspecified obesity type (HCC)     Insulin resistance          No orders of the defined types were placed in this encounter.      Meds & Refills for this Visit:  Requested Prescriptions     Signed Prescriptions Disp Refills    Tirzepatide-Weight Management (ZEPBOUND) 5 MG/0.5ML Subcutaneous Solution Auto-injector 2 mL 0     Sig: Inject 5 mg into the skin once a week.       Imaging & Consults:  None      Plan:  Patient has lost 8# since LOV 4 month ago on Zepbound 5 mg weekly, Metformin  mg daily (PCP) with a total weight loss of 29# since initial consult on 6/15/23 with initial weight of 198#. Weight loss goal:  reach 175-185# in 6 months and ultimately down to 170#.  CPM.  on sleep tips. See patient instructions below for additional plans and patient counseling.      Patient Instructions   Continue making lifestyle changes that focus on good nutrition, regular exercise and stress management.    Medication Plan: Continue current medication regimen and send MyChart status after 3rd dose on Zepbound 5 mg weekly to determine next dose.    Next steps to work on before next office visit include: Plan to recheck body composition after 20# weight loss. Great work with consistency of fitness. Recommended sleep tips below.    Sleeping off the Pounds  by Domenica Mcdonnell MD  Summer 2009, [employer] www.obesityaction.org  While many Americans of all ages are striving to practice key factors to maintaining a healthy lifestyle such as eating balanced meals and maintaining regular physical activity, we may be forgetting one of the most important, most natural methods of weight-loss and maintenance - getting a good night’s sleep.  Over many years, several large-scale studies have noted the association  between lack of sleep and overweight/obesity.    How Does Sleep Affect Weight?  A study published by [facility] professors demonstrated that subjects sleeping five hours per night were about 73 percent more likely to become affected by obesity than those sleeping seven to nine hours per night. While this is astounding, even increasing to six hours per night still makes an individual 27 percent more likely to become affected by obesity than a neighbor who sleeps seven to nine hours. So calm that barking dog!  Interestingly, this study discovered that subjects with severe obesity (body mass index above 60) tend to have reduced periods of sleep. This may be due to sleep apnea - characterized by loud snoring and episodes of cessation of breathing during sleep that make one more likely to fall asleep during the day.  Furthermore, a study in the [place] confirmed a similar relationship between sleep and obesity in both children and adults. So, we should be sure to instill good sleeping habits as a priority in the youth of [place].  Recommendations for Sleep  The current recommendations for sleep are:  Infants (3-11 months old) : 14 to 15 hours  Toddlers: 12 to 14 hours  Pre-school Child: 11 to 13 hours  School-aged Children: 10 to 11 hours  Adolescents: 9 hours  Adults: 7 to 9 hours    Hormones and Your Weight  While these studies cannot be used to imply a cause and effect relationship, the correlation cannot be ignored. Scientists have devised a theory to explain why lack of sleep is associated with higher weight.  Two hormones, ghrelin and leptin, play important roles in controlling hunger and they are activated based on how much a person sleeps. Simply put, leptin decreases the sensation of hunger and ghrelin stimulates hunger.  While we sleep, our bodies use the down time to process the amount of fat stores present. If we have an adequate amount of sleep time, excess fat stores can be recognized and  thus, the hormone leptin is activated to tell us to eat less the following day. However, when a person is sleep deprived, the levels of leptin decrease and ghrelin, a hunger hormone, is activated and we are stimulated to eat more the next day. This phenomenon may sound familiar if you noticed a craving for high-calorie, starchy foods the day after working or studying late. Several years of sleep deprivation can really cause you to pack on the pounds.  Your mother was right! Hitting the sack is not only useful for staying awake the next day but also giving your body the rest it needs to maintain a hormone balance that will help you eat less and have more energy to sustain exercise.  If you have trouble sleeping or maintaining sleep after adopting the habits of sleep hygiene, be sure to discuss your symptoms with your medical provider as proper diagnosis and treatment of sleep disorders is essential to maintaining a healthy weight as well as general well-being.  Common Sleep Disorders  Insomnia - difficulty falling asleep or maintaining sleep.  Sleep apnea - loud snoring and interruptions in breathing when sleeping.  Shift work sleep disorder - inability to fall asleep or excessive sleepiness in a person who works evening shifts or alternating shifts.    Helpful Tips to Catch Some Zzz’s  For those of you who toss and turn all night, meeting these goals can be challenging. So, just for you, a medical specialty called sleep medicine was derived. Sleep specialists have developed a set of proven rules called “sleep hygiene” that will increase your likelihood of getting your “zzz’s.”  Avoid napping during the day.  Maintain a regular bedtime routine and try to stick with the same sleeping and waking times seven days a week.  Avoid caffeine, nicotine, alcohol and stimulant medications close to bedtime.  Reserve vigorous exercise for morning or late afternoon. Opt for more relaxing exercises at night.  Avoid large meals at  bedtime. Keep evening snacks in the 100-200 calorie range.  Elderly people and night-shift workers should be sure to get good light exposure during “daytime” from indoor lighting, while closing blinds, etc. 1-2 hours before bedtime to help set the Circadian rhythm.  Use the bed only for sleeping and sex. Try not to read, eat or watch television in bed.  Keep your sleep environment comfortable, pleasant and climate-controlled.    …Happy Dreams!          Medication use and SEs reviewed with patient.    Return in about 3 months (around 6/18/2024) for weight management via clinic or VV.    Patient verbalizes understanding.          Answers submitted by the patient for this visit:  Medical Weight Loss Follow Up (Submitted on 3/13/2024)  If greater than 5/1O how would you grade your coping mechanisms?: moderate

## 2024-03-18 NOTE — PATIENT INSTRUCTIONS
Continue making lifestyle changes that focus on good nutrition, regular exercise and stress management.    Medication Plan: Continue current medication regimen and send MyChart status after 3rd dose on Zepbound 5 mg weekly to determine next dose.    Next steps to work on before next office visit include: Plan to recheck body composition after 20# weight loss. Great work with consistency of fitness. Recommended sleep tips below.    Sleeping off the Pounds  by Domenica Mcdonnell MD  Summer 2009, [employer] www.obesityaction.org  While many Americans of all ages are striving to practice key factors to maintaining a healthy lifestyle such as eating balanced meals and maintaining regular physical activity, we may be forgetting one of the most important, most natural methods of weight-loss and maintenance - getting a good night’s sleep.  Over many years, several large-scale studies have noted the association between lack of sleep and overweight/obesity.    How Does Sleep Affect Weight?  A study published by [facility] professors demonstrated that subjects sleeping five hours per night were about 73 percent more likely to become affected by obesity than those sleeping seven to nine hours per night. While this is astounding, even increasing to six hours per night still makes an individual 27 percent more likely to become affected by obesity than a neighbor who sleeps seven to nine hours. So calm that barking dog!  Interestingly, this study discovered that subjects with severe obesity (body mass index above 60) tend to have reduced periods of sleep. This may be due to sleep apnea - characterized by loud snoring and episodes of cessation of breathing during sleep that make one more likely to fall asleep during the day.  Furthermore, a study in the [place] confirmed a similar relationship between sleep and obesity in both children and adults. So, we should be sure to instill good sleeping habits as  a priority in the youth of [place].  Recommendations for Sleep  The current recommendations for sleep are:  Infants (3-11 months old) : 14 to 15 hours  Toddlers: 12 to 14 hours  Pre-school Child: 11 to 13 hours  School-aged Children: 10 to 11 hours  Adolescents: 9 hours  Adults: 7 to 9 hours    Hormones and Your Weight  While these studies cannot be used to imply a cause and effect relationship, the correlation cannot be ignored. Scientists have devised a theory to explain why lack of sleep is associated with higher weight.  Two hormones, ghrelin and leptin, play important roles in controlling hunger and they are activated based on how much a person sleeps. Simply put, leptin decreases the sensation of hunger and ghrelin stimulates hunger.  While we sleep, our bodies use the down time to process the amount of fat stores present. If we have an adequate amount of sleep time, excess fat stores can be recognized and thus, the hormone leptin is activated to tell us to eat less the following day. However, when a person is sleep deprived, the levels of leptin decrease and ghrelin, a hunger hormone, is activated and we are stimulated to eat more the next day. This phenomenon may sound familiar if you noticed a craving for high-calorie, starchy foods the day after working or studying late. Several years of sleep deprivation can really cause you to pack on the pounds.  Your mother was right! Hitting the sack is not only useful for staying awake the next day but also giving your body the rest it needs to maintain a hormone balance that will help you eat less and have more energy to sustain exercise.  If you have trouble sleeping or maintaining sleep after adopting the habits of sleep hygiene, be sure to discuss your symptoms with your medical provider as proper diagnosis and treatment of sleep disorders is essential to maintaining a healthy weight as well as general well-being.  Common Sleep Disorders  Insomnia - difficulty  falling asleep or maintaining sleep.  Sleep apnea - loud snoring and interruptions in breathing when sleeping.  Shift work sleep disorder - inability to fall asleep or excessive sleepiness in a person who works evening shifts or alternating shifts.    Helpful Tips to Catch Some Zzz’s  For those of you who toss and turn all night, meeting these goals can be challenging. So, just for you, a medical specialty called sleep medicine was derived. Sleep specialists have developed a set of proven rules called “sleep hygiene” that will increase your likelihood of getting your “zzz’s.”  Avoid napping during the day.  Maintain a regular bedtime routine and try to stick with the same sleeping and waking times seven days a week.  Avoid caffeine, nicotine, alcohol and stimulant medications close to bedtime.  Reserve vigorous exercise for morning or late afternoon. Opt for more relaxing exercises at night.  Avoid large meals at bedtime. Keep evening snacks in the 100-200 calorie range.  Elderly people and night-shift workers should be sure to get good light exposure during “daytime” from indoor lighting, while closing blinds, etc. 1-2 hours before bedtime to help set the Circadian rhythm.  Use the bed only for sleeping and sex. Try not to read, eat or watch television in bed.  Keep your sleep environment comfortable, pleasant and climate-controlled.    …Happy Dreams!

## 2024-04-09 ENCOUNTER — PATIENT MESSAGE (OUTPATIENT)
Dept: INTERNAL MEDICINE CLINIC | Facility: CLINIC | Age: 39
End: 2024-04-09

## 2024-04-09 DIAGNOSIS — Z51.81 ENCOUNTER FOR THERAPEUTIC DRUG MONITORING: Primary | ICD-10-CM

## 2024-04-09 DIAGNOSIS — E88.819 INSULIN RESISTANCE: ICD-10-CM

## 2024-04-09 DIAGNOSIS — E66.01 CLASS 2 SEVERE OBESITY WITH SERIOUS COMORBIDITY AND BODY MASS INDEX (BMI) OF 37.0 TO 37.9 IN ADULT, UNSPECIFIED OBESITY TYPE (HCC): ICD-10-CM

## 2024-04-09 NOTE — TELEPHONE ENCOUNTER
From: Sosa Hayes  To: Blanca Sabillon  Sent: 4/9/2024 11:36 AM CDT  Subject: Zepbound 7.5 mg    Campbell Rios,     Hope you are well.    I am on my third shot of 5 mg of Zepbound, and I'm continuing to tolerate it well.    I would like to increase to the 7.5 mg dose.    Thank you,   Sosa

## 2024-04-09 NOTE — TELEPHONE ENCOUNTER
Please advise. Thank you.  Patient would like to increase dose of zepbound from 5 mg to 7.5 mg.  Reports tolerating well.    Last office visit: 3/18/24  Next office visit: 7/1/24

## 2024-04-14 RX ORDER — TIRZEPATIDE 10 MG/.5ML
10 INJECTION, SOLUTION SUBCUTANEOUS WEEKLY
Qty: 2 ML | Refills: 1 | Status: SHIPPED | OUTPATIENT
Start: 2024-04-14

## 2024-04-14 RX ORDER — TIRZEPATIDE 7.5 MG/.5ML
7.5 INJECTION, SOLUTION SUBCUTANEOUS WEEKLY
Qty: 2 ML | Refills: 0 | Status: SHIPPED | OUTPATIENT
Start: 2024-04-14

## 2024-04-23 ENCOUNTER — TELEPHONE (OUTPATIENT)
Dept: INTERNAL MEDICINE CLINIC | Facility: CLINIC | Age: 39
End: 2024-04-23

## 2024-04-23 DIAGNOSIS — E66.01 CLASS 2 SEVERE OBESITY WITH SERIOUS COMORBIDITY AND BODY MASS INDEX (BMI) OF 37.0 TO 37.9 IN ADULT, UNSPECIFIED OBESITY TYPE (HCC): Primary | ICD-10-CM

## 2024-04-23 NOTE — TELEPHONE ENCOUNTER
Patient left a message she is having trouble locating zepbound 7.5 mg.  She has a box of 2.5 mg at home she never used and she wonders if you would write for 5 mg weekly and she could take both to equal 7.5 mg and then she would need 10 mg after that.

## 2024-04-24 RX ORDER — TIRZEPATIDE 5 MG/.5ML
5 INJECTION, SOLUTION SUBCUTANEOUS WEEKLY
Qty: 2 ML | Refills: 0 | Status: SHIPPED | OUTPATIENT
Start: 2024-04-24

## 2024-04-30 ENCOUNTER — PATIENT MESSAGE (OUTPATIENT)
Dept: INTERNAL MEDICINE CLINIC | Facility: CLINIC | Age: 39
End: 2024-04-30

## 2024-04-30 NOTE — TELEPHONE ENCOUNTER
From: Sosa Hayes  To: Abraham BERNARD  Sent: 4/30/2024 10:17 AM CDT  Subject: Protein Goals    Hi Abraham,     Hope you are doing well.     I was wondering if you could help me figure out what my daily protein goals should be for weight loss?    I'm 5'2\" and between 165-170 lbs.    Thanks!   Sosa

## 2024-04-30 NOTE — TELEPHONE ENCOUNTER
Campbell Swan,    I hope you're well.  A protein goal for you would be to consume between 80-90  grams protein daily.    In good health,  Abraham

## 2024-05-17 ENCOUNTER — TELEPHONE (OUTPATIENT)
Dept: FAMILY MEDICINE CLINIC | Facility: CLINIC | Age: 39
End: 2024-05-17

## 2024-05-17 NOTE — TELEPHONE ENCOUNTER
Patient has upcoming physical on 7/22/24 and would like to know if she should come to the appointment fasting for blood work.

## 2024-05-29 DIAGNOSIS — E88.819 INSULIN RESISTANCE: ICD-10-CM

## 2024-05-29 DIAGNOSIS — Z51.81 ENCOUNTER FOR THERAPEUTIC DRUG MONITORING: ICD-10-CM

## 2024-05-29 DIAGNOSIS — E66.01 CLASS 2 SEVERE OBESITY WITH SERIOUS COMORBIDITY AND BODY MASS INDEX (BMI) OF 37.0 TO 37.9 IN ADULT, UNSPECIFIED OBESITY TYPE (HCC): ICD-10-CM

## 2024-05-29 NOTE — TELEPHONE ENCOUNTER
Requesting   Requested Prescriptions     Pending Prescriptions Disp Refills    ZEPBOUND 7.5 MG/0.5ML Subcutaneous Solution Auto-injector [Pharmacy Med Name: ZEPBOUND 7.5MG/0.5ML INJ (4PF PENS)] 2 mL 0     Sig: ADMINISTER 7.5 MG UNDER THE SKIN 1 TIME A WEEK     LOV: 3/18/24  RTC: 3 months  Filled: 4/14/24 #2 with 0 refills    Future Appointments   Date Time Provider Department Center   7/1/2024  2:00 PM Blanca Sabillon APRN [facility] [id number]   7/22/2024  8:30 AM Dressler, Stephanie, PA-C [facility] [id number]

## 2024-06-03 RX ORDER — TIRZEPATIDE 7.5 MG/.5ML
7.5 INJECTION, SOLUTION SUBCUTANEOUS WEEKLY
Qty: 2 ML | Refills: 0 | OUTPATIENT
Start: 2024-06-03

## 2024-07-01 ENCOUNTER — TELEMEDICINE (OUTPATIENT)
Dept: INTERNAL MEDICINE CLINIC | Facility: CLINIC | Age: 39
End: 2024-07-01
Payer: COMMERCIAL

## 2024-07-01 DIAGNOSIS — E66.01 CLASS 2 SEVERE OBESITY WITH SERIOUS COMORBIDITY AND BODY MASS INDEX (BMI) OF 37.0 TO 37.9 IN ADULT, UNSPECIFIED OBESITY TYPE (HCC): ICD-10-CM

## 2024-07-01 DIAGNOSIS — Z51.81 ENCOUNTER FOR THERAPEUTIC DRUG MONITORING: Primary | ICD-10-CM

## 2024-07-01 DIAGNOSIS — E88.819 INSULIN RESISTANCE: ICD-10-CM

## 2024-07-01 DIAGNOSIS — E28.2 PCOS (POLYCYSTIC OVARIAN SYNDROME): ICD-10-CM

## 2024-07-01 NOTE — PROGRESS NOTES
[facility] follow up via video visit:    Subjective    This visit is conducted using Telemedicine with live, interactive video and audio.    Chief Complaint:  Routine follow up visit for lifestyle and medical management for overweight, obesity, or morbid obesity. LOV in clinic weight: 169#.    PMH reviewed. Bariatric surgery planned or hx of surgery in the past: 2/10.    HPI:   Sosa Hayes is a 39 year old female who is being followed up today for lifestyle and medical management as deemed appropriate for overweight, obesity or morbid obesity. Patient reports weight loss monitoring at home via scale with reported weight maintenance since LOV 3.5 months ago in clinic. Patient has been consistent on Zepbound 5 mg weekly and just received the 7.5 and 10 mg weekly doses.    Questions/Concerns/Comments since LOV: She has been happy to maintain her weight loss during this time of supply shortages on Zepbound.    Lifestyle/Social Hx Reviewed:    [facility] Medical Weight Loss Follow Up    Question 7/1/2024  8:39 AM CDT - Filed by Patient   Please describe a success moment: Maintaining weight loss   Please describe a challenging moment/needs for improvement: Finding medication: haven't been able to get to the dose i wanted. I'm still on 5 mg as a result.   Please complete this 24 hour food journal, listing everything you had to eat in the past day. Include the average time of day you ate these meals at    List foods, qty and prep for breakfast: Bowl of cereal and coffee   List foods, qty and prep for lunch. Two eggs, bell pepper and cucumbers   List foods, qty and prep for dinner. Flank steak, bell pepper, edamame, and whole wheat bread   List foods, qty and prep for snacks. Owyn protein shakes   List the types and qty of fluids consumed 40-60 oz of water   On average, how many meals did you eat out per week? 1   Exercise    How many days per week are you active or exercise 4   On average, how many  days were anaerobic (strength/resistance) exercises performed? 2   On average, how many days were aerobic (cardio) exercises performed? 2   Perceived level of exertion on a scale of 1-5, with 5 being very intense: 3   Stress    Average stress level on a scale of 1-10, with 10 being extremely stressed: 5   If greater than 5/1O how would you grade your coping mechanisms? moderate   Sleep hours and integrity    How many hours of uninterrupted sleep do you get a night: 10   Do you feel rested in the morning: No   If no, what may have been disrupting your sleep? ADHD   Please list any goal(s) for your next visit Continue Zepbound     ROS  General: feeling well, denies fatigue  CV: denies cp, palpitations  Resp: denies sob  GI: denies abdominal pain. Denies N/V/D/C.  Neuro: denies paresthesia or cognitive changes  Psych: denies any mood changes    Physical Exam:  >   BP Readings from Last 3 Encounters:   03/18/24 110/70   01/18/24 110/64   01/10/24 100/80       Home weight: see above  Home BP: not available    General: patient speaking in full sentences, no increased work of breathing. alert, appears stated age, cooperative, no distress, and mildly obese  HENT: normocephalic  Resp: Breathing is non labored  Psych: patient appears cheerful, smiling, making good eye contact    Diagnoses and all orders for this visit:    Encounter for therapeutic drug monitoring    Class 2 severe obesity with serious comorbidity and body mass index (BMI) of 37.0 to 37.9 in adult, unspecified obesity type (HCC)    Insulin resistance    PCOS (polycystic ovarian syndrome)        Patient Instructions   Continue making lifestyle changes that focus on good nutrition, regular exercise and stress management.    Medication Plan: Continue current medication regimen with increase to Zepbound 7.5 mg weekly x4 weeks and then increase to 10 mg weekly thereafter. Send MyChart status after 3rd dose on Zepbound 10 mg weekly to determine next dose.    Next  steps to work on before next office visit include: Keep up the self discipline and balanced lifestyle for success! Great work in maintaining over the summer and with the Zepbound supply shortage. Some tips listed below with summer travel.      Motivation gets you going, but DISCIPLINE keeps you GROWING!     Discipline is the bridge between goals and accomplishments      Staying Healthy While Traveling    by Vanessa Messina RD    OAC at www.obesityaction.org Summer 2023 Resource    It’s time to go on a vacation! Summertime is a popular season for travel, and vacations are a wonderful way to unwind, discover new places, and spend time with family and friends. Taking a break from your daily routine is really important. When you return, you’ll feel refreshed and ready for the next things you need to do! As you pack your bags and plan your trip, remember to stay focused on your health and wellness goals. With a bit of planning, it’s not hard to stay active, eat well, and have a great time during your vacation.    Do Your Research and Plan Ahead  Keeping yourself on track during a vacation might feel like an impossible challenge. However, with the right preparation, it’s entirely achievable. Before you leave, take some time to plan your vacation. Look for ways to include health and nutrition in your itinerary, such as trying new activities and foods. You’ll be pleasantly surprised to find that many places have great options for staying healthy.    Check your Nutrition  Instead of constantly eating sweets, fried foods and junk, seek out healthier alternatives. Choosing nutritious foods while traveling has many advantages. First, eating healthy will give you more energy, which is important for having a fun vacation. Second, sticking to your plan will give you a sense of accomplishment and satisfaction. Here are some tips to help you choose wisely and prioritize your well-being.    Consider using grocery delivery services. You  can shop online from home and have meals and snacks delivered to your hotel. Just make sure your room has a refrigerator. This way, you won’t have to search for a store and can avoid the temptation of unhealthy vacation treats. You can stock up on fresh produce, cheese sticks, popcorn and protein bars to keep in your hotel room.    Make eating out fun by finding new restaurants to try at your destination. Take a moment to look up their menus online and come up with a plan. You can plan to try fresh fish at a local beach restaurant, visit a [place] and choose a lean cut of meat with a side salad, or enjoy chicken fajitas at a Mexican restaurant.  Remember to give yourself a break. It’s okay to indulge in a special meal or snack during your vacation. One treat doesn’t mean the whole day is ruined. Just get back on track with your healthy eating at the next meal.    Finding Fitness  Discovering fun fitness activities can help you build a habit that you’ll want to continue. When you’re on vacation, make it a point to set aside time to move your body. This will not only increase your energy levels but also make you feel positive and satisfied about being active. Be creative and think outside the box to come up with exciting ideas to stay active during your vacation!    Try to find ways to move throughout the day. Look for trails in a local park, a gym at your hotel, or join an exercise class for the day. Keep things interesting by varying your activities and trying something new.  Involve the whole family in staying active. Rent bikes for an afternoon, plan a walk after dinner, or try something different like surfing! You can also let your kids choose an activity. You might be surprised to hear what ideas they have!    Mind Your Mental Health  Vacations are meant to be relaxing and a chance to recharge. Make sure you have a vacation that helps you rest and rejuvenate! Sometimes it’s easy to plan too many activities  and take on too many commitments. Take a moment to find balance between fun and fitness.    Pack items you enjoy. Bring your favorite books, puzzles, music, or whatever you love to ensure you have time to do just that. Sneaking away for a few minutes can give you time to spend on your favorite things.  Remember to rest. Vacations can be full of exciting things, but it’s also important to take it easy. Take an afternoon nap or find some quiet time alone to recharge.    Include activities that you enjoy. Vacations offer a lot of things to do, so make sure you have activities that you personally enjoy along with the rest of the family.    How to: Make it Through a Long Day of Travel  Whether you’re taking a road trip for a few hours or a plane across the [place] travel days require some planning. Here are some tips to make your trip easier.    Pack a snack bag for long road trips or airport days. When in the car, consider bringing a cooler with water to avoid sugary snacks at the gas stations. Pack fresh fruits, vegetables, water, cheese and meats. Include snacks like popcorn, pretzels or whole-grain crackers. Airline travel can be more challenging, but you can pack snacks such as protein bars, trail mix or popcorn in your carry-on. Bring a refillable water bottle to stay hydrated throughout the day!    Find ways to walk, even on your busiest travel day. If you’re driving, take quick 10-15-minute walks every couple of hours. It will not only give you some cardio exercise but also reduce stress and improve your mood during long car rides. If you’re stuck in an airport, walk up and down the terminal while you wait.  Bring items like books, adult coloring books or knitting supplies to keep busy and reduce screen time. This can be a great opportunity to explore a new hobby and let your creative juices flow.  How to: Make Healthy Food Choices When Eating Out  Making healthy food choices while eating out on vacation can be  challenging.    Instead of donuts and pastries for breakfast, go for high-protein items like eggs, turkey sausage, turkey daniels, oats and whole-grain toast. Ask for fresh fruit or low-fat yogurt on the side to make breakfast even more nutritious.  Restaurant portions can be large. If someone in your group is willing to split a meal, that can be an easy solution. Since you might not have enough space to take leftovers with you, you may have to leave some behind.  When looking at the lunch or dinner menu, choose grilled, baked or boiled options. This can save you a lot of calories and fat. Be mindful of side dishes, as they can add up quickly. Consider swapping a high-calorie side item for a side salad, fruit or vegetables.    Desserts can be tempting, but plan ahead if you want to order one. Choose a lower-calorie meal to balance it out. You can also share the dessert with others at your table to enjoy a smaller portion.  How to: Deal With an unexpected change of plans  We’ve all been there. Your perfectly planned vacation goes sideways. How do you make the most of these days? It can be easy to throw in the towel, but always be ready with a backup plan.    The weather can change unexpectedly. A rainy day might ruin your beach or mountain hike plans. Take on the challenge and find new ways to have fun. Look for an indoor pool or an activity center to keep working towards your goals!    Your carefully chosen restaurant may not have healthy options, or your grocery store delivery might be late. Don’t give up; just adapt and make a new decision. Make the best choice you can and move on to the next meal. It’s okay if it’s not perfect!    Sometimes your perfectly planned day doesn’t go as expected. Kids may start fighting during a trip to the park, or someone might get sick on vacation. Focus on the positive moments and enjoy the time you have.  Wherever you go on your summer vacation, make sure to enjoy the break,  relaxation and adventure. Taking time away from your usual routine can be amazing, and keeping up with your health and fitness goals can make your vacation even better.      Return in about 3 months (around 10/1/2024) for weight management via clinic.    DOCUMENTATION OF TIME SPENT: Code selection for this visit was based on time spent : 15 minutes on date of service in preparing to see the patient, obtaining and/or reviewing separately obtained history, performing a medically appropriate examination, counseling and educating the patient/family/caregiver, ordering medications or testing, referring and communicating with other healthcare providers, documenting clinical information in the electronic medical record, independently interpreting results and communicating results to the patient/family/caregiver and care coordination with the patient's other providers.

## 2024-08-29 DIAGNOSIS — Z51.81 ENCOUNTER FOR THERAPEUTIC DRUG MONITORING: ICD-10-CM

## 2024-08-29 DIAGNOSIS — E66.01 CLASS 2 SEVERE OBESITY WITH SERIOUS COMORBIDITY AND BODY MASS INDEX (BMI) OF 37.0 TO 37.9 IN ADULT, UNSPECIFIED OBESITY TYPE (HCC): ICD-10-CM

## 2024-08-29 DIAGNOSIS — E88.819 INSULIN RESISTANCE: ICD-10-CM

## 2024-08-30 ENCOUNTER — PATIENT MESSAGE (OUTPATIENT)
Dept: INTERNAL MEDICINE CLINIC | Facility: CLINIC | Age: 39
End: 2024-08-30

## 2024-08-30 DIAGNOSIS — E66.01 CLASS 2 SEVERE OBESITY WITH SERIOUS COMORBIDITY AND BODY MASS INDEX (BMI) OF 37.0 TO 37.9 IN ADULT, UNSPECIFIED OBESITY TYPE (HCC): ICD-10-CM

## 2024-08-30 DIAGNOSIS — Z51.81 ENCOUNTER FOR THERAPEUTIC DRUG MONITORING: Primary | ICD-10-CM

## 2024-08-30 DIAGNOSIS — E28.2 PCOS (POLYCYSTIC OVARIAN SYNDROME): ICD-10-CM

## 2024-08-30 DIAGNOSIS — E88.819 INSULIN RESISTANCE: ICD-10-CM

## 2024-09-03 RX ORDER — TIRZEPATIDE 12.5 MG/.5ML
12.5 INJECTION, SOLUTION SUBCUTANEOUS WEEKLY
Qty: 2 ML | Refills: 0 | Status: SHIPPED | OUTPATIENT
Start: 2024-09-03

## 2024-09-03 RX ORDER — TIRZEPATIDE 15 MG/.5ML
15 INJECTION, SOLUTION SUBCUTANEOUS WEEKLY
Qty: 2 ML | Refills: 1 | Status: SHIPPED | OUTPATIENT
Start: 2024-09-03

## 2024-09-03 NOTE — TELEPHONE ENCOUNTER
Last visit 7/1/24.    Future Appointments   Date Time Provider Department Center   10/7/2024  3:20 PM Blanca Sabillon APRN [id number]

## 2024-09-04 RX ORDER — TIRZEPATIDE 10 MG/.5ML
INJECTION, SOLUTION SUBCUTANEOUS
Qty: 2 ML | Refills: 1 | OUTPATIENT
Start: 2024-09-04

## 2024-09-09 ENCOUNTER — HOSPITAL ENCOUNTER (OUTPATIENT)
Age: 39
Discharge: HOME | End: 2024-09-09
Payer: COMMERCIAL

## 2024-09-09 ENCOUNTER — APPOINTMENT (OUTPATIENT)
Dept: CT IMAGING | Age: 39
End: 2024-09-09
Attending: NURSE PRACTITIONER
Payer: COMMERCIAL

## 2024-09-09 ENCOUNTER — LAB ENCOUNTER (OUTPATIENT)
Dept: LAB | Age: 39
End: 2024-09-09
Attending: NURSE PRACTITIONER
Payer: COMMERCIAL

## 2024-09-09 VITALS
TEMPERATURE: 98 F | DIASTOLIC BLOOD PRESSURE: 72 MMHG | SYSTOLIC BLOOD PRESSURE: 123 MMHG | HEART RATE: 66 BPM | BODY MASS INDEX: 30.36 KG/M2 | RESPIRATION RATE: 18 BRPM | HEIGHT: 62 IN | OXYGEN SATURATION: 100 % | WEIGHT: 165 LBS

## 2024-09-09 DIAGNOSIS — A09 INFECTIOUS ENTERITIS, UNSPECIFIED INFECTIOUS AGENT: ICD-10-CM

## 2024-09-09 DIAGNOSIS — R19.7 DIARRHEA, UNSPECIFIED TYPE: Primary | ICD-10-CM

## 2024-09-09 LAB
#MXD IC: 0.6 X10ˆ3/UL (ref 0.1–1)
B-HCG UR QL: NEGATIVE
BASOPHILS # BLD AUTO: 0.03 X10(3) UL (ref 0–0.2)
BASOPHILS NFR BLD AUTO: 0.5 %
BILIRUB UR QL STRIP: NEGATIVE
BUN BLD-MCNC: 17 MG/DL (ref 7–18)
CHLORIDE BLD-SCNC: 109 MMOL/L (ref 98–112)
CLARITY UR: CLEAR
CO2 BLD-SCNC: 17 MMOL/L (ref 21–32)
COLOR UR: YELLOW
CREAT BLD-MCNC: 1.1 MG/DL
EGFRCR SERPLBLD CKD-EPI 2021: 65 ML/MIN/1.73M2 (ref 60–?)
EOSINOPHIL # BLD AUTO: 0.07 X10(3) UL (ref 0–0.7)
EOSINOPHIL NFR BLD AUTO: 1.1 %
ERYTHROCYTE [DISTWIDTH] IN BLOOD BY AUTOMATED COUNT: 13.1 %
GLUCOSE BLD-MCNC: 73 MG/DL (ref 70–99)
GLUCOSE UR STRIP-MCNC: NEGATIVE MG/DL
HCT VFR BLD AUTO: 43.6 %
HCT VFR BLD AUTO: 45.6 %
HCT VFR BLD CALC: 45 %
HGB BLD-MCNC: 14.5 G/DL
HGB BLD-MCNC: 14.9 G/DL
IMM GRANULOCYTES # BLD AUTO: 0.01 X10(3) UL (ref 0–1)
IMM GRANULOCYTES NFR BLD: 0.2 %
ISTAT IONIZED CALCIUM FOR CHEM 8: 1.03 MMOL/L (ref 1.12–1.32)
KETONES UR STRIP-MCNC: NEGATIVE MG/DL
LEUKOCYTE ESTERASE UR QL STRIP: NEGATIVE
LYMPHOCYTES # BLD AUTO: 2.4 X10(3) UL (ref 1–4)
LYMPHOCYTES # BLD AUTO: 2.5 X10ˆ3/UL (ref 1–4)
LYMPHOCYTES NFR BLD AUTO: 36 %
LYMPHOCYTES NFR BLD AUTO: 36.5 %
MCH RBC QN AUTO: 27.1 PG (ref 26–34)
MCH RBC QN AUTO: 28.3 PG (ref 26–34)
MCHC RBC AUTO-ENTMCNC: 31.8 G/DL (ref 31–37)
MCHC RBC AUTO-ENTMCNC: 34.2 G/DL (ref 31–37)
MCV RBC AUTO: 82.7 FL
MCV RBC AUTO: 85.2 FL (ref 80–100)
MIXED CELL %: 8.4 %
MONOCYTES # BLD AUTO: 0.56 X10(3) UL (ref 0.1–1)
MONOCYTES NFR BLD AUTO: 8.4 %
NEUTROPHILS # BLD AUTO: 3.59 X10 (3) UL (ref 1.5–7.7)
NEUTROPHILS # BLD AUTO: 3.59 X10(3) UL (ref 1.5–7.7)
NEUTROPHILS # BLD AUTO: 3.8 X10ˆ3/UL (ref 1.5–7.7)
NEUTROPHILS NFR BLD AUTO: 53.8 %
NEUTROPHILS NFR BLD AUTO: 55.1 %
NITRITE UR QL STRIP: NEGATIVE
PH UR STRIP: 6 [PH]
PLATELET # BLD AUTO: 261 10(3)UL (ref 150–450)
POTASSIUM BLD-SCNC: 4.3 MMOL/L (ref 3.6–5.1)
PROT UR STRIP-MCNC: NEGATIVE MG/DL
RBC # BLD AUTO: 5.27 X10(6)UL
RBC # BLD AUTO: 5.35 X10ˆ6/UL
SODIUM BLD-SCNC: 137 MMOL/L (ref 136–145)
SP GR UR STRIP: >=1.03
UROBILINOGEN UR STRIP-ACNC: <2 MG/DL
WBC # BLD AUTO: 6.7 X10(3) UL (ref 4–11)
WBC # BLD AUTO: 6.9 X10ˆ3/UL (ref 4–11)

## 2024-09-09 PROCEDURE — 87427 SHIGA-LIKE TOXIN AG IA: CPT

## 2024-09-09 PROCEDURE — 87015 SPECIMEN INFECT AGNT CONCNTJ: CPT

## 2024-09-09 PROCEDURE — 74177 CT ABD & PELVIS W/CONTRAST: CPT | Performed by: NURSE PRACTITIONER

## 2024-09-09 PROCEDURE — 99214 OFFICE O/P EST MOD 30 MIN: CPT

## 2024-09-09 PROCEDURE — 81002 URINALYSIS NONAUTO W/O SCOPE: CPT

## 2024-09-09 PROCEDURE — 87046 STOOL CULTR AEROBIC BACT EA: CPT

## 2024-09-09 PROCEDURE — 87493 C DIFF AMPLIFIED PROBE: CPT

## 2024-09-09 PROCEDURE — 85025 COMPLETE CBC W/AUTO DIFF WBC: CPT | Performed by: NURSE PRACTITIONER

## 2024-09-09 PROCEDURE — 96360 HYDRATION IV INFUSION INIT: CPT

## 2024-09-09 PROCEDURE — 87045 FECES CULTURE AEROBIC BACT: CPT

## 2024-09-09 PROCEDURE — 80047 BASIC METABLC PNL IONIZED CA: CPT

## 2024-09-09 PROCEDURE — 81025 URINE PREGNANCY TEST: CPT

## 2024-09-09 PROCEDURE — 99215 OFFICE O/P EST HI 40 MIN: CPT

## 2024-09-09 RX ORDER — DICYCLOMINE HCL 20 MG
20 TABLET ORAL 4 TIMES DAILY PRN
Qty: 30 TABLET | Refills: 0 | Status: SHIPPED | OUTPATIENT
Start: 2024-09-09 | End: 2024-10-09

## 2024-09-09 RX ORDER — SODIUM CHLORIDE 9 MG/ML
1000 INJECTION, SOLUTION INTRAVENOUS ONCE
Status: COMPLETED | OUTPATIENT
Start: 2024-09-09 | End: 2024-09-09

## 2024-09-09 NOTE — ED PROVIDER NOTES
Patient Seen in: [facility]      History     Chief Complaint   Patient presents with    Diarrhea     Stated Complaint: Stomach/Diarrhea issue    Subjective:   HPI  39-year-old with bipolar, neurocardiogenic syndrome, polycystic disease of the ovaries, who has had a cholecystectomy presents complaining of diarrhea that started yesterday at 4 PM.  She has left-sided abdominal pain.  She denies any vomiting.  She denies black or bloody stools.  She has a history of diverticulosis.    Objective:   Past Medical History:    Anesthesia complication    HARD TO AWAKEN    Asthma (HCC)    due bouts of flu    Bipolar I disorder, single manic episode (HCC)    Neurocardiogenic syncope    had as teen, outgrew    Polycystic disease, ovaries    PONV (postoperative nausea and vomiting)    Post-cholecystectomy syndrome              Past Surgical History:   Procedure Laterality Date          Cholecystectomy  \    Colonoscopy N/A 10/15/2015    Procedure: COLONOSCOPY;  Surgeon: Emiyl Gandhi MD;  Location:  ENDOSCOPY    Colonoscopy  2021    Trisha, Normal, rpt 10 yrs    Egd  2021    Trisha, Neg celiac/hpylori    Hernia surgery      Other  WISDOM TEETH    Other surgical history      gallbladder sx    Tonsillectomy      Ventral hernia repair N/A 2015    Procedure: HERNIA VENTRAL REPAIR;  Surgeon: Arnaldo Jefferson MD;  Location:  MAIN OR                No pertinent social history.            Review of Systems   All other systems reviewed and are negative.      Positive for stated Chief Complaint: Diarrhea    Other systems are as noted in HPI.  Constitutional and vital signs reviewed.      All other systems reviewed and negative except as noted above.    Physical Exam     ED Triage Vitals [24 1019]   /86   Pulse 82   Resp 16   Temp 97.9 °F (36.6 °C)   Temp src Temporal   SpO2 100 %   O2 Device None (Room air)       Current Vitals:   Vital Signs  BP: 123/72  Pulse:  66  Resp: 18  Temp: 97.9 °F (36.6 °C)  Temp src: Temporal    Oxygen Therapy  SpO2: 100 %  O2 Device: None (Room air)            Physical Exam  Vitals and nursing note reviewed.   Constitutional:       General: She is not in acute distress.     Appearance: She is well-developed. She is not ill-appearing or toxic-appearing.   HENT:      Mouth/Throat:      Mouth: Mucous membranes are dry.   Cardiovascular:      Rate and Rhythm: Normal rate and regular rhythm.      Heart sounds: Normal heart sounds.   Pulmonary:      Effort: Pulmonary effort is normal.      Breath sounds: Normal breath sounds.   Abdominal:      General: Bowel sounds are normal.      Tenderness: There is abdominal tenderness (Left upper and lower quadrant tenderness).   Skin:     General: Skin is warm and dry.   Neurological:      Mental Status: She is alert and oriented to person, place, and time.               ED Course     Labs Reviewed   POCT CBC - Abnormal; Notable for the following components:       Result Value    RBC IC 5.35 (*)     All other components within normal limits   [facility] POCT URINALYSIS DIPSTICK - Abnormal; Notable for the following components:    Blood, Urine Trace-Intact (*)     All other components within normal limits   POCT ISTAT CHEM8 CARTRIDGE - Abnormal; Notable for the following components:    ISTAT Ionized Calcium 1.03 (*)     ISTAT TCO2 17 (*)     ISTAT Creatinine 1.10 (*)     All other components within normal limits   POCT PREGNANCY URINE - Normal   CBC W AUTO DIFF           CT ABDOMEN+PELVIS(CONTRAST ONLY)(CPT=74177)    Result Date: 9/9/2024  PROCEDURE:  CT ABDOMEN+PELVIS (CONTRAST ONLY) (CPT=74177)  COMPARISON:  [facility], CT, CT ABDOMEN PELVIS IV CONTRAST, NO ORAL (ER), 8/19/2023, 5:54 PM.  INDICATIONS:  left sided tenderness  TECHNIQUE:  CT scanning was performed from the dome of the diaphragm to the pubic symphysis with non-ionic intravenous contrast material. Post contrast coronal MPR imaging was performed.  Dose  reduction techniques were used. Dose information is transmitted to the ACR (American College of Radiology) NRDR (National Radiology Data Registry) which includes the Dose Index Registry.  PATIENT STATED HISTORY:(As transcribed by Technologist)  Patient states LUQ, mid abd pain, diarrhea, dehydration, belching since yesterday afternoon.    CONTRAST USED:  90cc of Isovue 370  FINDINGS:    LIVER:  Tiny cyst right hepatic lobe image 27  BILIARY:  Cholecystectomy.  PANCREAS:  Unremarkable.  SPLEEN:  Unremarkable.  KIDNEYS:  No acute abnormality.  Small cyst left kidney  ADRENALS:  Unremarkable.  AORTA/VASCULAR:  No aortic aneurysm.  RETROPERITONEUM:  Unremarkable.  BOWEL/MESENTERY: No acute process.  Semi liquid stool seen in the colon.  No sign of colitis or diverticulitis.  No bowel obstruction ascites or free air.  Normal appendix size.  ABDOMINAL WALL:  Unremarkable.  URINARY BLADDER:  Unremarkable.  LYMPH NODES PELVIS:  Unremarkable.  PELVIC ORGANS:  No acute process.  LUNG BASES:  No acute process.  BONES:  No acute abnormality.              CONCLUSION:  Correlate for enteritis with semi liquid stool in the colon without sign of obstruction, colitis, abscess or bowel obstruction.  No specific left upper quadrant abnormality.  Normal size of spleen.   LOCATION:  [facility]   Dictated by (CST): Rommel Alves MD on 9/09/2024 at 12:41 PM     Finalized by (CST): Rommel Alves MD on 9/09/2024 at 12:44 PM               [facility]     Medical Decision Making  39-year-old with bipolar, neurocardiogenic syndrome, polycystic disease of the ovaries, who has had a cholecystectomy presents complaining of diarrhea that started yesterday at 4 PM.  She has left-sided abdominal pain.  She denies any vomiting.  She denies black or bloody stools.  She has a history of diverticulosis.    Pertinent Labs & Imaging studies reviewed. (See chart for details).  Patient coming in with abdominal pain, diarrhea.   Differential diagnosis includes but  not limited to abdominal pain, diverticulitis, diarrhea, infectious diarrhea  Labs reviewed CBC normal.  CHEM with mildly elevated creatinine.  Liter of saline was given. Radiology  Correlate for enteritis with semi liquid stool in the colon without sign of obstruction, colitis, abscess or bowel obstruction.  No specific left upper quadrant abnormality.  Normal size of spleen.    Will treat for diarrhea Bentyl with outpatient stool samples.  Will discharge on Bentyl with outpatient stool samples. Patient/Parent is comfortable with this plan.    Overall Pt looks good. Non-toxic, well-hydrated and in no respiratory distress. Vital signs are reassuring. Exam is reassuring. I do not believe pt requires and additional diagnostic studies or intervention. I believe pt can be discharged home to continue evaluation as an outpatient. Follow-up provider given. Discharge instructions given and reviewed. Return for any problems. All understand and agree with the plan.        Problems Addressed:  Diarrhea, unspecified type: acute illness or injury  Infectious enteritis, unspecified infectious agent: acute illness or injury        Disposition and Plan     Clinical Impression:  1. Diarrhea, unspecified type    2. Infectious enteritis, unspecified infectious agent         Disposition:  Discharge  9/9/2024 12:52 pm    Follow-up:  No follow-up provider specified.        Medications Prescribed:  Discharge Medication List as of 9/9/2024 12:57 PM        START taking these medications    Details   dicyclomine 20 MG Oral Tab Take 1 tablet (20 mg total) by mouth 4 (four) times daily as needed., Normal, Disp-30 tablet, R-0

## 2024-09-09 NOTE — DISCHARGE INSTRUCTIONS
Increase oral fluids.  [place] diet and advance as tolerated.  Turn in stool samples.  Take Bentyl as needed.

## 2024-09-09 NOTE — ED INITIAL ASSESSMENT (HPI)
Pt states having diarrhea since 1600 yesterday.     Denies n/v.      Denies any abd pain, states burping.  Denies fever.

## 2024-09-10 LAB — C DIFF TOX B STL QL: NEGATIVE

## 2024-10-07 ENCOUNTER — OFFICE VISIT (OUTPATIENT)
Dept: INTERNAL MEDICINE CLINIC | Facility: CLINIC | Age: 39
End: 2024-10-07
Payer: COMMERCIAL

## 2024-10-07 VITALS
BODY MASS INDEX: 36.82 KG/M2 | DIASTOLIC BLOOD PRESSURE: 74 MMHG | RESPIRATION RATE: 16 BRPM | HEIGHT: 61 IN | WEIGHT: 195 LBS | SYSTOLIC BLOOD PRESSURE: 120 MMHG | HEART RATE: 86 BPM

## 2024-10-07 DIAGNOSIS — E66.812 CLASS 2 SEVERE OBESITY WITH SERIOUS COMORBIDITY AND BODY MASS INDEX (BMI) OF 37.0 TO 37.9 IN ADULT, UNSPECIFIED OBESITY TYPE (HCC): ICD-10-CM

## 2024-10-07 DIAGNOSIS — Z51.81 ENCOUNTER FOR THERAPEUTIC DRUG MONITORING: Primary | ICD-10-CM

## 2024-10-07 DIAGNOSIS — E66.01 CLASS 2 SEVERE OBESITY WITH SERIOUS COMORBIDITY AND BODY MASS INDEX (BMI) OF 37.0 TO 37.9 IN ADULT, UNSPECIFIED OBESITY TYPE (HCC): ICD-10-CM

## 2024-10-07 DIAGNOSIS — E88.819 INSULIN RESISTANCE: ICD-10-CM

## 2024-10-07 PROCEDURE — 3078F DIAST BP <80 MM HG: CPT | Performed by: NURSE PRACTITIONER

## 2024-10-07 PROCEDURE — 99213 OFFICE O/P EST LOW 20 MIN: CPT | Performed by: NURSE PRACTITIONER

## 2024-10-07 PROCEDURE — 3074F SYST BP LT 130 MM HG: CPT | Performed by: NURSE PRACTITIONER

## 2024-10-07 PROCEDURE — 3008F BODY MASS INDEX DOCD: CPT | Performed by: NURSE PRACTITIONER

## 2024-10-07 NOTE — PATIENT INSTRUCTIONS
Continue making lifestyle changes that focus on good nutrition, regular exercise and stress management.    Medication Plan: Continue current medication regimen with plans to increase Zepbound to 12.5 mg weekly x4 weeks and then full maintenance dose of 15 mg weekly.    Next steps to work on before next office visit include: Keep up the great work with intentional focus on building and maintaining a healthy lifestyle routine.    Building a Healthy Routine for a Healthy Lifestyle  March 3, 2023  Posted in Blog, Motivation, Support  By [employer]    There are some people who seem to get everything done. They stay on top of tasks at work, manage their health and even spend time doing family activities. These people make it seem easy as they effortlessly go through life without missing a beat. Others seem to always be playing catch-up, feeling constantly behind and consistently stressed.    What’s the difference between these two people? Building a healthy routine.    What is a Routine?  A routine is a set of actions you do over and over again, consistently. Most people have many routines in their lives. For example, we make routines out of making coffee in the morning and driving to work. Turning on your computer and checking emails as your first work task is also a routine. These are things you do automatically as part of your day.    However, not all routines are healthy. For instance, you can make an unhealthy routine out of going through the drive-thru for breakfast each morning. Maybe you routinely spend an hour or more watching television on the couch each night.    If you find yourself caught in one or more unhealthy routines, take some time to see how you can improve your lifestyle. Building a set of healthy routines throughout your day can make a big difference in both your physical and mental health.    Tips for Building a Healthy Routine  How do you get started? Building a healthy routine  can be overwhelming to think about in the beginning stages, so keep these fundamental steps in mind.    Decide what’s important. Your routine can include a lot of different behaviors, so take some time to decide what your priorities are. Are you trying to eat better? Perhaps start by meal prepping on Sundays, making a balanced breakfast each morning or drinking at least three cups of water before lunchtime.    Determine where your routines should fit in. It’s sometimes obvious where in your lifestyle you need to develop healthier routines. It’s also not so obvious some of the time. Take a while to think about what parts of your day need some work. If you struggle to wake up or feel motivated in the morning, perhaps you could add a quick 10-minute walk to your routine. If your lunch breaks are usually spent sitting down at a desk or table, perhaps you could plan a short walk for this time instead.    Give your routine a try. Try your new routine for a few days and see how it’s working. If all is going well, keep your routine up. Eventually, it will become habitual and a lot easier to stick to. If your routine isn’t working out, don’t feel any shame! Modify it and keep experimenting.    Don’t get overwhelmed. Building a routine that works for you can take time. Don’t expect for it to happen overnight. Give yourself some time to practice and build consistency. Save plenty of room for modification and for celebrating success!    Routines That Can Improve Your Health  Need a few ideas for routines that can help you improve your health? Try one or more of the tips below and see the difference they can make in your life.    Get up a little earlier. Mornings can be stressful when we rush to get everything together for the day. We’ve all had moments where chaotic mornings make it hard to even walk out the door! Having a few extra minutes in the morning can be a game-changer. Try setting your alarm for 30 minutes earlier to  allow yourself time to sip on coffee, review your agenda, throw some dinner in a crock pot, write in a journal, or do whatever you need to do you can start your day on a positive note.    Prioritize sleep. People function better when they’re well-rested. Sleep should be a priority in your life, but many of us are guilty of letting ours fall to the wayside. Try making a bedtime ritual to ensure you are getting seven to eight hours of sleep each night. You will feel more energized and focused.    Make a plan. Writing a to-do list in the morning or the night before can help you stay on top of your responsibilities. Being able to see all your tasks in one place can help you stay focused and organized. For bonus points, delegate a task or two to your family. If someone else can do the grocery shopping while you run other errands or fold laundry, this can save you a lot of time and stress!     Don’t feel ashamed to ask for help.  We all have different personalities, schedules, likes and dislikes. Each one of our routines will be different, so try not to compare yourself to others. What will yours look like?      Holiday Weight and How to Avoid It    [employer] by Elisabeth Plasencia, PhD  https://www.obesityaction.org/resources/holiday-weight-and-ktk-ou-ofheh-it/      When we think about the holidays many things come to mind - gifts, shopping, parties, family, decorating, long to-do lists --and delicious holiday treats.    Strategies for Avoiding Holiday Weight Gain  The holiday season is a busy time, and there are eating opportunities everywhere we go, such as family gatherings, office parties with trays of home-baked treats in the lunchroom, holiday and [id number] programs at our kids’ schools, treat samples being given away as we make our way through the stores to do our holiday shopping and catalogs in our mailboxes with mouth-watering photo spreads on every page. We’re really busy, perhaps too busy to  prepare the healthy meals we might otherwise prepare.    Here are a few tips that will help you negotiate this joyful time with minimum risk to your weight management goals:    Focus on maintaining your current weight. Challenging yourself to lose weight over the holidays is setting yourself up for failure.  Don’t gorge on any special holiday food because you only get to eat it once a year. With luck, you’ll still be around to enjoy it next year. On the other hand, don’t deprive yourself of anything you want to taste. Instead, take a mindful bite, savoring the sight, taste, aroma, mouth feel and sound of each special holiday treat. Eating like this leads to increased pleasure, quicker satisfaction and decreased risk for weight gain.  Avoid the trap of thinking you can eat what you want because you can just start over in the New Year. It doesn’t get any easier just because it’s January - there are always other reasons to indulge and to celebrate.  Keep up your exercise routine. This will also help reduce holiday stress.  Keep tabs on yourself. Write down what you eat, weigh yourself if you want to or try on your favorite clothes to make sure they still fit.  Create meaning beyond the food by creating new traditions that have nothing to do with food. For example, change “in our family we always have chocolate cinnamon bread with whipped cream on [place] morning” into “in our family we always play in the snow (or on the beach), or go for a long walk/take food and gifts to the homeless shelter on [place] morning.”  Sometimes, eating a particular food is our way of remembering a lost loved one. If that applies to you, find another way to remember them, like sharing memories with family members.  Remember all the reasons why reaching and maintaining a healthy weight is important to you.  Remember, unless you’re an elite athlete, you’re unlikely to be able to “exercise off” weeks of overindulgence.  Strategies for  Holiday Parties  Most of us love holiday parties and look forward to them all year. We get to dress up and go to nice places, spend time with our nearest and dearest, enjoy our favorite holiday music and engage in the traditions that are meaningful to us. Despite all of the excitement, parties can also be minefields when it comes to honoring our healthy lifestyle goals. When we love parties, we may over-indulge as a way of intensifying the positive emotions we’re already feeling, and when we dislike parties, we may over-indulge in an effort to distract ourselves from the emotional discomfort that we’re feeling.    Here are a few tips that will help you get through holiday parties without sabotaging your goals:    Avoid wearing baggy clothing that allows you to expand as you eat.  After you’ve eaten, stay away from the food tables at the party.  Keep your hands busy by finding a way to help out. It’s the best way to distract yourself from the food.  Avoid alcohol. When we drink, we’re more likely to abandon healthy eating.  Fill up with water and other low-calorie drinks.  Take a healthy dish for the pot luck - something you can eat: consider salad, fruit, raw vegetables and a healthy dip.  Focus on your relationships, not on the food - learn to focus on enjoying the people and the special holiday experiences, on building special memories for yourself and your family.  Meeting new people is another good way of distracting yourself from the food. If you’re shy, simply be a good listener.  Plan ahead. The best kind of plan, when it comes to food, is about what you are going to eat - not about what you’re not going to eat. If we focus on what we can’t eat (or what we think we shouldn’t eat), this kind of thinking can set us up for failure because it simply leaves us feeling deprived.  Don’t arrive completely famished - you’ll be more likely to eat in a way you’ll later regret. Plan to eat on the light side both before  and after the event. Think about your meal plan for the day, and leave yourself some room to eat at the party.  Coping with Holiday Stress  As you know, the holiday season can be joyful and stressful at the same time. There’s so much to do, being around family can sometimes be difficult and often, we set ourselves the goal of creating the “perfect” holiday. Being stressed puts us at risk for stress-based eating in an effort to cope.    Here are some strategies you can use to reduce your stress levels:    Focus on what you’re grateful for.  Practice deep breathing whenever you feel overwhelmed.  Keep up your exercise routine.  Remind yourself to do just one thing at a time.  Remember -- you cannot do more than your best.  Be willing to say “no” to some events, tasks or requests. Sometimes this is the best way we can take care of ourselves.  Create a holiday season schedule for yourself. Schedule and prioritize everything you need to get done.  Reduce your expectations - aim for “good enough,” not “perfect.”  If you’re alone during the holidays, pamper yourself and find a way to help others who are less fortunate. This will help reduce your loneliness.  If your relationships with family members are strained, remember that over-indulging in your favorite holiday comfort foods is not going to change how they behave towards you!  Create fun times for yourself. Having fun is a great way of reducing stress!  I hope these tips will help you not just get through the holidays, but that they’ll allow you to feel reassured that you can still have a fun and meaningful time without having to sacrifice your weight management goals. Wishing you a happy, healthy and meaningful holiday season!

## 2024-10-07 NOTE — PROGRESS NOTES
Sosa Hayes is a 39 year old female presents today for follow-up on medical weight loss program for the treatment of overweight, obesity, or morbid obesity with associated IR.    S:  Current weight   Wt Readings from Last 6 Encounters:   10/07/24 195 lb (88.5 kg)   09/09/24 165 lb (74.8 kg)   03/18/24 169 lb (76.7 kg)   01/18/24 176 lb 6.4 oz (80 kg)   01/10/24 178 lb (80.7 kg)   11/20/23 177 lb (80.3 kg)    AND BMI Body mass index is 36.84 kg/m²..    Patient has lost 5# since LOV 3 month ago via VV and in clinic in 3/2024.  She has been compliant with therapy. Currently on last dose of Zepbound 10 mg weekly. Feeling less joint pain and inflammation from fibromyalgia that she has been able to stop her daily pain medication. Off Adderall for ADD d/t supply issues. Heartburn controlled on OTC PPI therapy. Stopped Metformin ER d/t feeling low sugar. Feeling good about success. No lifestyle log completed. PA daily with home shipping program. Typically eating 2 meals/day.    Testing/consult completed since LOV: Dietician: Estimated caloric needs for weight loss: 1675 cals/d for one pounds/week weight loss.      Social hx and PMH reviewed. 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 Spouse working on weight loss as well.    REVIEW OF SYSTEMS:  GENERAL: feels well otherwise  LUNGS: denies shortness of breath with exertion  CARDIOVASCULAR: denies chest pain on exertion, denies palpitations or pedal edema  GI: denies abdominal pain.  No N/V/D/C  MUSCULOSKELETAL: no acute joint or muscle pain, chronic pain  NEURO: denies headaches  PSYCH: denies change in behavior or mood, denies feeling sad or depressed.    EXAM:  /74   Pulse 86   Resp 16   Ht 5' 1\" (1.549 m)   Wt 195 lb (88.5 kg)   LMP 09/05/2024 (Within Days)   BMI 36.84 kg/m²   GENERAL: well developed, well nourished, in no apparent distress, obese  EYES: conjunctiva pink, sclera non icteric, PERRLA  LUNGS: CTA in all bajwa,  breathing non labored  CARDIO: RRR without murmur, normal S1 and S2 without clicks or gallops, no pedal edema.  GI: +BS  NEURO/MS: motor and sensory grossly intact  PSYCH: pleasant, cooperative, normal mood and affect    ASSESSMENT AND PLAN:  Reviewed Initial Weight Data and Goal Weight Loss:       Encounter Diagnoses   Name Primary?    Encounter for therapeutic drug monitoring Yes    Class 2 severe obesity with serious comorbidity and body mass index (BMI) of 37.0 to 37.9 in adult, unspecified obesity type (HCC)     Insulin resistance            No orders of the defined types were placed in this encounter.      Meds & Refills for this Visit:  Requested Prescriptions      No prescriptions requested or ordered in this encounter       Imaging & Consults:  None      Plan:  Patient has lost 5# since LOV in 3/2024 on Zepbound 10 mg weekly, off Metformin  mg daily (PCP) with a total weight loss of 34# since initial consult on 6/15/23 with initial weight of 198#. Weight loss goal:  reach 175-185# in 6 months and ultimately down to 170#.  CPM.  on holiday tips. See patient instructions below for additional plans and patient counseling.      Patient Instructions   Continue making lifestyle changes that focus on good nutrition, regular exercise and stress management.    Medication Plan: Continue current medication regimen with plans to increase Zepbound to 12.5 mg weekly x4 weeks and then full maintenance dose of 15 mg weekly.    Next steps to work on before next office visit include: Keep up the great work with intentional focus on building and maintaining a healthy lifestyle routine.    Building a Healthy Routine for a Healthy Lifestyle  March 3, 2023  Posted in Blog, Motivation, Support  By [employer] Campaign    There are some people who seem to get everything done. They stay on top of tasks at work, manage their health and even spend time doing family activities. These people make it seem easy as  they effortlessly go through life without missing a beat. Others seem to always be playing catch-up, feeling constantly behind and consistently stressed.    What’s the difference between these two people? Building a healthy routine.    What is a Routine?  A routine is a set of actions you do over and over again, consistently. Most people have many routines in their lives. For example, we make routines out of making coffee in the morning and driving to work. Turning on your computer and checking emails as your first work task is also a routine. These are things you do automatically as part of your day.    However, not all routines are healthy. For instance, you can make an unhealthy routine out of going through the drive-thru for breakfast each morning. Maybe you routinely spend an hour or more watching television on the couch each night.    If you find yourself caught in one or more unhealthy routines, take some time to see how you can improve your lifestyle. Building a set of healthy routines throughout your day can make a big difference in both your physical and mental health.    Tips for Building a Healthy Routine  How do you get started? Building a healthy routine can be overwhelming to think about in the beginning stages, so keep these fundamental steps in mind.    Decide what’s important. Your routine can include a lot of different behaviors, so take some time to decide what your priorities are. Are you trying to eat better? Perhaps start by meal prepping on Sundays, making a balanced breakfast each morning or drinking at least three cups of water before lunchtime.    Determine where your routines should fit in. It’s sometimes obvious where in your lifestyle you need to develop healthier routines. It’s also not so obvious some of the time. Take a while to think about what parts of your day need some work. If you struggle to wake up or feel motivated in the morning, perhaps you could add a quick 10-minute walk  to your routine. If your lunch breaks are usually spent sitting down at a desk or table, perhaps you could plan a short walk for this time instead.    Give your routine a try. Try your new routine for a few days and see how it’s working. If all is going well, keep your routine up. Eventually, it will become habitual and a lot easier to stick to. If your routine isn’t working out, don’t feel any shame! Modify it and keep experimenting.    Don’t get overwhelmed. Building a routine that works for you can take time. Don’t expect for it to happen overnight. Give yourself some time to practice and build consistency. Save plenty of room for modification and for celebrating success!    Routines That Can Improve Your Health  Need a few ideas for routines that can help you improve your health? Try one or more of the tips below and see the difference they can make in your life.    Get up a little earlier. Mornings can be stressful when we rush to get everything together for the day. We’ve all had moments where chaotic mornings make it hard to even walk out the door! Having a few extra minutes in the morning can be a game-changer. Try setting your alarm for 30 minutes earlier to allow yourself time to sip on coffee, review your agenda, throw some dinner in a crock pot, write in a journal, or do whatever you need to do you can start your day on a positive note.    Prioritize sleep. People function better when they’re well-rested. Sleep should be a priority in your life, but many of us are guilty of letting ours fall to the wayside. Try making a bedtime ritual to ensure you are getting seven to eight hours of sleep each night. You will feel more energized and focused.    Make a plan. Writing a to-do list in the morning or the night before can help you stay on top of your responsibilities. Being able to see all your tasks in one place can help you stay focused and organized. For bonus points, delegate a task or two to your family.  If someone else can do the grocery shopping while you run other errands or fold laundry, this can save you a lot of time and stress!     Don’t feel ashamed to ask for help.  We all have different personalities, schedules, likes and dislikes. Each one of our routines will be different, so try not to compare yourself to others. What will yours look like?      Holiday Weight and How to Avoid It    [employer] by Elisabeth Plasencia, PhD  https://www.obesityaction.org/resources/holiday-weight-and-tue-nj-rkhmx-it/      When we think about the holidays many things come to mind - gifts, shopping, parties, family, decorating, long to-do lists --and delicious holiday treats.    Strategies for Avoiding Holiday Weight Gain  The holiday season is a busy time, and there are eating opportunities everywhere we go, such as family gatherings, office parties with trays of home-baked treats in the lunchroom, holiday and [id number] programs at our kids’ schools, treat samples being given away as we make our way through the stores to do our holiday shopping and catalogs in our mailboxes with mouth-watering photo spreads on every page. We’re really busy, perhaps too busy to prepare the healthy meals we might otherwise prepare.    Here are a few tips that will help you negotiate this joyful time with minimum risk to your weight management goals:    Focus on maintaining your current weight. Challenging yourself to lose weight over the holidays is setting yourself up for failure.  Don’t gorge on any special holiday food because you only get to eat it once a year. With luck, you’ll still be around to enjoy it next year. On the other hand, don’t deprive yourself of anything you want to taste. Instead, take a mindful bite, savoring the sight, taste, aroma, mouth feel and sound of each special holiday treat. Eating like this leads to increased pleasure, quicker satisfaction and decreased risk for weight gain.  Avoid the trap of  thinking you can eat what you want because you can just start over in the New Year. It doesn’t get any easier just because it’s January - there are always other reasons to indulge and to celebrate.  Keep up your exercise routine. This will also help reduce holiday stress.  Keep tabs on yourself. Write down what you eat, weigh yourself if you want to or try on your favorite clothes to make sure they still fit.  Create meaning beyond the food by creating new traditions that have nothing to do with food. For example, change “in our family we always have chocolate cinnamon bread with whipped cream on Thurmond morning” into “in our family we always play in the snow (or on the beach), or go for a long walk/take food and gifts to the homeless shelter on Diana morning.”  Sometimes, eating a particular food is our way of remembering a lost loved one. If that applies to you, find another way to remember them, like sharing memories with family members.  Remember all the reasons why reaching and maintaining a healthy weight is important to you.  Remember, unless you’re an elite athlete, you’re unlikely to be able to “exercise off” weeks of overindulgence.  Strategies for Holiday Parties  Most of us love holiday parties and look forward to them all year. We get to dress up and go to nice places, spend time with our nearest and dearest, enjoy our favorite holiday music and engage in the traditions that are meaningful to us. Despite all of the excitement, parties can also be minefields when it comes to honoring our healthy lifestyle goals. When we love parties, we may over-indulge as a way of intensifying the positive emotions we’re already feeling, and when we dislike parties, we may over-indulge in an effort to distract ourselves from the emotional discomfort that we’re feeling.    Here are a few tips that will help you get through holiday parties without sabotaging your goals:    Avoid wearing baggy clothing that allows  you to expand as you eat.  After you’ve eaten, stay away from the food tables at the party.  Keep your hands busy by finding a way to help out. It’s the best way to distract yourself from the food.  Avoid alcohol. When we drink, we’re more likely to abandon healthy eating.  Fill up with water and other low-calorie drinks.  Take a healthy dish for the pot luck - something you can eat: consider salad, fruit, raw vegetables and a healthy dip.  Focus on your relationships, not on the food - learn to focus on enjoying the people and the special holiday experiences, on building special memories for yourself and your family.  Meeting new people is another good way of distracting yourself from the food. If you’re shy, simply be a good listener.  Plan ahead. The best kind of plan, when it comes to food, is about what you are going to eat - not about what you’re not going to eat. If we focus on what we can’t eat (or what we think we shouldn’t eat), this kind of thinking can set us up for failure because it simply leaves us feeling deprived.  Don’t arrive completely famished - you’ll be more likely to eat in a way you’ll later regret. Plan to eat on the light side both before and after the event. Think about your meal plan for the day, and leave yourself some room to eat at the party.  Coping with Holiday Stress  As you know, the holiday season can be joyful and stressful at the same time. There’s so much to do, being around family can sometimes be difficult and often, we set ourselves the goal of creating the “perfect” holiday. Being stressed puts us at risk for stress-based eating in an effort to cope.    Here are some strategies you can use to reduce your stress levels:    Focus on what you’re grateful for.  Practice deep breathing whenever you feel overwhelmed.  Keep up your exercise routine.  Remind yourself to do just one thing at a time.  Remember -- you cannot do more than your best.  Be willing to say “no” to some  events, tasks or requests. Sometimes this is the best way we can take care of ourselves.  Create a holiday season schedule for yourself. Schedule and prioritize everything you need to get done.  Reduce your expectations - aim for “good enough,” not “perfect.”  If you’re alone during the holidays, pamper yourself and find a way to help others who are less fortunate. This will help reduce your loneliness.  If your relationships with family members are strained, remember that over-indulging in your favorite holiday comfort foods is not going to change how they behave towards you!  Create fun times for yourself. Having fun is a great way of reducing stress!  I hope these tips will help you not just get through the holidays, but that they’ll allow you to feel reassured that you can still have a fun and meaningful time without having to sacrifice your weight management goals. Wishing you a happy, healthy and meaningful holiday season!        Medication use and SEs reviewed with patient.    Return in about 4 months (around 2/7/2025) for weight management via clinic or VV.    Patient verbalizes understanding.

## 2024-12-04 ENCOUNTER — PATIENT MESSAGE (OUTPATIENT)
Dept: INTERNAL MEDICINE CLINIC | Facility: CLINIC | Age: 39
End: 2024-12-04

## 2024-12-04 DIAGNOSIS — Z51.81 ENCOUNTER FOR THERAPEUTIC DRUG MONITORING: ICD-10-CM

## 2024-12-04 DIAGNOSIS — E88.819 INSULIN RESISTANCE: ICD-10-CM

## 2024-12-04 DIAGNOSIS — E66.812 CLASS 2 SEVERE OBESITY WITH SERIOUS COMORBIDITY AND BODY MASS INDEX (BMI) OF 37.0 TO 37.9 IN ADULT, UNSPECIFIED OBESITY TYPE (HCC): ICD-10-CM

## 2024-12-04 DIAGNOSIS — E28.2 PCOS (POLYCYSTIC OVARIAN SYNDROME): ICD-10-CM

## 2024-12-04 DIAGNOSIS — E66.01 CLASS 2 SEVERE OBESITY WITH SERIOUS COMORBIDITY AND BODY MASS INDEX (BMI) OF 37.0 TO 37.9 IN ADULT, UNSPECIFIED OBESITY TYPE (HCC): ICD-10-CM

## 2024-12-09 RX ORDER — TIRZEPATIDE 12.5 MG/.5ML
12.5 INJECTION, SOLUTION SUBCUTANEOUS WEEKLY
Qty: 2 ML | Refills: 2 | Status: SHIPPED | OUTPATIENT
Start: 2024-12-09

## 2024-12-13 DIAGNOSIS — Z51.81 ENCOUNTER FOR THERAPEUTIC DRUG MONITORING: ICD-10-CM

## 2024-12-13 DIAGNOSIS — E66.01 CLASS 2 SEVERE OBESITY WITH SERIOUS COMORBIDITY AND BODY MASS INDEX (BMI) OF 37.0 TO 37.9 IN ADULT, UNSPECIFIED OBESITY TYPE (HCC): ICD-10-CM

## 2024-12-13 DIAGNOSIS — E88.819 INSULIN RESISTANCE: ICD-10-CM

## 2024-12-13 DIAGNOSIS — E28.2 PCOS (POLYCYSTIC OVARIAN SYNDROME): ICD-10-CM

## 2024-12-13 DIAGNOSIS — E66.812 CLASS 2 SEVERE OBESITY WITH SERIOUS COMORBIDITY AND BODY MASS INDEX (BMI) OF 37.0 TO 37.9 IN ADULT, UNSPECIFIED OBESITY TYPE (HCC): ICD-10-CM

## 2024-12-13 RX ORDER — TIRZEPATIDE 15 MG/.5ML
INJECTION, SOLUTION SUBCUTANEOUS
Qty: 2 ML | Refills: 1 | OUTPATIENT
Start: 2024-12-13

## 2024-12-16 ENCOUNTER — TELEPHONE (OUTPATIENT)
Dept: INTERNAL MEDICINE CLINIC | Facility: CLINIC | Age: 39
End: 2024-12-16

## 2025-01-24 ENCOUNTER — PATIENT MESSAGE (OUTPATIENT)
Dept: INTERNAL MEDICINE CLINIC | Facility: CLINIC | Age: 40
End: 2025-01-24

## 2025-02-22 NOTE — PROGRESS NOTES
Sosa Hayes is a 39 year old female presents today for follow-up on medical weight loss program for the treatment of overweight, obesity, or morbid obesity with associated IR.    S:  Current weight   Wt Readings from Last 6 Encounters:   02/24/25 153 lb (69.4 kg)   10/07/24 195 lb (88.5 kg)   09/09/24 165 lb (74.8 kg)   03/18/24 169 lb (76.7 kg)   01/18/24 176 lb 6.4 oz (80 kg)   01/10/24 178 lb (80.7 kg)    AND BMI Body mass index is 28.91 kg/m²..    Patient has lost 12# since LOV 4 month ago (last visit weight incorrect at 195#). She has been compliant with therapy. Feeling better on 12.5 mg weekly vs 15. Was experiencing diarrhea and GERD. Now taking PPI therapy PRN. Joint/muscle pains resolved and no longer needing daily pain meds. Feeling good at current weight and would like to maintain.    Testing/consult completed since LOV: Dietician: Estimated caloric needs for weight loss: 1675 cals/d for one pounds/week weight loss.    Social hx and PMH reviewed. 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 Spouse working on weight loss as well.    [facility] Up      Question 2/24/2025 10:01 AM CST - Filed by Patient   Please describe a success moment: Finally getting below 160   Please describe a challenging moment/needs for improvement: Remembering to eat   Please complete this 24 hour food journal, listing everything you had to eat in the past day. Include the average time of day you ate these meals at    List foods, qty and prep for breakfast: Bowl of cereal almond milk, 20 g protein shake   List foods, qty and prep for lunch. 3 eggs, 3 slices whole grain bread, 30 g protein shake   List foods, qty and prep for dinner. Steamed veg   List foods, qty and prep for snacks.    List the types and qty of fluids consumed 40 oz water   On average, how many meals did you eat out per week? 1   Exercise    How many days per week are you active or exercise 5   On average, how many  days were anaerobic (strength/resistance) exercises performed? 2   On average, how many days were aerobic (cardio) exercises performed? 3   Perceived level of exertion on a scale of 1-5, with 5 being very intense: 2   Stress    Average stress level on a scale of 1-10, with 10 being extremely stressed: 4   If greater than 5/1O how would you grade your coping mechanisms? fair   Sleep hours and integrity    How many hours of uninterrupted sleep do you get a night: 6   Do you feel rested in the morning: Yes   If no, what may have been disrupting your sleep?    Please list any goal(s) for your next visit        REVIEW OF SYSTEMS:  GENERAL: feels well otherwise  LUNGS: denies shortness of breath with exertion  CARDIOVASCULAR: denies chest pain on exertion, denies palpitations or pedal edema  GI: denies abdominal pain.  No N/V/D/C. See above  MUSCULOSKELETAL: no acute joint or muscle pain. See above  NEURO: denies headaches  PSYCH: denies change in behavior or mood, denies feeling sad or depressed.    EXAM:  /72   Pulse 82   Resp 16   Ht 5' 1\" (1.549 m)   Wt 153 lb (69.4 kg)   LMP 02/09/2025 (Within Days)   BMI 28.91 kg/m²   GENERAL: well developed, well nourished, in no apparent distress, overweight  EYES: conjunctiva pink, sclera non icteric, PERRLA  LUNGS: CTA in all fields, breathing non labored  CARDIO: RRR without murmur, normal S1 and S2 without clicks or gallops, no pedal edema.  GI: +BS  NEURO/MS: motor and sensory grossly intact  PSYCH: pleasant, cooperative, normal mood and affect    ASSESSMENT AND PLAN:  Reviewed Initial Weight Data and Goal Weight Loss:       Encounter Diagnoses   Name Primary?    Encounter for therapeutic drug monitoring Yes    Overweight (BMI 25.0-29.9)     PCOS (polycystic ovarian syndrome)     Gastroesophageal reflux disease, unspecified whether esophagitis present     Fibromyalgia     History of obesity              No orders of the defined types were placed in this  encounter.      Meds & Refills for this Visit:  Requested Prescriptions     Signed Prescriptions Disp Refills    Tirzepatide-Weight Management (ZEPBOUND) 12.5 MG/0.5ML Subcutaneous Solution Auto-injector 6 mL 1     Sig: Inject 12.5 mg into the skin once a week.       Imaging & Consults:  None      Plan:  Patient has lost 12# since LOV 4 months ago on Zepbound 12.5 mg weekly with a total weight loss of 45# since initial consult on 6/15/23 with initial weight of 198#. Weight loss goal:  maintain weight loss. CPM. GI SEs on Zepbound 15 mg weekly.  on weight maintenance. See patient instructions below for additional plans and patient counseling.      Patient Instructions   Continue making lifestyle changes that focus on good nutrition, regular exercise and stress management.    Medication Plan: Continue Zepbound at 12.5 mg weekly. If insurance will not pay for continued transition dose then will plan to reduce to 10 mg weekly.    Next steps to work on before next office visit include: Continue focus on maintaining a healthy lifestyle. Option to have body composition rechecked at our [facility] by call our main office there and scheduling a nurse visit to do so.     I've Reached My Goal Weight! Now How Do I Maintain it?  February 1, 2019  Posted in Blog, Motivation  By Your Weight Mat[employer] Campaign     Congratulations! After what (might) feel like eons of hard, excruciating work -- physically and mentally -- you see a healthy number on the scale that leaves you glowing with pride. You're at the top of a mountain which took a whole lot of effort, endurance and determination to climb. But you did it! And now, you're left with the grueling question, “What comes next?”  Welcome to Weight Maintenance  There's no time to grow complacent, because you might find yourself sliding off the rails -- and you wouldn't want to unravel the difficult pounds you've successfully shed.  You've made it to the weight maintenance  phase, but don't underestimate it. Did you know that nearly 65 percent of dieters return to their pre-dieting weight within three years?  In fact, for some, this stage of weight-loss can be even more difficult than losing the pounds to begin with. It might be because our bodies are biologically designed with a protection plan in place. Essentially, it defends its fat stores, looking for energy to replace what is lost. So, you've got to stay ahead of the curve and develop a protection plan of your own.  How to Maintain Your Goal Weight:  1) Schedule a Health Exam.  Remember, it's not all about the number on the scale! Schedule an exam with your primary care physician for an eval of your health. If there are things you can still improve upon, like cholesterol or blood pressure, incorporate those goals into your weight management plan.  2) Keep Stepping on the Scale.  Don't even think about tossing the scale -- you still need it. Make sure you step on it regularly, whether it's daily or weekly, to keep a close eye on the number and how it's trending. You should be able to catch any red flags before they become a problem.  3) Set Health Challenges.  There's always room to improve on your health. Could you work on your strength? Speed? Endurance? Have you ever wanted to participate in a marathon or a triathlon? Ask yourself, “Beyond losing weight, what else can I do to be the best I can be?”  4) Develop a New Skill  Step outside your comfort zone and learn to do something you've never done before. Perhaps it's a marathon or triathlon. Maybe it's a dance class, spin class or even a full-on hiking excursion. Pick a skill or activity that will inspire you, motivate you and keep you pushing!  5) Keep Adjusting Your Behaviors  Even though you've lost the weight, it's important to keep an eye on maintaining healthy habits. This includes nutrition, exercise, sleep, stress management and more -- you know the drill.  The Work  Doesn't End  Develop and cultivate the mindset that your weight-loss journey is one you'll stay on forever -- and it doesn't just end when you've reached your goal weight. Keeping the weight off is just the second leg of the adventure! Continue to measure non-scale victories and seek new, creative ways to boost your health and crush your goals. You've got this -- now keep up the great work!      Medication use and SEs reviewed with patient.    Return in about 6 months (around 8/24/2025) for weight management via clinic.    Patient verbalizes understanding.    Answers submitted by the patient for this visit:  Medical Weight Loss Follow Up (Submitted on 2/24/2025)  If greater than 5/1O how would you grade your coping mechanisms?: fair

## 2025-02-24 ENCOUNTER — OFFICE VISIT (OUTPATIENT)
Dept: INTERNAL MEDICINE CLINIC | Facility: CLINIC | Age: 40
End: 2025-02-24
Payer: COMMERCIAL

## 2025-02-24 VITALS
RESPIRATION RATE: 16 BRPM | HEIGHT: 61 IN | DIASTOLIC BLOOD PRESSURE: 72 MMHG | HEART RATE: 82 BPM | WEIGHT: 153 LBS | BODY MASS INDEX: 28.89 KG/M2 | SYSTOLIC BLOOD PRESSURE: 112 MMHG

## 2025-02-24 DIAGNOSIS — E66.3 OVERWEIGHT (BMI 25.0-29.9): ICD-10-CM

## 2025-02-24 DIAGNOSIS — Z86.39 HISTORY OF OBESITY: ICD-10-CM

## 2025-02-24 DIAGNOSIS — E28.2 PCOS (POLYCYSTIC OVARIAN SYNDROME): ICD-10-CM

## 2025-02-24 DIAGNOSIS — M79.7 FIBROMYALGIA: ICD-10-CM

## 2025-02-24 DIAGNOSIS — Z51.81 ENCOUNTER FOR THERAPEUTIC DRUG MONITORING: Primary | ICD-10-CM

## 2025-02-24 DIAGNOSIS — K21.9 GASTROESOPHAGEAL REFLUX DISEASE, UNSPECIFIED WHETHER ESOPHAGITIS PRESENT: ICD-10-CM

## 2025-02-24 PROCEDURE — 3078F DIAST BP <80 MM HG: CPT | Performed by: NURSE PRACTITIONER

## 2025-02-24 PROCEDURE — 99213 OFFICE O/P EST LOW 20 MIN: CPT | Performed by: NURSE PRACTITIONER

## 2025-02-24 PROCEDURE — 3074F SYST BP LT 130 MM HG: CPT | Performed by: NURSE PRACTITIONER

## 2025-02-24 PROCEDURE — 3008F BODY MASS INDEX DOCD: CPT | Performed by: NURSE PRACTITIONER

## 2025-02-24 RX ORDER — TIRZEPATIDE 12.5 MG/.5ML
12.5 INJECTION, SOLUTION SUBCUTANEOUS WEEKLY
Qty: 6 ML | Refills: 1 | Status: SHIPPED | OUTPATIENT
Start: 2025-02-24

## 2025-02-24 RX ORDER — DEXTROAMPHETAMINE SACCHARATE, AMPHETAMINE ASPARTATE MONOHYDRATE, DEXTROAMPHETAMINE SULFATE AND AMPHETAMINE SULFATE 1.25; 1.25; 1.25; 1.25 MG/1; MG/1; MG/1; MG/1
CAPSULE, EXTENDED RELEASE ORAL
COMMUNITY
Start: 2025-01-01

## 2025-02-24 NOTE — PATIENT INSTRUCTIONS
Continue making lifestyle changes that focus on good nutrition, regular exercise and stress management.    Medication Plan: Continue Zepbound at 12.5 mg weekly. If insurance will not pay for continued transition dose then will plan to reduce to 10 mg weekly.    Next steps to work on before next office visit include: Continue focus on maintaining a healthy lifestyle. Option to have body composition rechecked at our [facility] by call our main office there and scheduling a nurse visit to do so.     I've Reached My Goal Weight! Now How Do I Maintain it?  February 1, 2019  Posted in Blog, Motivation  By Your Weight Mat[employer] Campaign     Congratulations! After what (might) feel like eons of hard, excruciating work -- physically and mentally -- you see a healthy number on the scale that leaves you glowing with pride. You're at the top of a mountain which took a whole lot of effort, endurance and determination to climb. But you did it! And now, you're left with the grueling question, “What comes next?”  Welcome to Weight Maintenance  There's no time to grow complacent, because you might find yourself sliding off the rails -- and you wouldn't want to unravel the difficult pounds you've successfully shed.  You've made it to the weight maintenance phase, but don't underestimate it. Did you know that nearly 65 percent of dieters return to their pre-dieting weight within three years?  In fact, for some, this stage of weight-loss can be even more difficult than losing the pounds to begin with. It might be because our bodies are biologically designed with a protection plan in place. Essentially, it defends its fat stores, looking for energy to replace what is lost. So, you've got to stay ahead of the curve and develop a protection plan of your own.  How to Maintain Your Goal Weight:  1) Schedule a Health Exam.  Remember, it's not all about the number on the scale! Schedule an exam with your primary care physician for an  eval of your health. If there are things you can still improve upon, like cholesterol or blood pressure, incorporate those goals into your weight management plan.  2) Keep Stepping on the Scale.  Don't even think about tossing the scale -- you still need it. Make sure you step on it regularly, whether it's daily or weekly, to keep a close eye on the number and how it's trending. You should be able to catch any red flags before they become a problem.  3) Set Health Challenges.  There's always room to improve on your health. Could you work on your strength? Speed? Endurance? Have you ever wanted to participate in a marathon or a triathlon? Ask yourself, “Beyond losing weight, what else can I do to be the best I can be?”  4) Develop a New Skill  Step outside your comfort zone and learn to do something you've never done before. Perhaps it's a marathon or triathlon. Maybe it's a dance class, spin class or even a full-on hiking excursion. Pick a skill or activity that will inspire you, motivate you and keep you pushing!  5) Keep Adjusting Your Behaviors  Even though you've lost the weight, it's important to keep an eye on maintaining healthy habits. This includes nutrition, exercise, sleep, stress management and more -- you know the drill.  The Work Doesn't End  Develop and cultivate the mindset that your weight-loss journey is one you'll stay on forever -- and it doesn't just end when you've reached your goal weight. Keeping the weight off is just the second leg of the adventure! Continue to measure non-scale victories and seek new, creative ways to boost your health and crush your goals. You've got this -- now keep up the great work!

## 2025-03-12 ENCOUNTER — OFFICE VISIT (OUTPATIENT)
Dept: FAMILY MEDICINE CLINIC | Facility: CLINIC | Age: 40
End: 2025-03-12
Payer: COMMERCIAL

## 2025-03-12 VITALS
HEART RATE: 90 BPM | RESPIRATION RATE: 16 BRPM | SYSTOLIC BLOOD PRESSURE: 100 MMHG | DIASTOLIC BLOOD PRESSURE: 70 MMHG | OXYGEN SATURATION: 98 % | WEIGHT: 153 LBS | TEMPERATURE: 98 F | BODY MASS INDEX: 29 KG/M2

## 2025-03-12 DIAGNOSIS — R50.9 FEVER, UNSPECIFIED FEVER CAUSE: ICD-10-CM

## 2025-03-12 DIAGNOSIS — Z01.89 PATIENT REQUESTED DIAGNOSTIC TESTING: ICD-10-CM

## 2025-03-12 DIAGNOSIS — R68.89 FLU-LIKE SYMPTOMS: Primary | ICD-10-CM

## 2025-03-12 PROCEDURE — 87880 STREP A ASSAY W/OPTIC: CPT

## 2025-03-12 PROCEDURE — 99213 OFFICE O/P EST LOW 20 MIN: CPT

## 2025-03-12 PROCEDURE — 3074F SYST BP LT 130 MM HG: CPT

## 2025-03-12 PROCEDURE — 3078F DIAST BP <80 MM HG: CPT

## 2025-03-12 NOTE — PROGRESS NOTES
Subjective:   Patient ID: Sosa Hayes is a 39 year old female.    Patient presents with complaint of fevers (tmax 102), body aches, and ear pressure since Saturday. Has been treating fevers with Tylenol or Advil. States son was sick since last Wednesday with similar symptoms. He was seen on Monday and being treated for strep, but mom states no strep testing or viral testing was done.       History/Other:   Review of Systems   Constitutional:  Positive for fever.   HENT:  Positive for congestion.         Ear congestion and popping in left ear   Eyes: Negative.    Respiratory:  Positive for cough.    Cardiovascular: Negative.    Gastrointestinal: Negative.    Endocrine: Negative.    Genitourinary: Negative.    Musculoskeletal:  Positive for arthralgias and myalgias.   Skin: Negative.    Allergic/Immunologic: Negative.    Neurological: Negative.    Hematological: Negative.    Psychiatric/Behavioral: Negative.     All other systems reviewed and are negative.    Current Outpatient Medications   Medication Sig Dispense Refill    Doxylamine Succinate, Sleep, (UNISOM OR)       amphetamine-dextroamphetamine ER 5 MG Oral Capsule SR 24 Hr       Tirzepatide-Weight Management (ZEPBOUND) 12.5 MG/0.5ML Subcutaneous Solution Auto-injector Inject 12.5 mg into the skin once a week. 6 mL 1    mupirocin 2 % External Ointment       azelastine 0.1 % Nasal Solution 2 sprays by Nasal route 2 (two) times daily.      fluticasone propionate 50 MCG/ACT Nasal Suspension 2 sprays by Nasal route daily.      albuterol 108 (90 Base) MCG/ACT Inhalation Aero Soln As Directed.      SUMAtriptan Succinate (IMITREX) 100 MG Oral Tab 1 po once.  Repeat once after 2 hours if needed.  Max 2 per 24 hours. 9 tablet 1    diazePAM 2 MG Oral Tab as needed.      BIOTIN OR Take by mouth.      Omega-3 Fatty Acids (OMEGA-3 FISH OIL OR) Take by mouth.      Vitamin D3 1000 units Oral Tab Take 1 tablet (1,000 Units total) by mouth daily.        Allergies:Allergies[1]    Objective:   Physical Exam  Vitals reviewed.   Constitutional:       General: She is not in acute distress.     Appearance: Normal appearance. She is not ill-appearing.   HENT:      Head: Normocephalic and atraumatic.      Right Ear: Ear canal and external ear normal. A middle ear effusion is present. Tympanic membrane is not erythematous, retracted or bulging.      Left Ear: Ear canal and external ear normal. A middle ear effusion is present. Tympanic membrane is not erythematous, retracted or bulging.      Ears:      Comments: Clear fluid bilaterally     Nose: Nose normal.      Mouth/Throat:      Mouth: Mucous membranes are moist.      Pharynx: Oropharynx is clear. Uvula midline. Postnasal drip present. No oropharyngeal exudate, posterior oropharyngeal erythema or uvula swelling.      Tonsils: No tonsillar exudate or tonsillar abscesses. 0 on the right. 0 on the left.   Eyes:      Pupils: Pupils are equal, round, and reactive to light.   Cardiovascular:      Rate and Rhythm: Normal rate and regular rhythm.      Pulses: Normal pulses.      Heart sounds: Normal heart sounds.   Pulmonary:      Effort: Pulmonary effort is normal. No respiratory distress.      Breath sounds: Normal breath sounds. No wheezing.   Musculoskeletal:         General: Normal range of motion.      Cervical back: Normal range of motion and neck supple.   Lymphadenopathy:      Cervical: No cervical adenopathy.   Skin:     General: Skin is warm and dry.      Capillary Refill: Capillary refill takes less than 2 seconds.   Neurological:      General: No focal deficit present.      Mental Status: She is alert and oriented to person, place, and time. Mental status is at baseline.   Psychiatric:         Mood and Affect: Mood normal.         Behavior: Behavior normal.         Thought Content: Thought content normal.       Assessment & Plan:   1. Fever, unspecified fever cause    2. Patient requested diagnostic testing       Rapid strep test negative. Viral testing declined at this time. Discussed supportive care at home, including good PO hydration, flonase a night, continued advil/tylenol as needed for fevers/pain. PCP follow up as needed.    Orders Placed This Encounter   Procedures    Rapid Strep     Results for orders placed or performed in visit on 03/12/25   Rapid Strep    Collection Time: 03/12/25 10:32 AM   Result Value Ref Range    Strep Grp A Screen negative Negative    Control Line Present with a clear background (yes/no) yes Yes/No    Kit Lot # [id number] Numeric    Kit Expiration Date 12/20/2025 Date       Meds This Visit:  Requested Prescriptions      No prescriptions requested or ordered in this encounter       Imaging & Referrals:  None         [1]   Allergies  Allergen Reactions    Codeine HALLUCINATION     Auditory hallucinations    Propoxyphene NAUSEA AND VOMITING    Sulfa Antibiotics NAUSEA AND VOMITING     GI upset    Biaxin [Clarithromycin] OTHER (SEE COMMENTS)     GI upset      Erythromycin RASH     Derivatives GI upset        Lactose OTHER (SEE COMMENTS)    Other UNKNOWN

## 2025-03-21 ENCOUNTER — HOSPITAL ENCOUNTER (OUTPATIENT)
Age: 40
Discharge: HOME | End: 2025-03-21
Attending: EMERGENCY MEDICINE
Payer: COMMERCIAL

## 2025-03-21 ENCOUNTER — APPOINTMENT (OUTPATIENT)
Dept: GENERAL RADIOLOGY | Age: 40
End: 2025-03-21
Attending: EMERGENCY MEDICINE
Payer: COMMERCIAL

## 2025-03-21 VITALS
BODY MASS INDEX: 29 KG/M2 | DIASTOLIC BLOOD PRESSURE: 82 MMHG | TEMPERATURE: 98 F | SYSTOLIC BLOOD PRESSURE: 118 MMHG | WEIGHT: 152 LBS | HEART RATE: 87 BPM | RESPIRATION RATE: 18 BRPM | OXYGEN SATURATION: 97 %

## 2025-03-21 DIAGNOSIS — J32.9 SINOBRONCHITIS: Primary | ICD-10-CM

## 2025-03-21 DIAGNOSIS — J40 SINOBRONCHITIS: Primary | ICD-10-CM

## 2025-03-21 LAB
POCT INFLUENZA A: NEGATIVE
POCT INFLUENZA B: NEGATIVE
SARS-COV-2 RNA RESP QL NAA+PROBE: NOT DETECTED

## 2025-03-21 PROCEDURE — 87502 INFLUENZA DNA AMP PROBE: CPT | Performed by: EMERGENCY MEDICINE

## 2025-03-21 PROCEDURE — 99214 OFFICE O/P EST MOD 30 MIN: CPT

## 2025-03-21 PROCEDURE — 71046 X-RAY EXAM CHEST 2 VIEWS: CPT | Performed by: EMERGENCY MEDICINE

## 2025-03-21 RX ORDER — BENZONATATE 100 MG/1
100 CAPSULE ORAL 3 TIMES DAILY PRN
Qty: 30 CAPSULE | Refills: 0 | Status: SHIPPED | OUTPATIENT
Start: 2025-03-21 | End: 2025-04-20

## 2025-03-21 NOTE — ED PROVIDER NOTES
Patient Seen in: [facility]      History     Chief Complaint   Patient presents with    Cough/URI     Stated Complaint: Cold Symp    Subjective:   HPI      40 yo female with two weeks of symptoms. Did have fever which has resolved. Has persistent cough and sinus drainage. Son treated for strep. She had a negative strep test.     Objective:     Past Medical History:    Anesthesia complication    HARD TO AWAKEN    Asthma (HCC)    due bouts of flu    Bipolar I disorder, single manic episode (HCC)    Neurocardiogenic syncope    had as teen, outgrew    Polycystic disease, ovaries    PONV (postoperative nausea and vomiting)    Post-cholecystectomy syndrome              Past Surgical History:   Procedure Laterality Date          Cholecystectomy  \    Colonoscopy N/A 10/15/2015    Procedure: COLONOSCOPY;  Surgeon: Emily Gandhi MD;  Location:  ENDOSCOPY    Colonoscopy  2021    Trisha, Normal, rpt 10 yrs    Egd  2021    Torrey Rico celiac/hpylori    Hernia surgery      Other  WISDOM TEETH    Other surgical history      gallbladder sx    Tonsillectomy      Ventral hernia repair N/A 2015    Procedure: HERNIA VENTRAL REPAIR;  Surgeon: Arnaldo Jefferson MD;  Location:  MAIN OR                Social History     Socioeconomic History    Marital status:    Tobacco Use    Smoking status: Never    Smokeless tobacco: Never   Vaping Use    Vaping status: Never Used   Substance and Sexual Activity    Alcohol use: No     Alcohol/week: 0.0 standard drinks of alcohol    Drug use: No   Other Topics Concern    Caffeine Concern Yes     Comment: 12 ounces daily     Exercise Yes     Comment: active      Social Drivers of Health      [facility]              Review of Systems    Positive for stated complaint: Cold Symp  Other systems are as noted in HPI.  Constitutional and vital signs reviewed.      All other systems reviewed and negative  except as noted above.    Physical Exam     ED Triage Vitals [03/21/25 0932]   /82   Pulse 87   Resp 18   Temp 98.2 °F (36.8 °C)   Temp src Oral   SpO2 97 %   O2 Device None (Room air)       Current Vitals:   Vital Signs  BP: 118/82  Pulse: 87  Resp: 18  Temp: 98.2 °F (36.8 °C)  Temp src: Oral    Oxygen Therapy  SpO2: 97 %  O2 Device: None (Room air)        Physical Exam  Vitals and nursing note reviewed.   Constitutional:       Appearance: Normal appearance. She is well-developed.   HENT:      Head: Normocephalic and atraumatic.      Nose: Congestion present.      Mouth/Throat:      Mouth: Mucous membranes are moist.      Pharynx: Oropharynx is clear.   Cardiovascular:      Rate and Rhythm: Normal rate and regular rhythm.      Heart sounds: Normal heart sounds.   Pulmonary:      Effort: Pulmonary effort is normal. No respiratory distress.      Breath sounds: Normal breath sounds.   Skin:     General: Skin is warm and dry.      Capillary Refill: Capillary refill takes less than 2 seconds.   Neurological:      General: No focal deficit present.      Mental Status: She is alert.   Psychiatric:         Mood and Affect: Mood normal.         Behavior: Behavior normal.            ED Course     Labs Reviewed   RAPID SARS-COV-2 BY PCR - Normal   POCT FLU TEST - Normal    Narrative:     This assay is a rapid molecular in vitro test utilizing nucleic acid amplification of influenza A and B viral RNA.                   MDM           Medical Decision Making  Covid, flu, other viral bronchitis, pneumonia all in differential.   COVID and flu both negative.   CXR images reviewed by myself and are negative for pneumonia. Discharge on over the counter flonase and benzonatate as prescribed.     Disposition and Plan     Clinical Impression:  1. Sinobronchitis         Disposition:  Discharge  3/21/2025 10:55 am    Follow-up:  Ananda Head MD  [address] [place] [id number]  [phone]      As  needed          Medications Prescribed:  Discharge Medication List as of 3/21/2025 11:03 AM        START taking these medications    Details   benzonatate 100 MG Oral Cap Take 1 capsule (100 mg total) by mouth 3 (three) times daily as needed for cough., Normal, Disp-30 capsule, R-0                 Supplementary Documentation:

## 2025-03-21 NOTE — ED INITIAL ASSESSMENT (HPI)
On day 14 of symptoms- son had strep. She was negative. Had 1.5 weeks of fever, fever stopped on Wednesday. Now c/o of sinus drainage, with cough and coughing up phlegm.

## 2025-07-14 DIAGNOSIS — E28.2 PCOS (POLYCYSTIC OVARIAN SYNDROME): ICD-10-CM

## 2025-07-14 DIAGNOSIS — Z86.39 HISTORY OF OBESITY: ICD-10-CM

## 2025-07-14 DIAGNOSIS — E66.3 OVERWEIGHT (BMI 25.0-29.9): ICD-10-CM

## 2025-07-14 DIAGNOSIS — Z51.81 ENCOUNTER FOR THERAPEUTIC DRUG MONITORING: ICD-10-CM

## 2025-07-14 NOTE — TELEPHONE ENCOUNTER
Requesting zepbound 12.5  LOV: 2/24/25  RTC: 6 months  Filled: 2/24/25 #6ml with 1 refills    Future Appointments   Date Time Provider Department Center   9/22/2025 10:20 AM Blanca Sabillon APRN [id number]

## 2025-07-18 RX ORDER — TIRZEPATIDE 12.5 MG/.5ML
12.5 INJECTION, SOLUTION SUBCUTANEOUS WEEKLY
Qty: 6 ML | Refills: 0 | Status: SHIPPED | OUTPATIENT
Start: 2025-07-18

## 2025-08-14 ENCOUNTER — HOSPITAL ENCOUNTER (OUTPATIENT)
Age: 40
Discharge: HOME | End: 2025-08-14

## 2025-08-14 VITALS
OXYGEN SATURATION: 100 % | TEMPERATURE: 99 F | RESPIRATION RATE: 16 BRPM | DIASTOLIC BLOOD PRESSURE: 86 MMHG | SYSTOLIC BLOOD PRESSURE: 125 MMHG | HEART RATE: 77 BPM
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
## 2025-08-14 DIAGNOSIS — S60.511A CAT SCRATCH OF RIGHT HAND, INITIAL ENCOUNTER: Primary | ICD-10-CM

## 2025-08-14 PROCEDURE — 99213 OFFICE O/P EST LOW 20 MIN: CPT

## 2025-08-14 RX ORDER — MUPIROCIN 2 %
1 OINTMENT (GRAM) TOPICAL 2 TIMES DAILY
Qty: 15 G | Refills: 0 | Status: SHIPPED | OUTPATIENT
Start: 2025-08-14

## (undated) DEVICE — GLOVE BIOGEL M SURG SZ 71/2

## (undated) DEVICE — SUTURE PROLENE 0 CT-1

## (undated) DEVICE — SUTURE VICRYL 2-0

## (undated) DEVICE — LAPAROTOMY CDS: Brand: MEDLINE INDUSTRIES, INC.

## (undated) DEVICE — KENDALL SCD EXPRESS SLEEVES, KNEE LENGTH, MEDIUM: Brand: KENDALL SCD

## (undated) DEVICE — SOL  .9 1000ML BTL

## (undated) DEVICE — VIOLET BRAIDED (POLYGLACTIN 910), SYNTHETIC ABSORBABLE SUTURE: Brand: COATED VICRYL

## (undated) NOTE — ED AVS SNAPSHOT
Anthony Vikylaura   MRN: [id number]    Department:  [facility] Emergency Department   Date of Visit:  11/28/2017           Disclosure     Insurance plans vary and the physician(s) referred by the ER may not be covered by your plan.  Please contact tell this physician (or your personal doctor if your instructions are to return to your personal doctor) about any new or lasting problems. The primary care or specialist physician will see patients referred from the [facility] Emergency Department.  Severo Pastures

## (undated) NOTE — LETTER
[address], [facility]   Date:   1/3/2023     Name:   Neisha Kaye    YOB: 1985   MRN:   [id number]       [facility]? Morgan the areas on your body where you feel the described sensations. Use the appropriate symbol. Ivon Tam the areas of radiation. Include all affected areas. Just to complete the picture, please draw in the face. ACHE:  ^ ^ ^   NUMBNESS:  0000   PINS & NEEDLES:  = = = =                              ^ ^ ^                       0000              = = = =                                    ^ ^ ^                       0000            = = = =      BURNING:  XXXX   STABBING: ////                  XXXX                ////                         XXXX          ////     Please morgan the line below indicating your degree of pain right now  with 0 being no pain 10 being the worst pain possible.                                          0             1             2              3             4              5              6              7             8             9             10         Patient Signature:

## (undated) NOTE — Clinical Note
Thank you for referring Lorna Mayen to the [facility]. I met with her in consultation today via person. I have ordered lab, referred for a nutrition consultation with our dietician. She was started on Saxenda for medication therapy and will follow-up with me in about 8 weeks.

## (undated) NOTE — LETTER
AUTHORIZATION FOR SURGICAL OPERATION OR OTHER PROCEDURE    1. I hereby authorize Dr. Odell Talley and the [facility] staff assigned to my case to perform the following operation and/or procedure at the [facility]:    Left lumbar paraspinals, quadratus lumborum, gluteus desiree trigger point injection    2. My physician has explained the nature and purpose of the operation or other procedure, possible alternative methods of treatment, the risks involved, and the possibility of complication to me. I acknowledge that no guarantee has been made as to the result that may be obtained. 3.  I recognize that, during the course of this operation, or other procedure, unforseen conditions may necessitate additional or different procedure than those listed above. I, therefore, further authorize and request that the above named physician, his/her physician assistants or designees perform such procedures as are, in his/her professional opinion, necessary and desirable. 4.  Any tissue or organs removed in the operation or other procedure may be disposed of by and at the discretion of the [facility] staff and [facility]. 5.  I understand that in the event of a medical emergency, I will be transported by local paramedics to [facility] or other hospital emergency department. 6.  I certify that I have read and fully understand the above consent to operation and/or other procedure. 7.  I acknowledge that my physician has explained sedation/analgesia administration to me including the risks and benefits. I consent to the administration of sedation/analgesia as may be necessary or desirable in the judgement of my physician. Witness signature: ___________________________________________________ Date:  ______/______/_____                    Time:  ________ A. M.  P.M.        Patient Name: Nicki Morrow  6/27/1985  [id number]     Patient signature: ___________________________________________________            Statement of Physician  My signature below affirms that prior to the time of the procedure, I have explained to the patient and/or his/her guardian, the risks and benefits involved in the proposed treatment and any reasonable alternative to the proposed treatment. I have also explained the risks and benefits involved in the refusal of the proposed treatment and have answered the patient's questions.                         Date:  ______/______/_______  Provider                      Signature:  __________________________________________________________       Time:  ___________ ABARBARA BLUM

## (undated) NOTE — Clinical Note
I had the pleasure of seeing Simi Doherty on 7/17/2017. Please see my attached note.   Siobhan Corrales MD FACS [facility]

## (undated) NOTE — Clinical Note
Jean-Pierre Mott - I saw Nevaeh Villegas today with irregular bowels, intermittent LLQ pain, and pelvic floor tension myalgia. I've recommended pelvic floor PT and bowel mgmt. I will work to manage her sx. I appreciate the opportunity to participate in her care.  Thanks,

## (undated) NOTE — LETTER
17    Patient: Sanjiv Yu  : 1985 Visit date: 2017    Dear  Dr. Indu Haynes MD,    Thank you for referring Sanjiv Yu to my practice. Please find my assessment and plan below.           Assessment:      The patient is a 27 Maureen Chaudhry MD   CC: No Recipients